# Patient Record
Sex: FEMALE | Race: WHITE | NOT HISPANIC OR LATINO | Employment: OTHER | URBAN - METROPOLITAN AREA
[De-identification: names, ages, dates, MRNs, and addresses within clinical notes are randomized per-mention and may not be internally consistent; named-entity substitution may affect disease eponyms.]

---

## 2023-02-25 PROBLEM — G62.9 NEUROPATHY: Status: ACTIVE | Noted: 2023-02-25

## 2023-02-25 PROBLEM — I99.8 LOWER LIMB ISCHEMIA: Status: ACTIVE | Noted: 2023-02-25

## 2023-02-25 PROBLEM — R00.0 SINUS TACHYCARDIA: Status: ACTIVE | Noted: 2023-02-25

## 2023-02-25 PROBLEM — F41.8 ANXIETY WITH DEPRESSION: Status: ACTIVE | Noted: 2023-02-25

## 2023-02-25 PROBLEM — I74.3: Status: ACTIVE | Noted: 2023-02-25

## 2023-02-25 PROBLEM — D52.9 ANEMIA, FOLATE DEFICIENCY: Status: ACTIVE | Noted: 2023-02-25

## 2023-02-25 PROBLEM — M19.011 PRIMARY OSTEOARTHRITIS OF BOTH SHOULDERS: Status: ACTIVE | Noted: 2023-02-25

## 2023-02-25 PROBLEM — S43.432A SUPERIOR GLENOID LABRUM LESION OF LEFT SHOULDER: Status: ACTIVE | Noted: 2023-02-25

## 2023-02-25 PROBLEM — F51.02 ADJUSTMENT INSOMNIA: Status: ACTIVE | Noted: 2023-02-25

## 2023-02-25 PROBLEM — R29.818 NEUROGENIC CLAUDICATION: Status: ACTIVE | Noted: 2023-02-25

## 2023-02-25 PROBLEM — M75.100 ROTATOR CUFF TEAR: Status: ACTIVE | Noted: 2023-02-25

## 2023-02-25 PROBLEM — Z86.718 HISTORY OF DVT (DEEP VEIN THROMBOSIS): Status: ACTIVE | Noted: 2023-02-25

## 2023-02-25 PROBLEM — N93.9 ABNORMAL UTERINE BLEEDING (AUB): Status: ACTIVE | Noted: 2023-02-25

## 2023-02-25 PROBLEM — F11.21 OPIOID DEPENDENCE IN REMISSION (MULTI): Status: ACTIVE | Noted: 2023-02-25

## 2023-02-25 PROBLEM — R73.9 HYPERGLYCEMIA: Status: ACTIVE | Noted: 2023-02-25

## 2023-02-25 PROBLEM — G89.4 CHRONIC PAIN SYNDROME: Status: ACTIVE | Noted: 2023-02-25

## 2023-02-25 PROBLEM — G45.9 TRANSIENT ISCHEMIC ATTACK: Status: ACTIVE | Noted: 2023-02-25

## 2023-02-25 PROBLEM — D64.9 ANEMIA, NORMOCYTIC NORMOCHROMIC: Status: ACTIVE | Noted: 2023-02-25

## 2023-02-25 PROBLEM — S33.5XXA LUMBAR SPRAIN: Status: ACTIVE | Noted: 2023-02-25

## 2023-02-25 PROBLEM — I10 ESSENTIAL HYPERTENSION: Status: ACTIVE | Noted: 2023-02-25

## 2023-02-25 PROBLEM — E78.2 MIXED HYPERLIPIDEMIA: Status: ACTIVE | Noted: 2023-02-25

## 2023-02-25 PROBLEM — M19.012 PRIMARY OSTEOARTHRITIS OF BOTH SHOULDERS: Status: ACTIVE | Noted: 2023-02-25

## 2023-02-25 PROBLEM — J12.81 PNEUMONIA DUE TO SARS-ASSOCIATED CORONAVIRUS: Status: ACTIVE | Noted: 2023-02-25

## 2023-02-25 PROBLEM — R53.81 DEBILITY: Status: ACTIVE | Noted: 2023-02-25

## 2023-02-25 PROBLEM — M54.16 LUMBAR RADICULAR PAIN: Status: ACTIVE | Noted: 2023-02-25

## 2023-02-25 PROBLEM — M25.512 LEFT SHOULDER PAIN: Status: ACTIVE | Noted: 2023-02-25

## 2023-02-25 PROBLEM — N39.0 UTI (URINARY TRACT INFECTION): Status: ACTIVE | Noted: 2023-02-25

## 2023-02-25 PROBLEM — M43.10 DEGENERATIVE SPONDYLOLISTHESIS: Status: ACTIVE | Noted: 2023-02-25

## 2023-02-25 PROBLEM — S39.012A LUMBAR STRAIN: Status: ACTIVE | Noted: 2023-02-25

## 2023-02-25 PROBLEM — D68.59 HYPERCOAGULABLE STATE (MULTI): Status: ACTIVE | Noted: 2023-02-25

## 2023-02-25 PROBLEM — M54.31 SCIATICA OF RIGHT SIDE: Status: ACTIVE | Noted: 2023-02-25

## 2023-02-25 PROBLEM — E55.9 VITAMIN D DEFICIENCY: Status: ACTIVE | Noted: 2023-02-25

## 2023-02-25 PROBLEM — M54.50 LOW BACK PAIN: Status: ACTIVE | Noted: 2023-02-25

## 2023-02-25 PROBLEM — U09.9 POST COVID-19 CONDITION, UNSPECIFIED: Status: ACTIVE | Noted: 2023-02-25

## 2023-02-25 PROBLEM — E78.00 PURE HYPERCHOLESTEROLEMIA WITH TARGET LOW DENSITY LIPOPROTEIN (LDL) CHOLESTEROL LESS THAN 130 MG/DL: Status: ACTIVE | Noted: 2023-02-25

## 2023-02-25 RX ORDER — ASPIRIN 325 MG
TABLET, DELAYED RELEASE (ENTERIC COATED) ORAL
COMMUNITY
Start: 2022-07-15 | End: 2023-09-06 | Stop reason: SDUPTHER

## 2023-02-25 RX ORDER — PNV NO.95/FERROUS FUM/FOLIC AC 28MG-0.8MG
TABLET ORAL
COMMUNITY
End: 2024-01-31 | Stop reason: SDUPTHER

## 2023-02-25 RX ORDER — BUPROPION HYDROCHLORIDE 300 MG/1
1 TABLET ORAL DAILY
COMMUNITY
End: 2023-08-30 | Stop reason: SDUPTHER

## 2023-02-25 RX ORDER — ACETAMINOPHEN 500 MG
TABLET ORAL
COMMUNITY

## 2023-02-25 RX ORDER — FOLIC ACID 1 MG/1
TABLET ORAL
COMMUNITY
End: 2024-01-31 | Stop reason: SDUPTHER

## 2023-02-25 RX ORDER — OXYCODONE AND ACETAMINOPHEN 10; 325 MG/1; MG/1
TABLET ORAL
COMMUNITY
End: 2023-09-06 | Stop reason: ALTCHOICE

## 2023-02-25 RX ORDER — NAPROXEN SODIUM 220 MG/1
TABLET, FILM COATED ORAL
COMMUNITY

## 2023-02-25 RX ORDER — WARFARIN 2.5 MG/1
TABLET ORAL
COMMUNITY
End: 2023-04-11

## 2023-02-25 RX ORDER — DILTIAZEM HYDROCHLORIDE 120 MG/1
CAPSULE, EXTENDED RELEASE ORAL
COMMUNITY
End: 2023-03-30 | Stop reason: SDUPTHER

## 2023-02-25 RX ORDER — PERPHENAZINE 16 MG
TABLET ORAL
COMMUNITY

## 2023-02-25 RX ORDER — ATORVASTATIN CALCIUM 80 MG/1
1 TABLET, FILM COATED ORAL NIGHTLY
COMMUNITY
End: 2023-03-30 | Stop reason: SDUPTHER

## 2023-02-25 RX ORDER — MULTIVITAMIN
1 TABLET ORAL DAILY
COMMUNITY
End: 2024-02-20

## 2023-03-09 LAB
INR IN PPP BY COAGULATION ASSAY: 2 (ref 0.9–1.1)
PROTHROMBIN TIME (PT) IN PPP BY COAGULATION ASSAY: 22.9 SEC (ref 9.8–13.4)

## 2023-03-14 ENCOUNTER — OFFICE VISIT (OUTPATIENT)
Dept: PRIMARY CARE | Facility: CLINIC | Age: 63
End: 2023-03-14
Payer: COMMERCIAL

## 2023-03-14 VITALS
SYSTOLIC BLOOD PRESSURE: 126 MMHG | DIASTOLIC BLOOD PRESSURE: 81 MMHG | WEIGHT: 191 LBS | BODY MASS INDEX: 32.61 KG/M2 | RESPIRATION RATE: 20 BRPM | HEART RATE: 80 BPM | HEIGHT: 64 IN | OXYGEN SATURATION: 99 %

## 2023-03-14 DIAGNOSIS — E78.00 PURE HYPERCHOLESTEROLEMIA WITH TARGET LOW DENSITY LIPOPROTEIN (LDL) CHOLESTEROL LESS THAN 130 MG/DL: ICD-10-CM

## 2023-03-14 DIAGNOSIS — I74.3 ARTERIAL EMBOLISM AND THROMBOSIS OF LOWER EXTREMITY (MULTI): ICD-10-CM

## 2023-03-14 DIAGNOSIS — I10 ESSENTIAL HYPERTENSION: Primary | ICD-10-CM

## 2023-03-14 DIAGNOSIS — F41.8 ANXIETY WITH DEPRESSION: ICD-10-CM

## 2023-03-14 DIAGNOSIS — E55.9 VITAMIN D DEFICIENCY: ICD-10-CM

## 2023-03-14 DIAGNOSIS — D52.9 ANEMIA DUE TO FOLIC ACID DEFICIENCY, UNSPECIFIED DEFICIENCY TYPE: ICD-10-CM

## 2023-03-14 DIAGNOSIS — R73.9 HYPERGLYCEMIA: ICD-10-CM

## 2023-03-14 PROCEDURE — 3008F BODY MASS INDEX DOCD: CPT | Performed by: INTERNAL MEDICINE

## 2023-03-14 PROCEDURE — 99213 OFFICE O/P EST LOW 20 MIN: CPT | Performed by: INTERNAL MEDICINE

## 2023-03-14 PROCEDURE — 3079F DIAST BP 80-89 MM HG: CPT | Performed by: INTERNAL MEDICINE

## 2023-03-14 PROCEDURE — 3074F SYST BP LT 130 MM HG: CPT | Performed by: INTERNAL MEDICINE

## 2023-03-14 NOTE — PROGRESS NOTES
"Subjective   Patient ID: Angle Martins is a 62 y.o. female who presents for Follow-up.    HPI   Seemingly doing much better.  Compliant with medications, tolerating regimens.  Denies dyspepsia.  No gum or nose bleeding.  No easy bruisability.  Likewise, no palpitations, dizziness, diaphoresis, or any other symptoms of hypovolemia.  No dyan substernal chest pain.  No orthopnea, no paroxysmal nocturnal dyspnea.    Likewise, seemingly feeling more \"half-full,\" and continues to want to stay healthy, independent, and productive.  Does not wish harm to self or others.  No headache, blurred vision, diplopia.  No dysphagia.  Still, she does point out that sometimes she does get a little emotional perhaps.  Again, still, does not wish harm to self or others, and continues to want to improve quality of life.    Careful about exposure.  No coughing, no sputum production.  CONSTITUTIONALLY, no fever, no chills.  No night sweats.  No lingering anorexia or nausea.  No apparent lymphadenopathy.  No apparent weight loss.        Review of Systems  Review of systems as in history of present illness, and otherwise, reviewed separately as well, and was unremarkable/negative/noncontributory.        Objective   /81 (BP Location: Right arm, Patient Position: Sitting, BP Cuff Size: Adult)   Pulse 80   Resp 20   Ht 1.626 m (5' 4\")   Wt 86.6 kg (191 lb)   SpO2 99%   BMI 32.79 kg/m²     Physical Exam  In very good spirits.  Not in distress or diaphoresis.  Alert, oriented x3.  Amiable.  Not unkempt.  Moderately built.  Receptive.  Cheerful.  Appropriate.  Eager to stay healthy and independent and productive.  Does not wish harm to self or others.    HEAD pink palpebral conjunctivae, anicteric sclerae.  NECK supple, no apparent jugular venous distention.  CARDIOVASCULAR not in distress or diaphoresis.  No bipedal edema.  Regular rate and rhythm.  No murmurs.  LUNGS not in distress or diaphoresis.  Not using accessory muscles.  " Clear to auscultation bilaterally.  ABDOMEN soft, nontender.  BACK no costovertebral angle tenderness.  EXTREMITIES no clubbing, no cyanosis.  NEURO no facial asymmetry.  No apparent cranial nerve deficits.  Ambulating with single point walking stick.  Favoring right lower extremity.  No tremors.  PSYCH receptive, appropriate, and eager to maintain and improve quality of life.      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)    Relevant Orders    Uric Acid    Referral to Hematology    Follow Up In Primary Care       Endocrine/Metabolic    Vitamin D deficiency    Relevant Orders    Vitamin D 25-Hydroxy,Total    Follow Up In Primary Care    Anemia, folate deficiency    Relevant Orders    Vitamin B12    Folate    Ferritin    Iron and TIBC    Urinalysis with Reflex Microscopic and Culture    Follow Up In Primary Care       Other    Anxiety with depression    Relevant Orders    Referral to Psychiatry    Follow Up In Primary Care    Hyperglycemia    Relevant Orders    Hemoglobin A1C    Comprehensive Metabolic Panel    Follow Up In Primary Care    Pure hypercholesterolemia with target low density lipoprotein (LDL) cholesterol less than 130 mg/dL    Relevant Orders    Lipid Panel    Comprehensive Metabolic Panel    Follow Up In Primary Care        Patient is here for routine visit.

## 2023-03-14 NOTE — PATIENT INSTRUCTIONS
Thank you very much for coming.  I am very happy to see you.  You look great!    Thank you for taking care of yourself and each other.  I am glad to hear that you are thriving.    I do understand that you are doing very well, but indeed, I do agree that you will benefit from seeing PSYCHIATRY if they are available.  Dr. Mckenna or Dr. Carrera will be very instrumental.  Until then, you are seemingly doing very well with your current regimen.  Please call me of course if anything changes.    Also, you will benefit from seeing HEMATOLOGY, but again, in the meantime, you are seemingly tolerating your WARFARIN.  Still, it would be a good idea to get a second opinion.    Please come back in 2 months, May, for repeat FASTING laboratory examination, then see me for your COMPLETE physical examination for the year.  Again, until then, please do not hesitate to call with questions or concerns.    Please continue to take care of yourself and each other, and please continue to pray for our recovery from this pandemic.  I hope you have a blessed Lent and a Happy Easter!            0  Return in 2 months.  40 minutes please.  Repeat FASTING laboratory examination, then see me for COMPLETE physical examination for the year.  Coordinate with cardiology, vascular medicine/surgery, possibly psychiatry, possibly hematology.  Reassess mood, energy, function, cardiovascular risk, preventive strategies, possibly weight management.            0

## 2023-03-20 LAB
AMPHETAMINES,URINE: <50 NG/ML
MDA,URINE: <200 NG/ML
MDEA,URINE: <200 NG/ML
MDMA,UR: <200 NG/ML
METHAMPHETAMINE QUANTITATIVE URINE: <200 NG/ML
PHENTERMINE,UR: <200 NG/ML

## 2023-03-21 LAB
6-ACETYLMORPHINE: <25 NG/ML
7-AMINOCLONAZEPAM: <25 NG/ML
ALPHA-HYDROXYALPRAZOLAM: <25 NG/ML
ALPHA-HYDROXYMIDAZOLAM: <25 NG/ML
ALPRAZOLAM: <25 NG/ML
AMPHETAMINE (PRESENCE) IN URINE BY SCREEN METHOD: ABNORMAL
BARBITURATES PRESENCE IN URINE BY SCREEN METHOD: ABNORMAL
CANNABINOIDS IN URINE BY SCREEN METHOD: ABNORMAL
CHLORDIAZEPOXIDE: <25 NG/ML
CLONAZEPAM: <25 NG/ML
COCAINE (PRESENCE) IN URINE BY SCREEN METHOD: ABNORMAL
CODEINE: <50 NG/ML
CREATINE, URINE FOR DRUG: 74.6 MG/DL
DIAZEPAM: <25 NG/ML
DRUG SCREEN COMMENT URINE: ABNORMAL
EDDP: <25 NG/ML
FENTANYL CONFIRMATION, URINE: <2.5 NG/ML
HYDROCODONE: <25 NG/ML
HYDROMORPHONE: <25 NG/ML
LORAZEPAM: <25 NG/ML
METHADONE CONFIRMATION,URINE: <25 NG/ML
MIDAZOLAM: <25 NG/ML
MORPHINE URINE: <50 NG/ML
NORDIAZEPAM: <25 NG/ML
NORFENTANYL: <2.5 NG/ML
NORHYDROCODONE: <25 NG/ML
NOROXYCODONE: >1000 NG/ML
O-DESMETHYLTRAMADOL: <50 NG/ML
OXAZEPAM: <25 NG/ML
OXYCODONE: 1730 NG/ML
OXYMORPHONE: 561 NG/ML
PHENCYCLIDINE (PRESENCE) IN URINE BY SCREEN METHOD: ABNORMAL
TEMAZEPAM: <25 NG/ML
TRAMADOL: <50 NG/ML
ZOLPIDEM METABOLITE (ZCA): <25 NG/ML
ZOLPIDEM: <25 NG/ML

## 2023-03-30 DIAGNOSIS — I10 ESSENTIAL HYPERTENSION: Primary | ICD-10-CM

## 2023-03-30 DIAGNOSIS — E78.00 PURE HYPERCHOLESTEROLEMIA WITH TARGET LOW DENSITY LIPOPROTEIN (LDL) CHOLESTEROL LESS THAN 130 MG/DL: ICD-10-CM

## 2023-03-31 RX ORDER — ATORVASTATIN CALCIUM 80 MG/1
80 TABLET, FILM COATED ORAL NIGHTLY
Qty: 90 TABLET | Refills: 1 | Status: SHIPPED | OUTPATIENT
Start: 2023-03-31 | End: 2023-06-29 | Stop reason: SDUPTHER

## 2023-03-31 RX ORDER — DILTIAZEM HYDROCHLORIDE 120 MG/1
120 CAPSULE, EXTENDED RELEASE ORAL DAILY
Qty: 90 CAPSULE | Refills: 1 | Status: SHIPPED | OUTPATIENT
Start: 2023-03-31 | End: 2023-08-30 | Stop reason: SDUPTHER

## 2023-04-03 ENCOUNTER — TELEPHONE (OUTPATIENT)
Dept: PRIMARY CARE | Facility: CLINIC | Age: 63
End: 2023-04-03
Payer: COMMERCIAL

## 2023-04-05 DIAGNOSIS — D68.59 HYPERCOAGULABLE STATE (MULTI): ICD-10-CM

## 2023-04-05 DIAGNOSIS — I74.3 ARTERIAL EMBOLISM AND THROMBOSIS OF LOWER EXTREMITY (MULTI): Primary | ICD-10-CM

## 2023-04-05 DIAGNOSIS — Z86.718 HISTORY OF DVT (DEEP VEIN THROMBOSIS): ICD-10-CM

## 2023-04-05 LAB
INR IN PPP BY COAGULATION ASSAY: 1.7 (ref 0.9–1.1)
PROTHROMBIN TIME (PT) IN PPP BY COAGULATION ASSAY: 19.3 SEC (ref 9.8–13.4)

## 2023-04-11 ENCOUNTER — ANTICOAGULATION - OTHER VISIT (DOAC) (OUTPATIENT)
Dept: PRIMARY CARE | Facility: CLINIC | Age: 63
End: 2023-04-11
Payer: COMMERCIAL

## 2023-04-11 DIAGNOSIS — Z86.718 HISTORY OF DVT (DEEP VEIN THROMBOSIS): ICD-10-CM

## 2023-04-11 DIAGNOSIS — I74.3 ARTERIAL EMBOLISM AND THROMBOSIS OF LOWER EXTREMITY (MULTI): Primary | ICD-10-CM

## 2023-04-11 RX ORDER — WARFARIN 3 MG/1
TABLET ORAL
Qty: 90 TABLET | Refills: 0 | Status: SHIPPED | OUTPATIENT
Start: 2023-04-11 | End: 2023-06-27 | Stop reason: SDUPTHER

## 2023-04-11 NOTE — PROGRESS NOTES
PT/INR 1.7, with patient called to make adjustments.    Taking 2.5 mg 5 days a week, 3.75 mg 2 other days.    Instructed patient to take 2 tablets 2.5 mg now, then proceed to take 3 mg every day until seen by WARFARIN CLINIC.    Referral placed.  Patient already in touch with the warfarin clinic in [place].    Patient urged to call with further questions or concerns.  She also has my personal number.

## 2023-04-12 DIAGNOSIS — D52.9 ANEMIA DUE TO FOLIC ACID DEFICIENCY, UNSPECIFIED DEFICIENCY TYPE: ICD-10-CM

## 2023-04-12 DIAGNOSIS — Z86.718 HISTORY OF DVT (DEEP VEIN THROMBOSIS): ICD-10-CM

## 2023-04-12 DIAGNOSIS — N93.9 ABNORMAL UTERINE BLEEDING (AUB): Primary | ICD-10-CM

## 2023-04-12 DIAGNOSIS — I74.3 ARTERIAL EMBOLISM AND THROMBOSIS OF LOWER EXTREMITY (MULTI): ICD-10-CM

## 2023-05-03 ENCOUNTER — TELEPHONE (OUTPATIENT)
Dept: PRIMARY CARE | Facility: CLINIC | Age: 63
End: 2023-05-03
Payer: COMMERCIAL

## 2023-05-03 DIAGNOSIS — M54.16 LUMBAR RADICULAR PAIN: Primary | ICD-10-CM

## 2023-05-03 DIAGNOSIS — G62.9 NEUROPATHY: ICD-10-CM

## 2023-05-03 DIAGNOSIS — R29.818 NEUROGENIC CLAUDICATION: ICD-10-CM

## 2023-05-03 DIAGNOSIS — M43.10 DEGENERATIVE SPONDYLOLISTHESIS: ICD-10-CM

## 2023-05-03 NOTE — TELEPHONE ENCOUNTER
Needs new EMG order, pt is scheduled for tomorrow. Says they would hate to have to reschedule pt.  Please place order in system.  When done we will fax.    [phone]  fx [phone]

## 2023-05-16 ENCOUNTER — APPOINTMENT (OUTPATIENT)
Dept: PRIMARY CARE | Facility: CLINIC | Age: 63
End: 2023-05-16
Payer: COMMERCIAL

## 2023-05-31 ENCOUNTER — APPOINTMENT (OUTPATIENT)
Dept: PRIMARY CARE | Facility: CLINIC | Age: 63
End: 2023-05-31
Payer: COMMERCIAL

## 2023-06-12 ENCOUNTER — OFFICE VISIT (OUTPATIENT)
Dept: PRIMARY CARE | Facility: CLINIC | Age: 63
End: 2023-06-12
Payer: COMMERCIAL

## 2023-06-12 VITALS
SYSTOLIC BLOOD PRESSURE: 120 MMHG | DIASTOLIC BLOOD PRESSURE: 85 MMHG | RESPIRATION RATE: 20 BRPM | HEART RATE: 73 BPM | BODY MASS INDEX: 33.05 KG/M2 | WEIGHT: 193.6 LBS | HEIGHT: 64 IN | OXYGEN SATURATION: 96 %

## 2023-06-12 DIAGNOSIS — I74.3 ARTERIAL EMBOLISM AND THROMBOSIS OF LOWER EXTREMITY (MULTI): ICD-10-CM

## 2023-06-12 DIAGNOSIS — E78.00 PURE HYPERCHOLESTEROLEMIA WITH TARGET LOW DENSITY LIPOPROTEIN (LDL) CHOLESTEROL LESS THAN 130 MG/DL: ICD-10-CM

## 2023-06-12 DIAGNOSIS — E55.9 VITAMIN D DEFICIENCY: ICD-10-CM

## 2023-06-12 DIAGNOSIS — I10 ESSENTIAL HYPERTENSION: Primary | ICD-10-CM

## 2023-06-12 DIAGNOSIS — N93.9 ABNORMAL UTERINE BLEEDING (AUB): ICD-10-CM

## 2023-06-12 DIAGNOSIS — E66.09 CLASS 1 OBESITY DUE TO EXCESS CALORIES WITH SERIOUS COMORBIDITY AND BODY MASS INDEX (BMI) OF 33.0 TO 33.9 IN ADULT: ICD-10-CM

## 2023-06-12 DIAGNOSIS — R73.9 HYPERGLYCEMIA: ICD-10-CM

## 2023-06-12 DIAGNOSIS — F41.8 ANXIETY WITH DEPRESSION: ICD-10-CM

## 2023-06-12 DIAGNOSIS — Z12.31 SCREENING MAMMOGRAM, ENCOUNTER FOR: ICD-10-CM

## 2023-06-12 DIAGNOSIS — Z13.820 SCREENING FOR OSTEOPOROSIS: ICD-10-CM

## 2023-06-12 DIAGNOSIS — R26.0 ATAXIC GAIT: ICD-10-CM

## 2023-06-12 DIAGNOSIS — Z11.59 ENCOUNTER FOR HEPATITIS C SCREENING TEST FOR LOW RISK PATIENT: ICD-10-CM

## 2023-06-12 DIAGNOSIS — R29.818 ROMBERG'S TEST POSITIVE: ICD-10-CM

## 2023-06-12 DIAGNOSIS — D52.9 ANEMIA DUE TO FOLIC ACID DEFICIENCY, UNSPECIFIED DEFICIENCY TYPE: ICD-10-CM

## 2023-06-12 DIAGNOSIS — M85.89 OSTEOPENIA OF MULTIPLE SITES: ICD-10-CM

## 2023-06-12 PROCEDURE — 1036F TOBACCO NON-USER: CPT | Performed by: INTERNAL MEDICINE

## 2023-06-12 PROCEDURE — 99215 OFFICE O/P EST HI 40 MIN: CPT | Performed by: INTERNAL MEDICINE

## 2023-06-12 PROCEDURE — 3079F DIAST BP 80-89 MM HG: CPT | Performed by: INTERNAL MEDICINE

## 2023-06-12 PROCEDURE — 3074F SYST BP LT 130 MM HG: CPT | Performed by: INTERNAL MEDICINE

## 2023-06-12 PROCEDURE — 3008F BODY MASS INDEX DOCD: CPT | Performed by: INTERNAL MEDICINE

## 2023-06-12 RX ORDER — GABAPENTIN 300 MG/1
CAPSULE ORAL
COMMUNITY
Start: 2022-06-14 | End: 2023-09-06 | Stop reason: ALTCHOICE

## 2023-06-12 RX ORDER — ACETAMINOPHEN 325 MG/1
TABLET ORAL EVERY 6 HOURS PRN
COMMUNITY
Start: 2022-12-22

## 2023-06-12 NOTE — PATIENT INSTRUCTIONS
Thank you very much for coming.  I am very happy to see you.    I am so happy to hear that you continue to have improvement of your overall health and quality of life.  Please continue following with your VASCULAR specialist, as well as your CARDIOLOGIST.    It is very important and very helpful for you to have FASTING laboratory examinations done soon.  They are on file for you at [facility] to retrieve, ordered in March.  Also, tell them that I have also ordered a hepatitis C screening test.  Please do not forget to drink lots of water, or black coffee, black tea, but no sugar, no cream, and no milk.    I am glad to hear that you have had some improvement as far as pain control is concerned, and that you are able to move better.  Still, I think that it is also a good idea for you to apply for DISABILITY.  I will try my best to fill out the paperwork if it comes across my desk.  You do need help and the disability services are for patients like you.    Thank you for seeing GYNECOLOGY.  You are due for your MAMMOGRAM soon.    Likewise, you are due for a BONE DENSITY test.    Again, thank you very much for coming.  I would like to see you again in 2 months.  Until then, I do understand that you already started OZEMPIC, and you have not had any trouble.  Let see what your blood examinations show.  If you run out of Ozempic before your insurance allows us to refill it, please let me know, and I can obtain some samples for you.    Please go ahead and get yourself vaccinated for SHINGLES.  SHINGRIX is recommended by your insurance, and should be paid for by your insurance.  Call your favorite pharmacy to schedule!    Please continue to take care of yourself and each other, and please continue to pray for our recovery from this pandemic.  FASTING laboratory examinations soon via [facility].  Let me call you with results and possible changes.    See you in 2 months.  Until then, belated happy Mother's Day.   Advanced happy Father's Day to your .  I hope you have a good summer.  Call please as needed.            0  Return in 2 months.  20 minutes please.  Reassess debility.  Coordinate with VASCULAR surgery, cardiology, hematology, neurology, chronic pain, physical therapy.  Consider referral to psychiatry if appropriate.  Review preventive strategies, cardiovascular risk.            0

## 2023-06-12 NOTE — PROGRESS NOTES
Subjective   Patient ID: Angle Martins is a 63 y.o. female who presents for Annual Exam.    HPI   The patient is here for her COMPLETE physical examination for the year.  She has no particular complaints.    Compliant with medications, tolerating regimens.  Working with VASCULAR surgery, CHRONIC pain, CARDIOLOGY, and in the meantime, managing with the use of a single-point walking stick, thanks to PHYSICAL THERAPY.  Remaining motivated to continue to improve quality of life.  No headache, blurred vision, diplopia.  No dysphagia.  No new focal weakness.  No recent falls.  Not worried about memory.  Not wishing harm to self or others.    Careful about exposure.  No coughing, no sputum production.  CONSTITUTIONALLY, no fever, no chills.  No night sweats.  No lingering anorexia or nausea.  No apparent lymphadenopathy.  No apparent weight loss.  Appetite preserved, with no nausea, vomiting, abdominal distress.  No diarrhea, no constipation.  No apparent blood in stool.  No apparent weight loss.  No dysuria, flank, suprapubic pain.  No discharge, no pruritus.  No rash.  No malodor.  No hesitancy, no feeling of incomplete voiding.  No skin changes, rashes, pruritus, jaundice.  No easy bruisability.  ENDOCRINE with no polyuria, polydipsia, polyphagia.  No blurred vision.  No skin, hair, nail changes.  No dramatic weight loss or weight gain.      Following closely with CHRONIC PAIN, with good response to infusion of KETAMINE.  Again, patient very happy about response, with improved exercise tolerance, improved mood, and in the meantime, no episodes of confusion or delirium.    Seen by GYNECOLOGY for abnormal uterine bleeding in September of last year, and was subjected to internal examination, and was told that everything was okay.  In the interim, tolerating medications, some bruising, but otherwise, no gum or nose bleeding.  No apparent blood in urine or stool.  Likewise, no recurrence of vaginal spotting.    The patient  "later pointed out that she did restart her OZEMPIC.  She was successful in losing about 20 pounds over the past 2 years, and was wondering if she could restart taking SEMAGLUTIDE again.  She already started with 0.25 mg once a week.  Again, no GI symptoms at this time.    Likewise, later, the patient pointed out that she does get teary-eyed and perhaps a little sentimental when she thinks about how sick she has been, but then she realizes that she could have lost her right leg, that she has been very fortunate to have such advanced care available to her, and she states that she does not stay depressive for too long.  Again, continues to want to improve quality of life, and does not wish harm to self or others.        Review of Systems  Review of systems as in history of present illness, and otherwise, reviewed separately as well, and was unremarkable/negative/noncontributory.          Objective   /85 (BP Location: Right arm, Patient Position: Sitting, BP Cuff Size: Adult)   Pulse 73   Resp 20   Ht 1.626 m (5' 4\")   Wt 87.8 kg (193 lb 9.6 oz)   SpO2 96%   BMI 33.23 kg/m²     Physical Exam  In very good spirits.  Not in distress or diaphoresis.  Alert, oriented x3.  Amiable.  Not unkempt.  Receptive.  Cheerful.  Appropriate.  Eager to continue to improve quality of life.  Does not wish harm to self or others.    HEAD pink palpebral conjunctivae, anicteric sclerae.  Mucous membranes moist.  No tonsillopharyngeal congestion, no thrush.  Tympanic membranes intact bilaterally.  NECK supple, no apparent jugular venous distention.  No apparent lymphadenopathy.  No carotid bruit.  CARDIOVASCULAR not in distress or diaphoresis.  No bipedal edema.  Regular rate and rhythm.  No heaves, thrills, or murmurs appreciated.  LUNGS not in distress or diaphoresis.  Not using accessory muscles.  Clear to auscultation bilaterally.  ABDOMEN soft, nontender.  Liver and spleen not palpable.  BACK no costovertebral angle " tenderness.  EXTREMITIES no clubbing, no cyanosis.  Currently, pulses +2 throughout.  No calf tenderness bilaterally.  Onychomycosis noted left great toenail, absent right great toenail.  SCARS noted RLE.  Intact skin.  SKIN with no breakdown, bleeding, jaundice.  NEURO no facial asymmetry.  No apparent cranial nerve deficits.  Romberg positive.  Ambulating with use of single-point walking stick.  No apparent focal weakness.  No tremors.  PSYCH receptive, appropriate, and eager to maintain and improve quality of life.          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        Relevant Orders    XR DEXA bone density    Class 1 obesity due to excess calories with serious comorbidity and body mass index (BMI) of 33.0 to 33.9 in adult        Encounter for hepatitis C screening test for low risk patient        Relevant Orders    Hepatitis C antibody    Screening mammogram, encounter for        Relevant Orders    BI mammo bilateral screening tomosynthesis    Ataxic gait        Relevant Orders    XR DEXA bone density    Osteopenia of multiple sites        Relevant Orders    XR DEXA bone density    Screening for osteoporosis        Relevant Orders    XR DEXA bone density             Thank you very much for coming.  I am very happy to see you.    I am so happy to hear that you continue to have improvement of your overall health and quality of life.  Please continue following with your VASCULAR specialist, as well as your CARDIOLOGIST.    It is very important and very helpful for  you to have FASTING laboratory examinations done soon.  They are on file for you at [facility] to retrieve, ordered in March.  Also, tell them that I have also ordered a hepatitis C screening test.  Please do not forget to drink lots of water, or black coffee, black tea, but no sugar, no cream, and no milk.    I am glad to hear that you have had some improvement as far as pain control is concerned, and that you are able to move better.  Still, I think that it is also a good idea for you to apply for DISABILITY.  I will try my best to fill out the paperwork if it comes across my desk.  You do need help and the disability services are for patients like you.    Thank you for seeing GYNECOLOGY.  You are due for your MAMMOGRAM soon.    Likewise, you are due for a BONE DENSITY test.    Again, thank you very much for coming.  I would like to see you again in 2 months.  Until then, I do understand that you already started OZEMPIC, and you have not had any trouble.  Let see what your blood examinations show.  If you run out of Ozempic before your insurance allows us to refill it, please let me know, and I can obtain some samples for you.    Please go ahead and get yourself vaccinated for SHINGLES.  SHINGRIX is recommended by your insurance, and should be paid for by your insurance.  Call your favorite pharmacy to schedule!    Please continue to take care of yourself and each other, and please continue to pray for our recovery from this pandemic.  FASTING laboratory examinations soon via [facility].  Let me call you with results and possible changes.    See you in 2 months.  Until then, belated happy Mother's Day.  Advanced happy Father's Day to your .  I hope you have a good summer.  Call please as needed.            0  Return in 2 months.  20 minutes please.  Reassess debility.  Coordinate with VASCULAR surgery, cardiology, hematology, neurology, chronic pain, physical therapy.  Consider referral to  psychiatry if appropriate.  Review preventive strategies, cardiovascular risk.            0

## 2023-06-22 ENCOUNTER — LAB (OUTPATIENT)
Dept: LAB | Facility: LAB | Age: 63
End: 2023-06-22
Payer: COMMERCIAL

## 2023-06-22 DIAGNOSIS — N93.9 ABNORMAL UTERINE BLEEDING (AUB): ICD-10-CM

## 2023-06-22 DIAGNOSIS — I74.3 ARTERIAL EMBOLISM AND THROMBOSIS OF LOWER EXTREMITY (MULTI): ICD-10-CM

## 2023-06-22 DIAGNOSIS — D52.9 ANEMIA DUE TO FOLIC ACID DEFICIENCY, UNSPECIFIED DEFICIENCY TYPE: ICD-10-CM

## 2023-06-22 DIAGNOSIS — Z86.718 HISTORY OF DVT (DEEP VEIN THROMBOSIS): ICD-10-CM

## 2023-06-22 DIAGNOSIS — R73.9 HYPERGLYCEMIA: ICD-10-CM

## 2023-06-22 DIAGNOSIS — Z11.59 ENCOUNTER FOR HEPATITIS C SCREENING TEST FOR LOW RISK PATIENT: ICD-10-CM

## 2023-06-22 DIAGNOSIS — E78.00 PURE HYPERCHOLESTEROLEMIA WITH TARGET LOW DENSITY LIPOPROTEIN (LDL) CHOLESTEROL LESS THAN 130 MG/DL: ICD-10-CM

## 2023-06-22 DIAGNOSIS — I10 ESSENTIAL HYPERTENSION: ICD-10-CM

## 2023-06-22 DIAGNOSIS — E55.9 VITAMIN D DEFICIENCY: ICD-10-CM

## 2023-06-22 LAB
ALANINE AMINOTRANSFERASE (SGPT) (U/L) IN SER/PLAS: 18 U/L (ref 7–45)
ALBUMIN (G/DL) IN SER/PLAS: 4.6 G/DL (ref 3.4–5)
ALKALINE PHOSPHATASE (U/L) IN SER/PLAS: 70 U/L (ref 33–136)
ANION GAP IN SER/PLAS: 13 MMOL/L (ref 10–20)
APPEARANCE, URINE: CLEAR
ASPARTATE AMINOTRANSFERASE (SGOT) (U/L) IN SER/PLAS: 16 U/L (ref 9–39)
BASOPHILS (10*3/UL) IN BLOOD BY AUTOMATED COUNT: 0.03 X10E9/L (ref 0–0.1)
BASOPHILS/100 LEUKOCYTES IN BLOOD BY AUTOMATED COUNT: 0.6 % (ref 0–2)
BILIRUBIN TOTAL (MG/DL) IN SER/PLAS: 0.4 MG/DL (ref 0–1.2)
BILIRUBIN, URINE: NEGATIVE
BLOOD, URINE: ABNORMAL
CALCIDIOL (25 OH VITAMIN D3) (NG/ML) IN SER/PLAS: 30 NG/ML
CALCIUM (MG/DL) IN SER/PLAS: 9.9 MG/DL (ref 8.6–10.3)
CARBON DIOXIDE, TOTAL (MMOL/L) IN SER/PLAS: 27 MMOL/L (ref 21–32)
CHLORIDE (MMOL/L) IN SER/PLAS: 105 MMOL/L (ref 98–107)
CHOLESTEROL (MG/DL) IN SER/PLAS: 167 MG/DL (ref 0–199)
CHOLESTEROL IN HDL (MG/DL) IN SER/PLAS: 59.7 MG/DL
CHOLESTEROL/HDL RATIO: 2.8
COBALAMIN (VITAMIN B12) (PG/ML) IN SER/PLAS: 305 PG/ML (ref 211–911)
COLOR, URINE: YELLOW
CREATINE KINASE (U/L) IN SER/PLAS: 48 U/L (ref 0–215)
CREATININE (MG/DL) IN SER/PLAS: 1.06 MG/DL (ref 0.5–1.05)
EOSINOPHILS (10*3/UL) IN BLOOD BY AUTOMATED COUNT: 0.13 X10E9/L (ref 0–0.7)
EOSINOPHILS/100 LEUKOCYTES IN BLOOD BY AUTOMATED COUNT: 2.4 % (ref 0–6)
ERYTHROCYTE DISTRIBUTION WIDTH (RATIO) BY AUTOMATED COUNT: 13.1 % (ref 11.5–14.5)
ERYTHROCYTE MEAN CORPUSCULAR HEMOGLOBIN CONCENTRATION (G/DL) BY AUTOMATED: 33.6 G/DL (ref 32–36)
ERYTHROCYTE MEAN CORPUSCULAR VOLUME (FL) BY AUTOMATED COUNT: 95 FL (ref 80–100)
ERYTHROCYTES (10*6/UL) IN BLOOD BY AUTOMATED COUNT: 4.45 X10E12/L (ref 4–5.2)
FERRITIN (UG/LL) IN SER/PLAS: 127 UG/L (ref 8–150)
FOLATE (NG/ML) IN SER/PLAS: >22.3 NG/ML
GFR FEMALE: 60 ML/MIN/1.73M2
GLUCOSE (MG/DL) IN SER/PLAS: 90 MG/DL (ref 74–99)
GLUCOSE, URINE: NEGATIVE MG/DL
HEMATOCRIT (%) IN BLOOD BY AUTOMATED COUNT: 42.3 % (ref 36–46)
HEMOGLOBIN (G/DL) IN BLOOD: 14.2 G/DL (ref 12–16)
HEPATITIS C VIRUS AB PRESENCE IN SERUM: NONREACTIVE
IMMATURE GRANULOCYTES/100 LEUKOCYTES IN BLOOD BY AUTOMATED COUNT: 0.2 % (ref 0–0.9)
IRON (UG/DL) IN SER/PLAS: 86 UG/DL (ref 35–150)
IRON BINDING CAPACITY (UG/DL) IN SER/PLAS: 330 UG/DL (ref 240–445)
IRON SATURATION (%) IN SER/PLAS: 26 % (ref 25–45)
KETONES, URINE: ABNORMAL MG/DL
LDL: 88 MG/DL (ref 0–99)
LEUKOCYTE ESTERASE, URINE: NEGATIVE
LEUKOCYTES (10*3/UL) IN BLOOD BY AUTOMATED COUNT: 5.4 X10E9/L (ref 4.4–11.3)
LYMPHOCYTES (10*3/UL) IN BLOOD BY AUTOMATED COUNT: 1.8 X10E9/L (ref 1.2–4.8)
LYMPHOCYTES/100 LEUKOCYTES IN BLOOD BY AUTOMATED COUNT: 33.6 % (ref 13–44)
MAGNESIUM (MG/DL) IN SER/PLAS: 1.84 MG/DL (ref 1.6–2.4)
MONOCYTES (10*3/UL) IN BLOOD BY AUTOMATED COUNT: 0.44 X10E9/L (ref 0.1–1)
MONOCYTES/100 LEUKOCYTES IN BLOOD BY AUTOMATED COUNT: 8.2 % (ref 2–10)
MUCUS, URINE: NORMAL /LPF
NEUTROPHILS (10*3/UL) IN BLOOD BY AUTOMATED COUNT: 2.94 X10E9/L (ref 1.2–7.7)
NEUTROPHILS/100 LEUKOCYTES IN BLOOD BY AUTOMATED COUNT: 55 % (ref 40–80)
NITRITE, URINE: NEGATIVE
PH, URINE: 6 (ref 5–8)
PLATELETS (10*3/UL) IN BLOOD AUTOMATED COUNT: 212 X10E9/L (ref 150–450)
POTASSIUM (MMOL/L) IN SER/PLAS: 4.2 MMOL/L (ref 3.5–5.3)
PROTEIN TOTAL: 6.9 G/DL (ref 6.4–8.2)
PROTEIN, URINE: ABNORMAL MG/DL
RBC, URINE: 2 /HPF (ref 0–5)
SODIUM (MMOL/L) IN SER/PLAS: 141 MMOL/L (ref 136–145)
SPECIFIC GRAVITY, URINE: 1.02 (ref 1–1.03)
SQUAMOUS EPITHELIAL CELLS, URINE: <1 /HPF
THYROTROPIN (MIU/L) IN SER/PLAS BY DETECTION LIMIT <= 0.05 MIU/L: 1.75 MIU/L (ref 0.44–3.98)
TRIGLYCERIDE (MG/DL) IN SER/PLAS: 97 MG/DL (ref 0–149)
URATE (MG/DL) IN SER/PLAS: 4.5 MG/DL (ref 2.3–6.7)
UREA NITROGEN (MG/DL) IN SER/PLAS: 17 MG/DL (ref 6–23)
UROBILINOGEN, URINE: <2 MG/DL (ref 0–1.9)
VLDL: 19 MG/DL (ref 0–40)
WBC, URINE: 1 /HPF (ref 0–5)

## 2023-06-22 PROCEDURE — 82550 ASSAY OF CK (CPK): CPT

## 2023-06-22 PROCEDURE — 82306 VITAMIN D 25 HYDROXY: CPT

## 2023-06-22 PROCEDURE — 84443 ASSAY THYROID STIM HORMONE: CPT

## 2023-06-22 PROCEDURE — 82728 ASSAY OF FERRITIN: CPT

## 2023-06-22 PROCEDURE — 85025 COMPLETE CBC W/AUTO DIFF WBC: CPT

## 2023-06-22 PROCEDURE — 80061 LIPID PANEL: CPT

## 2023-06-22 PROCEDURE — 36415 COLL VENOUS BLD VENIPUNCTURE: CPT

## 2023-06-22 PROCEDURE — 84550 ASSAY OF BLOOD/URIC ACID: CPT

## 2023-06-22 PROCEDURE — 82746 ASSAY OF FOLIC ACID SERUM: CPT

## 2023-06-22 PROCEDURE — 83550 IRON BINDING TEST: CPT

## 2023-06-22 PROCEDURE — 83036 HEMOGLOBIN GLYCOSYLATED A1C: CPT

## 2023-06-22 PROCEDURE — 83540 ASSAY OF IRON: CPT

## 2023-06-22 PROCEDURE — 82607 VITAMIN B-12: CPT

## 2023-06-22 PROCEDURE — 83735 ASSAY OF MAGNESIUM: CPT

## 2023-06-22 PROCEDURE — 86803 HEPATITIS C AB TEST: CPT

## 2023-06-22 PROCEDURE — 80053 COMPREHEN METABOLIC PANEL: CPT

## 2023-06-22 PROCEDURE — 81001 URINALYSIS AUTO W/SCOPE: CPT

## 2023-06-23 LAB
ESTIMATED AVERAGE GLUCOSE FOR HBA1C: 108 MG/DL
HEMOGLOBIN A1C/HEMOGLOBIN TOTAL IN BLOOD: 5.4 %

## 2023-06-27 DIAGNOSIS — R73.03 PREDIABETES: Primary | ICD-10-CM

## 2023-06-27 DIAGNOSIS — E66.09 CLASS 1 OBESITY DUE TO EXCESS CALORIES WITH SERIOUS COMORBIDITY AND BODY MASS INDEX (BMI) OF 33.0 TO 33.9 IN ADULT: ICD-10-CM

## 2023-06-27 DIAGNOSIS — Z86.718 HISTORY OF DVT (DEEP VEIN THROMBOSIS): ICD-10-CM

## 2023-06-27 DIAGNOSIS — I74.3 ARTERIAL EMBOLISM AND THROMBOSIS OF LOWER EXTREMITY (MULTI): ICD-10-CM

## 2023-06-27 RX ORDER — WARFARIN 3 MG/1
TABLET ORAL
Qty: 96 TABLET | Refills: 0 | Status: SHIPPED | OUTPATIENT
Start: 2023-06-27 | End: 2023-06-29 | Stop reason: SDUPTHER

## 2023-06-29 DIAGNOSIS — I74.3 ARTERIAL EMBOLISM AND THROMBOSIS OF LOWER EXTREMITY (MULTI): ICD-10-CM

## 2023-06-29 DIAGNOSIS — E78.00 PURE HYPERCHOLESTEROLEMIA WITH TARGET LOW DENSITY LIPOPROTEIN (LDL) CHOLESTEROL LESS THAN 130 MG/DL: ICD-10-CM

## 2023-06-29 DIAGNOSIS — Z86.718 HISTORY OF DVT (DEEP VEIN THROMBOSIS): ICD-10-CM

## 2023-06-29 RX ORDER — ATORVASTATIN CALCIUM 80 MG/1
80 TABLET, FILM COATED ORAL NIGHTLY
Qty: 90 TABLET | Refills: 1 | Status: SHIPPED | OUTPATIENT
Start: 2023-06-29 | End: 2024-02-05 | Stop reason: SDUPTHER

## 2023-06-29 RX ORDER — WARFARIN 3 MG/1
TABLET ORAL
Qty: 96 TABLET | Refills: 0 | Status: SHIPPED | OUTPATIENT
Start: 2023-06-29 | End: 2023-11-06 | Stop reason: SDUPTHER

## 2023-08-14 ENCOUNTER — APPOINTMENT (OUTPATIENT)
Dept: PRIMARY CARE | Facility: CLINIC | Age: 63
End: 2023-08-14
Payer: COMMERCIAL

## 2023-08-30 DIAGNOSIS — I10 ESSENTIAL HYPERTENSION: ICD-10-CM

## 2023-08-30 DIAGNOSIS — F41.8 ANXIETY WITH DEPRESSION: Primary | ICD-10-CM

## 2023-08-31 RX ORDER — DILTIAZEM HYDROCHLORIDE 120 MG/1
120 CAPSULE, EXTENDED RELEASE ORAL DAILY
Qty: 90 CAPSULE | Refills: 1 | Status: SHIPPED | OUTPATIENT
Start: 2023-08-31 | End: 2024-04-09 | Stop reason: SDUPTHER

## 2023-08-31 RX ORDER — BUPROPION HYDROCHLORIDE 300 MG/1
300 TABLET ORAL DAILY
Qty: 90 TABLET | Refills: 0 | Status: SHIPPED | OUTPATIENT
Start: 2023-08-31 | End: 2023-09-06 | Stop reason: ALTCHOICE

## 2023-09-06 ENCOUNTER — OFFICE VISIT (OUTPATIENT)
Dept: PRIMARY CARE | Facility: CLINIC | Age: 63
End: 2023-09-06
Payer: COMMERCIAL

## 2023-09-06 VITALS
HEIGHT: 64 IN | HEART RATE: 66 BPM | BODY MASS INDEX: 32.78 KG/M2 | SYSTOLIC BLOOD PRESSURE: 106 MMHG | DIASTOLIC BLOOD PRESSURE: 73 MMHG | WEIGHT: 192 LBS | OXYGEN SATURATION: 95 %

## 2023-09-06 DIAGNOSIS — E66.09 CLASS 1 OBESITY DUE TO EXCESS CALORIES WITH SERIOUS COMORBIDITY AND BODY MASS INDEX (BMI) OF 33.0 TO 33.9 IN ADULT: ICD-10-CM

## 2023-09-06 DIAGNOSIS — F33.1 MAJOR DEPRESSIVE DISORDER, RECURRENT EPISODE, MODERATE DEGREE (MULTI): ICD-10-CM

## 2023-09-06 DIAGNOSIS — R53.81 DEBILITY: Primary | ICD-10-CM

## 2023-09-06 DIAGNOSIS — E78.2 MIXED HYPERLIPIDEMIA: ICD-10-CM

## 2023-09-06 DIAGNOSIS — U07.1 COVID-19 VIRUS INFECTION: ICD-10-CM

## 2023-09-06 DIAGNOSIS — D52.9 ANEMIA DUE TO FOLIC ACID DEFICIENCY, UNSPECIFIED DEFICIENCY TYPE: ICD-10-CM

## 2023-09-06 DIAGNOSIS — F11.21 OPIOID DEPENDENCE IN REMISSION (MULTI): ICD-10-CM

## 2023-09-06 DIAGNOSIS — I74.3 ARTERIAL EMBOLISM AND THROMBOSIS OF LOWER EXTREMITY (MULTI): ICD-10-CM

## 2023-09-06 DIAGNOSIS — R26.0 ATAXIC GAIT: ICD-10-CM

## 2023-09-06 DIAGNOSIS — G89.4 CHRONIC PAIN SYNDROME: ICD-10-CM

## 2023-09-06 DIAGNOSIS — E55.9 VITAMIN D DEFICIENCY: ICD-10-CM

## 2023-09-06 DIAGNOSIS — I10 ESSENTIAL HYPERTENSION: ICD-10-CM

## 2023-09-06 DIAGNOSIS — R31.29 MICROSCOPIC HEMATURIA: ICD-10-CM

## 2023-09-06 DIAGNOSIS — M62.82 NON-TRAUMATIC RHABDOMYOLYSIS: ICD-10-CM

## 2023-09-06 DIAGNOSIS — R73.9 HYPERGLYCEMIA: ICD-10-CM

## 2023-09-06 DIAGNOSIS — R79.89 AZOTEMIA: ICD-10-CM

## 2023-09-06 PROBLEM — F41.8 ANXIETY WITH DEPRESSION: Status: RESOLVED | Noted: 2023-02-25 | Resolved: 2023-09-06

## 2023-09-06 PROBLEM — D64.9 ANEMIA, NORMOCYTIC NORMOCHROMIC: Status: RESOLVED | Noted: 2023-02-25 | Resolved: 2023-09-06

## 2023-09-06 PROBLEM — N39.0 UTI (URINARY TRACT INFECTION): Status: RESOLVED | Noted: 2023-02-25 | Resolved: 2023-09-06

## 2023-09-06 PROBLEM — G62.9 NEUROPATHY: Status: RESOLVED | Noted: 2023-02-25 | Resolved: 2023-09-06

## 2023-09-06 PROBLEM — E78.00 PURE HYPERCHOLESTEROLEMIA WITH TARGET LOW DENSITY LIPOPROTEIN (LDL) CHOLESTEROL LESS THAN 130 MG/DL: Status: RESOLVED | Noted: 2023-02-25 | Resolved: 2023-09-06

## 2023-09-06 PROBLEM — R00.0 SINUS TACHYCARDIA: Status: RESOLVED | Noted: 2023-02-25 | Resolved: 2023-09-06

## 2023-09-06 PROBLEM — D68.59 HYPERCOAGULABLE STATE (MULTI): Status: RESOLVED | Noted: 2023-02-25 | Resolved: 2023-09-06

## 2023-09-06 PROBLEM — E66.811 CLASS 1 OBESITY DUE TO EXCESS CALORIES WITH SERIOUS COMORBIDITY AND BODY MASS INDEX (BMI) OF 33.0 TO 33.9 IN ADULT: Status: ACTIVE | Noted: 2023-09-06

## 2023-09-06 PROBLEM — S39.012A LUMBAR STRAIN: Status: RESOLVED | Noted: 2023-02-25 | Resolved: 2023-09-06

## 2023-09-06 PROBLEM — S33.5XXA LUMBAR SPRAIN: Status: RESOLVED | Noted: 2023-02-25 | Resolved: 2023-09-06

## 2023-09-06 PROCEDURE — 3008F BODY MASS INDEX DOCD: CPT | Performed by: INTERNAL MEDICINE

## 2023-09-06 PROCEDURE — 3078F DIAST BP <80 MM HG: CPT | Performed by: INTERNAL MEDICINE

## 2023-09-06 PROCEDURE — 1036F TOBACCO NON-USER: CPT | Performed by: INTERNAL MEDICINE

## 2023-09-06 PROCEDURE — 3074F SYST BP LT 130 MM HG: CPT | Performed by: INTERNAL MEDICINE

## 2023-09-06 PROCEDURE — 99215 OFFICE O/P EST HI 40 MIN: CPT | Performed by: INTERNAL MEDICINE

## 2023-09-06 RX ORDER — IPRATROPIUM BROMIDE AND ALBUTEROL SULFATE 2.5; .5 MG/3ML; MG/3ML
3 SOLUTION RESPIRATORY (INHALATION)
COMMUNITY
Start: 2021-01-06

## 2023-09-06 RX ORDER — KETAMINE HCL IN 0.9 % NACL 1000MG/100
PLASTIC BAG, INJECTION (ML) INTRAVENOUS
COMMUNITY

## 2023-09-06 RX ORDER — ASPIRIN 325 MG
TABLET, DELAYED RELEASE (ENTERIC COATED) ORAL
Qty: 13 CAPSULE | Refills: 3 | Status: SHIPPED | OUTPATIENT
Start: 2023-09-06

## 2023-09-06 RX ORDER — BUPROPION HYDROCHLORIDE 300 MG/1
TABLET ORAL
COMMUNITY
Start: 2022-11-01 | End: 2023-12-19 | Stop reason: SDUPTHER

## 2023-09-06 RX ORDER — GABAPENTIN 600 MG/1
1 TABLET ORAL 3 TIMES DAILY
COMMUNITY
End: 2023-11-01 | Stop reason: SDUPTHER

## 2023-09-06 RX ORDER — HYDROCODONE BITARTRATE AND ACETAMINOPHEN 10; 325 MG/1; MG/1
1 TABLET ORAL EVERY 12 HOURS PRN
COMMUNITY
Start: 2023-08-14 | End: 2023-10-11 | Stop reason: SDUPTHER

## 2023-09-06 NOTE — PATIENT INSTRUCTIONS
Thank you very much for coming.  I am very happy to see you.    Thank you for seeing your specialists, especially your VASCULAR SURGEON, who has been very helpful, and continues to be willing to help you.  In fact, she is also doing the job of your HEMATOLOGIST, and that is why your hematologist said that he does not need to see you at this time.    In the meantime, I can continue to check your folic acid levels, as well as other blood markers for anemia.  I will also check your urine.  I will do all of these in about 2 months with the benefit of FASTING laboratory examinations.    Please continue following with your vascular surgeon, as well as your WARFARIN CLINIC recommendations.  We need to keep your PT/INR blood thinness THERAPEUTIC.  Thank you for taking care of yourself.  Watch out for gum or nose bleeding, or any blood in urine or stool.  Call me please if you notice anything.  Likewise, please let me know if you are suffering from new lightheadedness or palpitations or any of these symptoms that might mean that you might be having low blood count.  Watch out for acid indigestion symptoms.  Of course, if urgent, please dial 911 first.    Please continue following with your CHRONIC PAIN specialist.  I am glad that you are doing well with your current medications, especially with your history of difficulties with use of OPIATES.    I am also glad to hear that you are doing well with your PHYSICAL THERAPIST.  Please continue using your cane.    I am very happy to continue to follow you with your challenges.  I am glad that the BUPROPION regimen is working well for you!  Please let me know if you feel more rundown than usual.    I do understand that you contracted COVID in JULY.  This means that you cannot receive the COVID vaccination for 90 days, or at least until after October.  Please continue to follow what Dr. Frazier and the CDC recommended.  Please be careful and avoid crowds, even after you have already  recovered.  This does not mean that you cannot get COVID, because you could still be infected with a different/new strain.    I would recommend to wait for the latest COVID BOOSTER, and when it is out, wait at least 2 weeks and find out if other patients take it and if they do well with it.  There is no hurry, but you just have to be careful about exposure.    In the meantime, you will benefit from the latest INFLUENZA vaccination.  You only need the low-dose regimen.  I would recommend doing it September 15 or later, so that you can make sure that it lasts until mid March.  It is usually good for 6 months.    I do understand that you are no longer eligible for SEMAGLUTIDE/Ozempic/Wegovy.  Maximize diet and exercise, and we will review your options when you return in 2 months.    Please restart VITAMIN D, very important, very helpful in many ways.  I will reorder this for you.    You have had a history of muscle strain and kidney strain.  Drink lots of fluids throughout the day.  Avoid salt.    I am glad to hear that you will have your Mammogram done soon.  Please pursue this.    Please come back in 2 months.  Do some FASTING laboratory examinations via [facility], then see me soon after.  It will be time for your Medicare Wellness/welcome to Medicare visit!  Until then, please continue to take care of yourself and each other, and please continue to pray for our recovery from this pandemic.            0  Return in 2 months.  40 minutes please.  FASTING laboratory examination via [facility], then see me soon after.  Reassess hemodynamics, cardiovascular risk, coordinate with vascular surgery.  Reassess mood, energy, function, preventive strategies, follow-up with chronic pain, possibly physical therapy.  Prepare for winter.  Reassess vaccination profile, COVID status.            0

## 2023-09-12 PROBLEM — I73.9 PAD (PERIPHERAL ARTERY DISEASE) (CMS-HCC): Status: ACTIVE | Noted: 2023-09-12

## 2023-09-12 PROBLEM — H54.7 ALTERATION IN VISION: Status: ACTIVE | Noted: 2019-08-19

## 2023-09-12 PROBLEM — Z86.73 HISTORY OF TIA (TRANSIENT ISCHEMIC ATTACK): Status: ACTIVE | Noted: 2020-05-20

## 2023-09-12 PROBLEM — G43.109 COMPLICATED MIGRAINE: Status: ACTIVE | Noted: 2021-11-24

## 2023-09-12 PROBLEM — R41.3 MEMORY LOSS: Status: ACTIVE | Noted: 2022-03-07

## 2023-09-12 PROBLEM — R47.02 DYSPHASIA: Status: ACTIVE | Noted: 2021-03-15

## 2023-09-12 PROBLEM — E53.8 B12 DEFICIENCY: Status: ACTIVE | Noted: 2022-01-07

## 2023-09-12 PROBLEM — G45.3 AMAUROSIS FUGAX OF LEFT EYE: Status: ACTIVE | Noted: 2020-02-24

## 2023-09-12 PROBLEM — Z86.16 HISTORY OF COVID-19: Status: ACTIVE | Noted: 2022-01-07

## 2023-09-12 PROBLEM — G43.009 MIGRAINE WITHOUT AURA, NOT REFRACTORY: Status: ACTIVE | Noted: 2019-08-20

## 2023-09-12 PROBLEM — R20.0 FACIAL NUMBNESS: Status: ACTIVE | Noted: 2022-03-07

## 2023-09-12 PROBLEM — H54.7 VISUAL IMPAIRMENT: Status: ACTIVE | Noted: 2020-02-24

## 2023-09-12 PROBLEM — R41.89 COGNITIVE CHANGES: Status: ACTIVE | Noted: 2021-03-15

## 2023-09-12 PROBLEM — F33.41 RECURRENT MAJOR DEPRESSION IN PARTIAL REMISSION (CMS-HCC): Status: ACTIVE | Noted: 2020-02-24

## 2023-09-12 PROBLEM — F19.21 H/O: DRUG DEPENDENCY (MULTI): Status: ACTIVE | Noted: 2018-05-22

## 2023-09-12 PROBLEM — I10 HTN (HYPERTENSION): Status: ACTIVE | Noted: 2019-08-21

## 2023-09-12 PROBLEM — M19.90 ARTHRITIS: Status: ACTIVE | Noted: 2021-01-15

## 2023-09-12 PROBLEM — J45.909 REACTIVE AIRWAY DISEASE (HHS-HCC): Status: ACTIVE | Noted: 2020-03-21

## 2023-09-25 DIAGNOSIS — I74.9 ARTERIAL EMBOLISM (MULTI): Primary | ICD-10-CM

## 2023-09-25 LAB
INR IN PPP BY COAGULATION ASSAY EXTERNAL: 1.6
PROTHROMBIN TIME (PT) IN PPP BY COAGULATION ASSAY EXTERNAL: NORMAL SECONDS

## 2023-10-02 ENCOUNTER — ANTICOAGULATION - WARFARIN VISIT (OUTPATIENT)
Dept: CARDIOLOGY | Facility: CLINIC | Age: 63
End: 2023-10-02
Payer: COMMERCIAL

## 2023-10-02 DIAGNOSIS — I74.9 ARTERIAL EMBOLISM (MULTI): Primary | ICD-10-CM

## 2023-10-02 LAB
POC INR: 2.4
POC PROTHROMBIN TIME: NORMAL

## 2023-10-02 PROCEDURE — 85610 PROTHROMBIN TIME: CPT | Mod: QW

## 2023-10-02 PROCEDURE — 99211 OFF/OP EST MAY X REQ PHY/QHP: CPT | Performed by: INTERNAL MEDICINE

## 2023-10-02 NOTE — PROGRESS NOTES
Patient identification verified with 2 identifiers.    Location: [facility] - [address] [place] [id number] [phone]     Referring Physician: Dr. Stephan Martins  Enrollment/ Re-enrollment date: 2024   INR Goal: 2.0-3.0  INR monitoring is per [facility] protocol.  Anticoagulation Medication: warfarin  Indication:  Arterial Embolism    Subjective   Bleeding signs/symptoms: No    Bruising: No   Major bleeding event: No  Thrombosis signs/symptoms: No  Thromboembolic event: No  Missed doses: No  Extra doses: No  Medication changes: No  Dietary changes: No  Change in health: No  Change in activity: No  Alcohol: No  Other concerns: No    Upcoming Surgeries:  Does the Patient Have any upcoming surgeries that require interruption in anticoagulation therapy? no  Does the patient require bridging? no      Anticoagulation Summary  As of 10/2/2023      INR goal:  2.0-3.0   TTR:  --   INR used for dosin.40 (10/2/2023)   Weekly warfarin total:  24 mg               Assessment/Plan   Therapeutic     1. New dose: no change    2. Next INR: 1 week      Education provided to patient during the visit:  Patient instructed to call in interim with questions, concerns and changes.

## 2023-10-09 ENCOUNTER — ANTICOAGULATION - WARFARIN VISIT (OUTPATIENT)
Dept: CARDIOLOGY | Facility: CLINIC | Age: 63
End: 2023-10-09
Payer: COMMERCIAL

## 2023-10-09 DIAGNOSIS — I74.9 ARTERIAL EMBOLISM (MULTI): Primary | ICD-10-CM

## 2023-10-09 LAB
POC INR: 1.7 (ref 2–3)
POC PROTHROMBIN TIME: ABNORMAL

## 2023-10-09 PROCEDURE — 99211 OFF/OP EST MAY X REQ PHY/QHP: CPT | Performed by: INTERNAL MEDICINE

## 2023-10-09 PROCEDURE — 85610 PROTHROMBIN TIME: CPT

## 2023-10-09 PROCEDURE — 85610 PROTHROMBIN TIME: CPT | Mod: QW | Performed by: INTERNAL MEDICINE

## 2023-10-09 NOTE — PROGRESS NOTES
Patient identification verified with 2 identifiers.    Location: [facility] - [address] [place] [id number] [phone]     Referring Physician: Dr. Stephan Martins  Enrollment/ Re-enrollment date: 2024   INR Goal: 2.0-3.0  INR monitoring is per [facility] protocol.  Anticoagulation Medication: warfarin  Indication:  Arterial Embolism    Subjective   Bleeding signs/symptoms: No    Bruising: No   Major bleeding event: No  Thrombosis signs/symptoms: No  Thromboembolic event: No  Missed doses: No  Extra doses: No  Medication changes: No  Dietary changes: Yes.  Pt started a Protein Drink supplement from [employer] last week and will continue.  Change in health: No  Change in activity: No  Alcohol: No  Other concerns: No    Upcoming Surgeries:  Does the Patient Have any upcoming surgeries that require interruption in anticoagulation therapy? no  Does the patient require bridging? no      Anticoagulation Summary  As of 10/9/2023      INR goal:  2.0-3.0   TTR:  31.7 % (4 d)   INR used for dosin.70 (10/9/2023)   Weekly warfarin total:  25.5 mg               Assessment/Plan   Subtherapeutic     1. New dose:  Will Increase Pt's weekly dosage by 1.5mg.   Pt was 1.6 on  and was given 25.5mg for the week and INR was 2.4.  Pt denies missed doses.  2. Next INR: 1 week      Education provided to patient during the visit:  Patient instructed to call in interim with questions, concerns and changes.   Patient educated on interactions between medications and warfarin.   Patient educated on dietary consistency in vitamin k consumption.   Patient educated on affects of alcohol consumption while taking warfarin.   Patient educated on signs of bleeding/clotting.   Patient educated on compliance with dosing, follow up appointments, and prescribed plan of care.

## 2023-10-11 DIAGNOSIS — I73.9 PAD (PERIPHERAL ARTERY DISEASE) (CMS-HCC): Primary | ICD-10-CM

## 2023-10-12 RX ORDER — HYDROCODONE BITARTRATE AND ACETAMINOPHEN 10; 325 MG/1; MG/1
1 TABLET ORAL EVERY 12 HOURS PRN
Qty: 60 TABLET | Refills: 0 | Status: SHIPPED | OUTPATIENT
Start: 2023-10-13 | End: 2023-10-17

## 2023-10-16 ENCOUNTER — TELEPHONE (OUTPATIENT)
Dept: PAIN MEDICINE | Facility: CLINIC | Age: 63
End: 2023-10-16

## 2023-10-16 ENCOUNTER — ANTICOAGULATION - WARFARIN VISIT (OUTPATIENT)
Dept: CARDIOLOGY | Facility: CLINIC | Age: 63
End: 2023-10-16
Payer: COMMERCIAL

## 2023-10-16 DIAGNOSIS — M54.16 LUMBAR RADICULAR PAIN: ICD-10-CM

## 2023-10-16 DIAGNOSIS — I73.9 PAD (PERIPHERAL ARTERY DISEASE) (CMS-HCC): Primary | ICD-10-CM

## 2023-10-16 DIAGNOSIS — I74.9 ARTERIAL EMBOLISM (MULTI): Primary | ICD-10-CM

## 2023-10-16 LAB
POC INR: 1.9 (ref 2–3)
POC PROTHROMBIN TIME: ABNORMAL

## 2023-10-16 PROCEDURE — 99211 OFF/OP EST MAY X REQ PHY/QHP: CPT | Mod: 27 | Performed by: INTERNAL MEDICINE

## 2023-10-16 PROCEDURE — 85610 PROTHROMBIN TIME: CPT

## 2023-10-16 PROCEDURE — 85610 PROTHROMBIN TIME: CPT | Mod: QW | Performed by: INTERNAL MEDICINE

## 2023-10-16 NOTE — PROGRESS NOTES
Patient identification verified with 2 identifiers.    Location: [facility] - [address] [place] [id number] [phone]     Referring Physician: Dr. Stephan Martins  Enrollment/ Re-enrollment date: 2024   INR Goal: 2.0-3.0  INR monitoring is per [facility] protocol.  Anticoagulation Medication: warfarin  Indication:  Arterial Embolism    Subjective   Bleeding signs/symptoms: No    Bruising: No   Major bleeding event: No  Thrombosis signs/symptoms: No  Thromboembolic event: No  Missed doses: No  Extra doses: No  Medication changes: Yes.  Pt has not had her Hydrocodone in approx. 3-4 days which she usually takes 2 per day.  Dietary changes: No.  Pt started a Protein Drink supplement from [employer] last week and will continue.  Change in health: No  Change in activity: No  Alcohol: No  Other concerns: No    Upcoming Surgeries:  Does the Patient Have any upcoming surgeries that require interruption in anticoagulation therapy? no  Does the patient require bridging? no    An Increase was made in weekly dosage last visit.  Anticoagulation Summary  As of 10/16/2023      INR goal:  2.0-3.0   TTR:  11.5 % (1.6 wk)   INR used for dosin.90 (10/16/2023)   Weekly warfarin total:  27 mg               Assessment/Plan   Subtherapeutic     1. New dose:  Will Increase Pt's weekly dosage by 1.5mg, approx. 5%..   Pt denies missed doses.  2. Next INR: 1 week      Education provided to patient during the visit:  Patient instructed to call in interim with questions, concerns and changes.   Patient educated on interactions between medications and warfarin.   Patient educated on dietary consistency in vitamin k consumption.   Patient educated on affects of alcohol consumption while taking warfarin.   Patient educated on signs of bleeding/clotting.   Patient educated on compliance with dosing, follow up appointments, and prescribed plan of care.

## 2023-10-17 RX ORDER — NALOXONE HYDROCHLORIDE 4 MG/.1ML
4 SPRAY NASAL AS NEEDED
Qty: 2 EACH | Refills: 0 | Status: SHIPPED | OUTPATIENT
Start: 2023-10-17 | End: 2023-10-19

## 2023-10-17 RX ORDER — HYDROCODONE BITARTRATE AND ACETAMINOPHEN 5; 325 MG/1; MG/1
2 TABLET ORAL 2 TIMES DAILY PRN
Qty: 120 TABLET | Refills: 0 | Status: SHIPPED | OUTPATIENT
Start: 2023-10-17 | End: 2023-11-16 | Stop reason: SDUPTHER

## 2023-10-23 ENCOUNTER — ANTICOAGULATION - WARFARIN VISIT (OUTPATIENT)
Dept: CARDIOLOGY | Facility: CLINIC | Age: 63
End: 2023-10-23
Payer: COMMERCIAL

## 2023-10-23 DIAGNOSIS — I74.9 ARTERIAL EMBOLISM (MULTI): Primary | ICD-10-CM

## 2023-10-23 LAB
POC INR: 2.8 (ref 2–3)
POC PROTHROMBIN TIME: NORMAL

## 2023-10-23 PROCEDURE — 85610 PROTHROMBIN TIME: CPT | Mod: QW | Performed by: INTERNAL MEDICINE

## 2023-10-23 PROCEDURE — 99211 OFF/OP EST MAY X REQ PHY/QHP: CPT | Performed by: INTERNAL MEDICINE

## 2023-10-23 NOTE — PROGRESS NOTES
Patient identification verified with 2 identifiers.    Location: [facility] - [address] [place] [id number] [phone]     Referring Physician: Dr. Stephan Martins  Enrollment/ Re-enrollment date: 2024   INR Goal: 2.0-3.0  INR monitoring is per [facility] protocol.  Anticoagulation Medication: warfarin  Indication:  Arterial Embolism    Subjective   Bleeding signs/symptoms: No    Bruising: No   Major bleeding event: No  Thrombosis signs/symptoms: No  Thromboembolic event: No  Missed doses: No  Extra doses: No  Medication changes: No.    Dietary changes: No.  Pt started a Protein Drink supplement from [employer] last week and will continue.  Change in health: No  Change in activity: No  Alcohol: No  Other concerns: No    Upcoming Surgeries:  Does the Patient Have any upcoming surgeries that require interruption in anticoagulation therapy? no  Does the patient require bridging? no    An Increase was made in weekly dosage last visit.  24mg weekly to 25.5mg weekly  Anticoagulation Summary  As of 10/23/2023      INR goal:  2.0-3.0   TTR:  41.6 % (2.6 wk)   INR used for dosin.80 (10/23/2023)   Weekly warfarin total:  27 mg               Assessment/Plan   Subtherapeutic     1. New dose: no change.  25.5mg weekly.    2. Next INR: 1 week.  Can r/s in 2 weeks if therapeutic.      Education provided to patient during the visit:  Patient instructed to call in interim with questions, concerns and changes.   Patient educated on interactions between medications and warfarin.   Patient educated on dietary consistency in vitamin k consumption.   Patient educated on affects of alcohol consumption while taking warfarin.   Patient educated on signs of bleeding/clotting.   Patient educated on compliance with dosing, follow up appointments, and prescribed plan of care.

## 2023-10-25 RX ORDER — ONDANSETRON HYDROCHLORIDE 2 MG/ML
4 INJECTION, SOLUTION INTRAVENOUS ONCE
Status: CANCELLED | OUTPATIENT
Start: 2023-10-30 | End: 2023-10-30

## 2023-10-25 RX ORDER — ALBUTEROL SULFATE 0.83 MG/ML
3 SOLUTION RESPIRATORY (INHALATION) AS NEEDED
Status: CANCELLED | OUTPATIENT
Start: 2023-10-30

## 2023-10-25 RX ORDER — NITROGLYCERIN 0.4 MG/1
0.4 TABLET SUBLINGUAL ONCE
Status: CANCELLED | OUTPATIENT
Start: 2023-10-30 | End: 2023-10-30

## 2023-10-25 RX ORDER — DIPHENHYDRAMINE HYDROCHLORIDE 50 MG/ML
50 INJECTION INTRAMUSCULAR; INTRAVENOUS AS NEEDED
Status: CANCELLED | OUTPATIENT
Start: 2023-10-30

## 2023-10-25 RX ORDER — METOPROLOL TARTRATE 25 MG/1
25 TABLET, FILM COATED ORAL ONCE
Status: CANCELLED | OUTPATIENT
Start: 2023-10-30 | End: 2023-10-30

## 2023-10-25 RX ORDER — FAMOTIDINE 10 MG/ML
20 INJECTION INTRAVENOUS ONCE AS NEEDED
Status: CANCELLED | OUTPATIENT
Start: 2023-10-30

## 2023-10-25 RX ORDER — EPINEPHRINE 0.3 MG/.3ML
0.3 INJECTION SUBCUTANEOUS EVERY 5 MIN PRN
Status: CANCELLED | OUTPATIENT
Start: 2023-10-30

## 2023-10-30 ENCOUNTER — INFUSION (OUTPATIENT)
Dept: INFUSION THERAPY | Facility: CLINIC | Age: 63
End: 2023-10-30
Payer: COMMERCIAL

## 2023-10-30 ENCOUNTER — APPOINTMENT (OUTPATIENT)
Dept: CARDIOLOGY | Facility: CLINIC | Age: 63
End: 2023-10-30
Payer: COMMERCIAL

## 2023-10-30 ENCOUNTER — ANTICOAGULATION - WARFARIN VISIT (OUTPATIENT)
Dept: CARDIOLOGY | Facility: CLINIC | Age: 63
End: 2023-10-30
Payer: COMMERCIAL

## 2023-10-30 VITALS
HEART RATE: 74 BPM | RESPIRATION RATE: 15 BRPM | SYSTOLIC BLOOD PRESSURE: 159 MMHG | TEMPERATURE: 97.3 F | DIASTOLIC BLOOD PRESSURE: 67 MMHG | OXYGEN SATURATION: 97 %

## 2023-10-30 DIAGNOSIS — I74.9 ARTERIAL EMBOLISM (MULTI): Primary | ICD-10-CM

## 2023-10-30 DIAGNOSIS — R29.818 NEUROGENIC CLAUDICATION: ICD-10-CM

## 2023-10-30 DIAGNOSIS — M54.16 LUMBAR RADICULAR PAIN: ICD-10-CM

## 2023-10-30 DIAGNOSIS — U09.9 POST COVID-19 CONDITION, UNSPECIFIED: ICD-10-CM

## 2023-10-30 PROCEDURE — 96365 THER/PROPH/DIAG IV INF INIT: CPT | Mod: INF

## 2023-10-30 PROCEDURE — 96368 THER/DIAG CONCURRENT INF: CPT | Mod: INF

## 2023-10-30 PROCEDURE — 2500000005 HC RX 250 GENERAL PHARMACY W/O HCPCS: Performed by: NURSE PRACTITIONER

## 2023-10-30 PROCEDURE — 2500000004 HC RX 250 GENERAL PHARMACY W/ HCPCS (ALT 636 FOR OP/ED): Performed by: NURSE PRACTITIONER

## 2023-10-30 PROCEDURE — 96375 TX/PRO/DX INJ NEW DRUG ADDON: CPT | Mod: INF

## 2023-10-30 RX ORDER — EPINEPHRINE 0.3 MG/.3ML
0.3 INJECTION SUBCUTANEOUS EVERY 5 MIN PRN
Status: CANCELLED | OUTPATIENT
Start: 2023-11-13

## 2023-10-30 RX ORDER — FAMOTIDINE 10 MG/ML
20 INJECTION INTRAVENOUS ONCE AS NEEDED
Status: CANCELLED | OUTPATIENT
Start: 2023-11-13

## 2023-10-30 RX ORDER — DIPHENHYDRAMINE HYDROCHLORIDE 50 MG/ML
50 INJECTION INTRAMUSCULAR; INTRAVENOUS AS NEEDED
Status: CANCELLED | OUTPATIENT
Start: 2023-11-13

## 2023-10-30 RX ORDER — METOPROLOL TARTRATE 25 MG/1
25 TABLET, FILM COATED ORAL ONCE
Status: CANCELLED | OUTPATIENT
Start: 2023-11-13 | End: 2023-11-13

## 2023-10-30 RX ORDER — KETOROLAC TROMETHAMINE 30 MG/ML
INJECTION, SOLUTION INTRAMUSCULAR; INTRAVENOUS
Status: DISPENSED
Start: 2023-10-30 | End: 2023-10-31

## 2023-10-30 RX ORDER — NITROGLYCERIN 0.4 MG/1
0.4 TABLET SUBLINGUAL ONCE
Status: CANCELLED | OUTPATIENT
Start: 2023-11-13 | End: 2023-11-13

## 2023-10-30 RX ORDER — ALBUTEROL SULFATE 0.83 MG/ML
3 SOLUTION RESPIRATORY (INHALATION) AS NEEDED
Status: CANCELLED | OUTPATIENT
Start: 2023-11-13

## 2023-10-30 RX ORDER — KETOROLAC TROMETHAMINE 30 MG/ML
30 INJECTION, SOLUTION INTRAMUSCULAR; INTRAVENOUS ONCE
Status: CANCELLED | OUTPATIENT
Start: 2023-11-13 | End: 2023-11-13

## 2023-10-30 RX ORDER — ONDANSETRON HYDROCHLORIDE 2 MG/ML
4 INJECTION, SOLUTION INTRAVENOUS ONCE
Status: CANCELLED | OUTPATIENT
Start: 2023-11-13 | End: 2023-11-13

## 2023-10-30 RX ORDER — KETOROLAC TROMETHAMINE 30 MG/ML
30 INJECTION, SOLUTION INTRAMUSCULAR; INTRAVENOUS ONCE
Status: COMPLETED | OUTPATIENT
Start: 2023-10-30 | End: 2023-10-30

## 2023-10-30 RX ADMIN — PROPOFOL 100 MG: 10 INJECTION, EMULSION INTRAVENOUS at 14:51

## 2023-10-30 RX ADMIN — KETOROLAC TROMETHAMINE 30 MG: 30 INJECTION INTRAMUSCULAR; INTRAVENOUS at 14:50

## 2023-10-30 RX ADMIN — Medication: at 14:52

## 2023-10-30 SDOH — ECONOMIC STABILITY: FOOD INSECURITY: WITHIN THE PAST 12 MONTHS, THE FOOD YOU BOUGHT JUST DIDN'T LAST AND YOU DIDN'T HAVE MONEY TO GET MORE.: NEVER TRUE

## 2023-10-30 SDOH — ECONOMIC STABILITY: FOOD INSECURITY: WITHIN THE PAST 12 MONTHS, YOU WORRIED THAT YOUR FOOD WOULD RUN OUT BEFORE YOU GOT MONEY TO BUY MORE.: NEVER TRUE

## 2023-10-30 ASSESSMENT — COLUMBIA-SUICIDE SEVERITY RATING SCALE - C-SSRS
1. IN THE PAST MONTH, HAVE YOU WISHED YOU WERE DEAD OR WISHED YOU COULD GO TO SLEEP AND NOT WAKE UP?: NO
6. HAVE YOU EVER DONE ANYTHING, STARTED TO DO ANYTHING, OR PREPARED TO DO ANYTHING TO END YOUR LIFE?: NO

## 2023-10-30 ASSESSMENT — PAIN - FUNCTIONAL ASSESSMENT: PAIN_FUNCTIONAL_ASSESSMENT: 0-10

## 2023-10-30 ASSESSMENT — PAIN SCALES - GENERAL
PAINLEVEL_OUTOF10: 0 - NO PAIN

## 2023-10-30 ASSESSMENT — LIFESTYLE VARIABLES: HOW OFTEN DO YOU HAVE A DRINK CONTAINING ALCOHOL: NEVER

## 2023-10-30 ASSESSMENT — ENCOUNTER SYMPTOMS
OCCASIONAL FEELINGS OF UNSTEADINESS: 0
LOSS OF SENSATION IN FEET: 0

## 2023-10-30 ASSESSMENT — PATIENT HEALTH QUESTIONNAIRE - PHQ9
2. FEELING DOWN, DEPRESSED OR HOPELESS: NOT AT ALL
1. LITTLE INTEREST OR PLEASURE IN DOING THINGS: NOT AT ALL
SUM OF ALL RESPONSES TO PHQ9 QUESTIONS 1 AND 2: 0

## 2023-10-30 NOTE — PROGRESS NOTES
S: Patient here for 8 opioid sparing pain infusion. Patient reports 60% reduction in pain after last infusion that lasted 3 weeks.    Purpose of pain infusion meds explained along with potential side effects.  Patient verbalized understanding.    B: Pain Issues 1/10 left knee to toes    A: Patient currently has pain described on flow sheet documentation. Designated  is anmol. Patient last ate solid food 16 hours ago, and had liquid 1 hours ago.    R: Plan; Obtain IV access, do patient risk assessment, and start opioid sparing infusion as ordered. Monitoring for S/S of adverse reactions.    Post infusion teaching provided via handout and verbal instruction. Patient verbalized understanding. VSS, Patient states pain is 0. Will assist patient to waiting car via wheelchair.

## 2023-10-31 ENCOUNTER — ANTICOAGULATION - WARFARIN VISIT (OUTPATIENT)
Dept: CARDIOLOGY | Facility: CLINIC | Age: 63
End: 2023-10-31
Payer: COMMERCIAL

## 2023-10-31 DIAGNOSIS — I74.9 ARTERIAL EMBOLISM (MULTI): ICD-10-CM

## 2023-10-31 LAB
POC INR: 2.4
POC PROTHROMBIN TIME: NORMAL

## 2023-10-31 PROCEDURE — 85610 PROTHROMBIN TIME: CPT | Mod: QW

## 2023-10-31 PROCEDURE — 99211 OFF/OP EST MAY X REQ PHY/QHP: CPT | Performed by: INTERNAL MEDICINE

## 2023-10-31 NOTE — PROGRESS NOTES
Patient identification verified with 2 identifiers.    Location: [facility] - [address] [place] [id number] [phone]     Referring Physician: Dr. Stephan Martins  Enrollment/ Re-enrollment date: 2024   INR Goal: 2.0-3.0  INR monitoring is per [facility] protocol.  Anticoagulation Medication: warfarin  Indication:  Arterial Embolism    Subjective   Bleeding signs/symptoms: No    Bruising: No   Major bleeding event: No  Thrombosis signs/symptoms: No  Thromboembolic event: No  Missed doses: No  Extra doses: No  Medication changes: No  Dietary changes: No  Change in health: No  Change in activity: No  Alcohol: No  Other concerns: No    Upcoming Surgeries:  Does the Patient Have any upcoming surgeries that require interruption in anticoagulation therapy? no  Does the patient require bridging? no    An Increase was made in weekly dosage last visit.  24mg weekly to 25.5mg weekly  Anticoagulation Summary  As of 10/31/2023      INR goal:  2.0-3.0   TTR:  60.5 % (3.7 wk)   INR used for dosin.40 (10/31/2023)   Weekly warfarin total:  27 mg               Assessment/Plan   Therapeutic     1. New dose: no change. Dose maintained   2. Next INR: 2 weeks      Education provided to patient during the visit:  Patient instructed to call in interim with questions, concerns and changes.   Patient educated on interactions between medications and warfarin.   Patient educated on dietary consistency in vitamin k consumption.   Patient educated on affects of alcohol consumption while taking warfarin.   Patient educated on signs of bleeding/clotting.   Patient educated on compliance with dosing, follow up appointments, and prescribed plan of care.

## 2023-11-01 DIAGNOSIS — M54.31 SCIATICA OF RIGHT SIDE: Primary | ICD-10-CM

## 2023-11-01 DIAGNOSIS — M54.16 LUMBAR RADICULAR PAIN: ICD-10-CM

## 2023-11-01 RX ORDER — GABAPENTIN 600 MG/1
600 TABLET ORAL 3 TIMES DAILY
Qty: 90 TABLET | Refills: 3 | Status: SHIPPED | OUTPATIENT
Start: 2023-11-01 | End: 2024-03-21 | Stop reason: SDUPTHER

## 2023-11-06 DIAGNOSIS — Z86.718 HISTORY OF DVT (DEEP VEIN THROMBOSIS): ICD-10-CM

## 2023-11-06 DIAGNOSIS — I74.3 ARTERIAL EMBOLISM AND THROMBOSIS OF LOWER EXTREMITY (MULTI): ICD-10-CM

## 2023-11-08 RX ORDER — WARFARIN 3 MG/1
TABLET ORAL
Qty: 96 TABLET | Refills: 0 | Status: SHIPPED | OUTPATIENT
Start: 2023-11-08 | End: 2024-01-23 | Stop reason: SDUPTHER

## 2023-11-13 ENCOUNTER — HOSPITAL ENCOUNTER (OUTPATIENT)
Dept: VASCULAR MEDICINE | Facility: HOSPITAL | Age: 63
Discharge: HOME | End: 2023-11-13
Payer: COMMERCIAL

## 2023-11-13 ENCOUNTER — OFFICE VISIT (OUTPATIENT)
Dept: VASCULAR SURGERY | Facility: HOSPITAL | Age: 63
End: 2023-11-13
Payer: COMMERCIAL

## 2023-11-13 ENCOUNTER — APPOINTMENT (OUTPATIENT)
Dept: VASCULAR SURGERY | Facility: HOSPITAL | Age: 63
End: 2023-11-13
Payer: COMMERCIAL

## 2023-11-13 VITALS
BODY MASS INDEX: 33.8 KG/M2 | OXYGEN SATURATION: 94 % | HEIGHT: 64 IN | DIASTOLIC BLOOD PRESSURE: 82 MMHG | WEIGHT: 198 LBS | SYSTOLIC BLOOD PRESSURE: 138 MMHG | HEART RATE: 79 BPM

## 2023-11-13 DIAGNOSIS — I70.212 ATHEROSCLEROSIS OF NATIVE ARTERIES OF EXTREMITIES WITH INTERMITTENT CLAUDICATION, LEFT LEG (CMS-HCC): ICD-10-CM

## 2023-11-13 DIAGNOSIS — I73.9 PAD (PERIPHERAL ARTERY DISEASE) (CMS-HCC): ICD-10-CM

## 2023-11-13 DIAGNOSIS — I73.9 PAD (PERIPHERAL ARTERY DISEASE) (CMS-HCC): Primary | ICD-10-CM

## 2023-11-13 PROCEDURE — 93922 UPR/L XTREMITY ART 2 LEVELS: CPT

## 2023-11-13 PROCEDURE — 93922 UPR/L XTREMITY ART 2 LEVELS: CPT | Performed by: STUDENT IN AN ORGANIZED HEALTH CARE EDUCATION/TRAINING PROGRAM

## 2023-11-13 PROCEDURE — 93926 LOWER EXTREMITY STUDY: CPT

## 2023-11-13 PROCEDURE — 3079F DIAST BP 80-89 MM HG: CPT | Performed by: NURSE PRACTITIONER

## 2023-11-13 PROCEDURE — 1036F TOBACCO NON-USER: CPT | Performed by: NURSE PRACTITIONER

## 2023-11-13 PROCEDURE — 93926 LOWER EXTREMITY STUDY: CPT | Performed by: STUDENT IN AN ORGANIZED HEALTH CARE EDUCATION/TRAINING PROGRAM

## 2023-11-13 PROCEDURE — 3075F SYST BP GE 130 - 139MM HG: CPT | Performed by: NURSE PRACTITIONER

## 2023-11-13 PROCEDURE — 99214 OFFICE O/P EST MOD 30 MIN: CPT | Performed by: NURSE PRACTITIONER

## 2023-11-13 PROCEDURE — 3008F BODY MASS INDEX DOCD: CPT | Performed by: NURSE PRACTITIONER

## 2023-11-13 PROCEDURE — 99214 OFFICE O/P EST MOD 30 MIN: CPT | Mod: 25 | Performed by: NURSE PRACTITIONER

## 2023-11-13 ASSESSMENT — ENCOUNTER SYMPTOMS
APPETITE CHANGE: 0
DIZZINESS: 0
FEVER: 0
MYALGIAS: 0
VOMITING: 0
CONFUSION: 0
DIARRHEA: 0
NAUSEA: 0
FATIGUE: 0
ARTHRALGIAS: 0
WOUND: 0
HEADACHES: 0
ABDOMINAL PAIN: 0
DECREASED CONCENTRATION: 0
COLOR CHANGE: 0
WEAKNESS: 0
CHILLS: 0
PALPITATIONS: 0
COUGH: 0
CONSTIPATION: 0
SHORTNESS OF BREATH: 0

## 2023-11-13 ASSESSMENT — PAIN SCALES - GENERAL: PAINLEVEL: 0-NO PAIN

## 2023-11-13 NOTE — PROGRESS NOTES
Subjective   Angle Martins is a 63 y.o. female who presents for follow up appointment.   HPI  Angle Martins is a 63 year old female who has a history of PAD with ALI, and is s/p RLE thrombectomy, angioplasty and stenting of the right SFA, AK-pop and TP trunk angioplasty with 4 compartment fasciotomy with Dr. Cash on 12/2022.    At today's visit, the patient states that she is overall doing well. For her severe ischemic neuropathic pain, the patient continues to undergo Ketamine infusions and take Gabapentin, with moderate improvement of symptoms. The patient also states that she has been increasing her physical activity, including walking and swimming several times a week. She does reports some RLE heaviness and swelling after standing for long periods of time and after exercise. Elevation and mild compression helps improve symptoms. The patient denies any claudication or rest pain. She is not experiencing any current chest pain, shortness of breath, fever, chills, malaise, nausea, or vomiting. She continues to take her ASA and Coumadin daily as directed.    At today's visit, R MITZY 1.02 and Left MITZY 1.31. Right arterial duplex demonstrates elevated velocities at the popliteal level (384 cm/s).    Review of Systems   Constitutional:  Negative for appetite change, chills, fatigue and fever.   Eyes:  Negative for visual disturbance.   Respiratory:  Negative for cough and shortness of breath.    Cardiovascular:  Positive for leg swelling. Negative for chest pain and palpitations.        Patient reports some RLE swelling   Gastrointestinal:  Negative for abdominal pain, constipation, diarrhea, nausea and vomiting.   Musculoskeletal:  Negative for arthralgias and myalgias.   Skin:  Negative for color change, rash and wound.   Neurological:  Negative for dizziness, weakness and headaches.   Psychiatric/Behavioral:  Negative for confusion and decreased concentration.        Objective   Vascular Physical Exam  General:  Well developed and well nourished; not in acute distress  Eyes: Sclera non- icteric  Neck: No masses or scars  Cardiac: Regular rate and rhythm   Respiratory: non-labored breathing   GI: Abdomen soft, non-tender, non-distended  Vascular: Warm and well perfused; mild RLE edema present; multiphasic DP/PT signals RLE  Neurological: A&Ox3; no focal deficits  Skin: Warm and dry; No open ulcers or wounds  Psych: Appropriate mood and affect    Assessment/Plan   Diagnoses and all orders for this visit:  PAD (peripheral artery disease) (CMS/[facility])  -     Follow Up In Vascular Surgery; Future  -     Vascular US lower extremity arterial duplex right; Future  63 year old female s/p RLE thrombectomy, angioplasty and stenting of the right SFA and AK pop and TP trunk angioplasty with 4 compartment fasciotomy  No claudication or rest pain symptoms  Ischemic neuropathic pain being treated with ketamine infusions and gabapentin  R MITZY 1.02 (TBI 0.9)  Arterial duplex shows increased velocities at popliteal artery (384 cm/s)    Plan  -Continue ASA and Coumadin  -RTC in 3 months with repeat RLE duplex to evaluate velocities  -Continue elevation and light compression for RLE edema  -Follow up sooner with vascular surgery as needed

## 2023-11-14 ENCOUNTER — APPOINTMENT (OUTPATIENT)
Dept: CARDIOLOGY | Facility: CLINIC | Age: 63
End: 2023-11-14
Payer: COMMERCIAL

## 2023-11-14 ENCOUNTER — ANTICOAGULATION - WARFARIN VISIT (OUTPATIENT)
Dept: CARDIOLOGY | Facility: CLINIC | Age: 63
End: 2023-11-14
Payer: COMMERCIAL

## 2023-11-14 DIAGNOSIS — I74.9 ARTERIAL EMBOLISM (MULTI): Primary | ICD-10-CM

## 2023-11-15 ENCOUNTER — APPOINTMENT (OUTPATIENT)
Dept: PRIMARY CARE | Facility: CLINIC | Age: 63
End: 2023-11-15
Payer: COMMERCIAL

## 2023-11-16 DIAGNOSIS — M54.16 LUMBAR RADICULAR PAIN: ICD-10-CM

## 2023-11-16 DIAGNOSIS — I73.9 PAD (PERIPHERAL ARTERY DISEASE) (CMS-HCC): ICD-10-CM

## 2023-11-16 RX ORDER — HYDROCODONE BITARTRATE AND ACETAMINOPHEN 5; 325 MG/1; MG/1
2 TABLET ORAL 2 TIMES DAILY PRN
Qty: 120 TABLET | Refills: 0 | Status: SHIPPED | OUTPATIENT
Start: 2023-11-16 | End: 2023-12-14 | Stop reason: SDUPTHER

## 2023-11-20 ENCOUNTER — ANTICOAGULATION - WARFARIN VISIT (OUTPATIENT)
Dept: CARDIOLOGY | Facility: CLINIC | Age: 63
End: 2023-11-20
Payer: COMMERCIAL

## 2023-11-20 DIAGNOSIS — I74.9 ARTERIAL EMBOLISM (MULTI): Primary | ICD-10-CM

## 2023-11-20 LAB
POC INR: 2.3 (ref 2–3)
POC PROTHROMBIN TIME: NORMAL

## 2023-11-20 PROCEDURE — 99211 OFF/OP EST MAY X REQ PHY/QHP: CPT | Mod: 27 | Performed by: INTERNAL MEDICINE

## 2023-11-20 PROCEDURE — 85610 PROTHROMBIN TIME: CPT | Mod: QW | Performed by: INTERNAL MEDICINE

## 2023-11-20 NOTE — PROGRESS NOTES
Patient identification verified with 2 identifiers.    Location: [facility] - [address] [place] [id number] [phone]     Referring Physician: Dr. Stephan Martins  Enrollment/ Re-enrollment date: 2024   INR Goal: 2.0-3.0  INR monitoring is per [facility] protocol.  Anticoagulation Medication: warfarin  Indication:  Arterial Embolism    Subjective   Bleeding signs/symptoms: No    Bruising: No   Major bleeding event: No  Thrombosis signs/symptoms: No  Thromboembolic event: No  Missed doses: No  Extra doses: No  Medication changes: No  Dietary changes: No  Change in health: No  Change in activity: No  Alcohol: No  Other concerns: No    Upcoming Surgeries:  Does the Patient Have any upcoming surgeries that require interruption in anticoagulation therapy? no  Does the patient require bridging? no    Dose maintained after last visit.  Pt is taking 27mg of warfarin weekly.  Anticoagulation Summary  As of 2023      INR goal:  2.0-3.0   TTR:  77.2 % (1.5 mo)   INR used for dosin.30 (2023)   Weekly warfarin total:  27 mg               Assessment/Plan   Therapeutic     1. New dose: no change. 27mg weekly.  2. Next INR: 1 month.  Can r/s in 4 weeks if therapeutic.      Education provided to patient during the visit:  Patient instructed to call in interim with questions, concerns and changes.   Patient educated on interactions between medications and warfarin.   Patient educated on dietary consistency in vitamin k consumption.   Patient educated on affects of alcohol consumption while taking warfarin.   Patient educated on signs of bleeding/clotting.   Patient educated on compliance with dosing, follow up appointments, and prescribed plan of care.

## 2023-12-05 DIAGNOSIS — U09.9 POST COVID-19 CONDITION, UNSPECIFIED: ICD-10-CM

## 2023-12-05 DIAGNOSIS — R29.818 NEUROGENIC CLAUDICATION: ICD-10-CM

## 2023-12-05 DIAGNOSIS — M54.16 LUMBAR RADICULAR PAIN: Primary | ICD-10-CM

## 2023-12-05 RX ORDER — ALBUTEROL SULFATE 0.83 MG/ML
3 SOLUTION RESPIRATORY (INHALATION) AS NEEDED
Status: CANCELLED | OUTPATIENT
Start: 2023-12-05

## 2023-12-05 RX ORDER — NITROGLYCERIN 0.4 MG/1
0.4 TABLET SUBLINGUAL ONCE
Status: CANCELLED | OUTPATIENT
Start: 2023-12-05 | End: 2023-12-05

## 2023-12-05 RX ORDER — FAMOTIDINE 10 MG/ML
20 INJECTION INTRAVENOUS ONCE AS NEEDED
Status: CANCELLED | OUTPATIENT
Start: 2023-12-05

## 2023-12-05 RX ORDER — DIPHENHYDRAMINE HYDROCHLORIDE 50 MG/ML
25 INJECTION INTRAMUSCULAR; INTRAVENOUS ONCE
Status: CANCELLED | OUTPATIENT
Start: 2023-12-05 | End: 2023-12-05

## 2023-12-05 RX ORDER — ONDANSETRON HYDROCHLORIDE 2 MG/ML
4 INJECTION, SOLUTION INTRAVENOUS ONCE
Status: CANCELLED | OUTPATIENT
Start: 2023-12-05 | End: 2023-12-05

## 2023-12-05 RX ORDER — METOPROLOL TARTRATE 25 MG/1
25 TABLET, FILM COATED ORAL ONCE
Status: CANCELLED | OUTPATIENT
Start: 2023-12-05 | End: 2023-12-05

## 2023-12-05 RX ORDER — KETOROLAC TROMETHAMINE 30 MG/ML
30 INJECTION, SOLUTION INTRAMUSCULAR; INTRAVENOUS ONCE
Status: CANCELLED | OUTPATIENT
Start: 2023-12-05 | End: 2023-12-05

## 2023-12-05 RX ORDER — DIPHENHYDRAMINE HYDROCHLORIDE 50 MG/ML
50 INJECTION INTRAMUSCULAR; INTRAVENOUS AS NEEDED
Status: CANCELLED | OUTPATIENT
Start: 2023-12-05

## 2023-12-05 RX ORDER — METOCLOPRAMIDE HYDROCHLORIDE 5 MG/ML
10 INJECTION INTRAMUSCULAR; INTRAVENOUS ONCE
Status: CANCELLED | OUTPATIENT
Start: 2023-12-05 | End: 2023-12-05

## 2023-12-05 RX ORDER — EPINEPHRINE 0.3 MG/.3ML
0.3 INJECTION SUBCUTANEOUS EVERY 5 MIN PRN
Status: CANCELLED | OUTPATIENT
Start: 2023-12-05

## 2023-12-07 RX ORDER — ONDANSETRON HYDROCHLORIDE 2 MG/ML
4 INJECTION, SOLUTION INTRAVENOUS ONCE
Status: CANCELLED | OUTPATIENT
Start: 2023-12-08 | End: 2023-12-08

## 2023-12-07 RX ORDER — EPINEPHRINE 0.3 MG/.3ML
0.3 INJECTION SUBCUTANEOUS EVERY 5 MIN PRN
Status: CANCELLED | OUTPATIENT
Start: 2023-12-08

## 2023-12-07 RX ORDER — ALBUTEROL SULFATE 0.83 MG/ML
3 SOLUTION RESPIRATORY (INHALATION) AS NEEDED
Status: CANCELLED | OUTPATIENT
Start: 2023-12-08

## 2023-12-07 RX ORDER — DIPHENHYDRAMINE HYDROCHLORIDE 50 MG/ML
50 INJECTION INTRAMUSCULAR; INTRAVENOUS AS NEEDED
Status: CANCELLED | OUTPATIENT
Start: 2023-12-08

## 2023-12-07 RX ORDER — DIPHENHYDRAMINE HYDROCHLORIDE 50 MG/ML
25 INJECTION INTRAMUSCULAR; INTRAVENOUS ONCE
Status: CANCELLED | OUTPATIENT
Start: 2023-12-08 | End: 2023-12-08

## 2023-12-07 RX ORDER — FAMOTIDINE 10 MG/ML
20 INJECTION INTRAVENOUS ONCE AS NEEDED
Status: CANCELLED | OUTPATIENT
Start: 2023-12-08

## 2023-12-07 RX ORDER — METOCLOPRAMIDE HYDROCHLORIDE 5 MG/ML
10 INJECTION INTRAMUSCULAR; INTRAVENOUS ONCE
Status: CANCELLED | OUTPATIENT
Start: 2023-12-08 | End: 2023-12-08

## 2023-12-07 RX ORDER — NITROGLYCERIN 0.4 MG/1
0.4 TABLET SUBLINGUAL ONCE
Status: CANCELLED | OUTPATIENT
Start: 2023-12-08 | End: 2023-12-08

## 2023-12-07 RX ORDER — METOPROLOL TARTRATE 25 MG/1
25 TABLET, FILM COATED ORAL ONCE
Status: CANCELLED | OUTPATIENT
Start: 2023-12-08 | End: 2023-12-08

## 2023-12-08 ENCOUNTER — INFUSION (OUTPATIENT)
Dept: INFUSION THERAPY | Facility: CLINIC | Age: 63
End: 2023-12-08
Payer: COMMERCIAL

## 2023-12-08 VITALS
OXYGEN SATURATION: 96 % | TEMPERATURE: 97.2 F | SYSTOLIC BLOOD PRESSURE: 132 MMHG | HEART RATE: 75 BPM | DIASTOLIC BLOOD PRESSURE: 65 MMHG | RESPIRATION RATE: 13 BRPM

## 2023-12-08 DIAGNOSIS — R29.818 NEUROGENIC CLAUDICATION: ICD-10-CM

## 2023-12-08 DIAGNOSIS — M54.16 LUMBAR RADICULAR PAIN: Primary | ICD-10-CM

## 2023-12-08 DIAGNOSIS — U09.9 POST COVID-19 CONDITION, UNSPECIFIED: ICD-10-CM

## 2023-12-08 PROCEDURE — 96365 THER/PROPH/DIAG IV INF INIT: CPT | Mod: INF

## 2023-12-08 PROCEDURE — 2500000005 HC RX 250 GENERAL PHARMACY W/O HCPCS: Performed by: NURSE PRACTITIONER

## 2023-12-08 PROCEDURE — 96368 THER/DIAG CONCURRENT INF: CPT | Mod: INF

## 2023-12-08 PROCEDURE — 96375 TX/PRO/DX INJ NEW DRUG ADDON: CPT | Mod: INF

## 2023-12-08 PROCEDURE — 2500000004 HC RX 250 GENERAL PHARMACY W/ HCPCS (ALT 636 FOR OP/ED): Performed by: NURSE PRACTITIONER

## 2023-12-08 PROCEDURE — 2500000004 HC RX 250 GENERAL PHARMACY W/ HCPCS (ALT 636 FOR OP/ED)

## 2023-12-08 RX ORDER — DIPHENHYDRAMINE HYDROCHLORIDE 50 MG/ML
25 INJECTION INTRAMUSCULAR; INTRAVENOUS ONCE
Status: CANCELLED | OUTPATIENT
Start: 2023-12-22 | End: 2023-12-22

## 2023-12-08 RX ORDER — DIPHENHYDRAMINE HYDROCHLORIDE 50 MG/ML
50 INJECTION INTRAMUSCULAR; INTRAVENOUS AS NEEDED
Status: CANCELLED | OUTPATIENT
Start: 2023-12-22

## 2023-12-08 RX ORDER — ALBUTEROL SULFATE 0.83 MG/ML
3 SOLUTION RESPIRATORY (INHALATION) AS NEEDED
Status: CANCELLED | OUTPATIENT
Start: 2023-12-22

## 2023-12-08 RX ORDER — METOPROLOL TARTRATE 25 MG/1
25 TABLET, FILM COATED ORAL ONCE
Status: CANCELLED | OUTPATIENT
Start: 2023-12-22 | End: 2023-12-22

## 2023-12-08 RX ORDER — KETOROLAC TROMETHAMINE 30 MG/ML
30 INJECTION, SOLUTION INTRAMUSCULAR; INTRAVENOUS ONCE
Status: CANCELLED | OUTPATIENT
Start: 2023-12-22 | End: 2023-12-22

## 2023-12-08 RX ORDER — KETOROLAC TROMETHAMINE 30 MG/ML
30 INJECTION, SOLUTION INTRAMUSCULAR; INTRAVENOUS ONCE
Status: COMPLETED | OUTPATIENT
Start: 2023-12-08 | End: 2023-12-08

## 2023-12-08 RX ORDER — KETOROLAC TROMETHAMINE 30 MG/ML
INJECTION, SOLUTION INTRAMUSCULAR; INTRAVENOUS
Status: COMPLETED
Start: 2023-12-08 | End: 2023-12-08

## 2023-12-08 RX ORDER — EPINEPHRINE 0.3 MG/.3ML
0.3 INJECTION SUBCUTANEOUS EVERY 5 MIN PRN
Status: CANCELLED | OUTPATIENT
Start: 2023-12-22

## 2023-12-08 RX ORDER — NITROGLYCERIN 0.4 MG/1
0.4 TABLET SUBLINGUAL ONCE
Status: CANCELLED | OUTPATIENT
Start: 2023-12-22 | End: 2023-12-22

## 2023-12-08 RX ORDER — METOCLOPRAMIDE HYDROCHLORIDE 5 MG/ML
10 INJECTION INTRAMUSCULAR; INTRAVENOUS ONCE
Status: CANCELLED | OUTPATIENT
Start: 2023-12-22 | End: 2023-12-22

## 2023-12-08 RX ORDER — ONDANSETRON HYDROCHLORIDE 2 MG/ML
4 INJECTION, SOLUTION INTRAVENOUS ONCE
Status: CANCELLED | OUTPATIENT
Start: 2023-12-22 | End: 2023-12-22

## 2023-12-08 RX ORDER — FAMOTIDINE 10 MG/ML
20 INJECTION INTRAVENOUS ONCE AS NEEDED
Status: CANCELLED | OUTPATIENT
Start: 2023-12-22

## 2023-12-08 RX ADMIN — Medication: at 13:14

## 2023-12-08 RX ADMIN — KETOROLAC TROMETHAMINE 30 MG: 30 INJECTION, SOLUTION INTRAMUSCULAR at 13:14

## 2023-12-08 RX ADMIN — PROPOFOL 100 MG: 10 INJECTION, EMULSION INTRAVENOUS at 13:14

## 2023-12-08 RX ADMIN — KETOROLAC TROMETHAMINE 30 MG: 30 INJECTION, SOLUTION INTRAMUSCULAR; INTRAVENOUS at 13:14

## 2023-12-08 ASSESSMENT — PAIN SCALES - GENERAL
PAINLEVEL_OUTOF10: 2
PAINLEVEL: 2
PAINLEVEL_OUTOF10: 0 - NO PAIN
PAINLEVEL_OUTOF10: 2
PAINLEVEL_OUTOF10: 0 - NO PAIN
PAINLEVEL_OUTOF10: 0 - NO PAIN

## 2023-12-08 ASSESSMENT — ENCOUNTER SYMPTOMS
OCCASIONAL FEELINGS OF UNSTEADINESS: 0
DEPRESSION: 0
LOSS OF SENSATION IN FEET: 0

## 2023-12-08 ASSESSMENT — PAIN - FUNCTIONAL ASSESSMENT: PAIN_FUNCTIONAL_ASSESSMENT: 0-10

## 2023-12-08 NOTE — PROGRESS NOTES
Pt is here for her 9th pain infusion.  Pt currently has 2/10 pain in her R  knee radiating down to her toes.  Pt reports 60% pain relief after previous infusion that lasted 3 weeks. Pt's  is her .  Pt had fluids throughout the am and a light breakfast.

## 2023-12-14 DIAGNOSIS — I73.9 PAD (PERIPHERAL ARTERY DISEASE) (CMS-HCC): ICD-10-CM

## 2023-12-14 DIAGNOSIS — M54.16 LUMBAR RADICULAR PAIN: ICD-10-CM

## 2023-12-14 RX ORDER — HYDROCODONE BITARTRATE AND ACETAMINOPHEN 5; 325 MG/1; MG/1
2 TABLET ORAL 2 TIMES DAILY PRN
Qty: 28 TABLET | Refills: 0 | Status: SHIPPED | OUTPATIENT
Start: 2023-12-16 | End: 2023-12-18 | Stop reason: SDUPTHER

## 2023-12-18 ENCOUNTER — OFFICE VISIT (OUTPATIENT)
Dept: PAIN MEDICINE | Facility: CLINIC | Age: 63
End: 2023-12-18
Payer: COMMERCIAL

## 2023-12-18 ENCOUNTER — ANTICOAGULATION - WARFARIN VISIT (OUTPATIENT)
Dept: CARDIOLOGY | Facility: CLINIC | Age: 63
End: 2023-12-18
Payer: COMMERCIAL

## 2023-12-18 VITALS
DIASTOLIC BLOOD PRESSURE: 76 MMHG | TEMPERATURE: 98.2 F | HEIGHT: 64 IN | SYSTOLIC BLOOD PRESSURE: 122 MMHG | WEIGHT: 198 LBS | BODY MASS INDEX: 33.8 KG/M2 | HEART RATE: 80 BPM | RESPIRATION RATE: 18 BRPM

## 2023-12-18 DIAGNOSIS — M54.16 LUMBAR RADICULAR PAIN: ICD-10-CM

## 2023-12-18 DIAGNOSIS — M25.512 CHRONIC LEFT SHOULDER PAIN: Primary | ICD-10-CM

## 2023-12-18 DIAGNOSIS — M79.2 NEUROPATHIC PAIN: ICD-10-CM

## 2023-12-18 DIAGNOSIS — G89.29 CHRONIC LEFT SHOULDER PAIN: Primary | ICD-10-CM

## 2023-12-18 DIAGNOSIS — I74.9 ARTERIAL EMBOLISM (MULTI): Primary | ICD-10-CM

## 2023-12-18 DIAGNOSIS — I73.9 PAD (PERIPHERAL ARTERY DISEASE) (CMS-HCC): ICD-10-CM

## 2023-12-18 LAB
POC INR: 2.8
POC PROTHROMBIN TIME: NORMAL

## 2023-12-18 PROCEDURE — 99211 OFF/OP EST MAY X REQ PHY/QHP: CPT | Performed by: INTERNAL MEDICINE

## 2023-12-18 PROCEDURE — 85610 PROTHROMBIN TIME: CPT | Mod: QW

## 2023-12-18 PROCEDURE — 3078F DIAST BP <80 MM HG: CPT | Performed by: ANESTHESIOLOGY

## 2023-12-18 PROCEDURE — 99213 OFFICE O/P EST LOW 20 MIN: CPT | Performed by: ANESTHESIOLOGY

## 2023-12-18 PROCEDURE — 3074F SYST BP LT 130 MM HG: CPT | Performed by: ANESTHESIOLOGY

## 2023-12-18 PROCEDURE — 1036F TOBACCO NON-USER: CPT | Performed by: ANESTHESIOLOGY

## 2023-12-18 PROCEDURE — 3008F BODY MASS INDEX DOCD: CPT | Performed by: ANESTHESIOLOGY

## 2023-12-18 PROCEDURE — 99213 OFFICE O/P EST LOW 20 MIN: CPT | Mod: 27 | Performed by: ANESTHESIOLOGY

## 2023-12-18 RX ORDER — HYDROCODONE BITARTRATE AND ACETAMINOPHEN 5; 325 MG/1; MG/1
2 TABLET ORAL 2 TIMES DAILY PRN
Qty: 120 TABLET | Refills: 0 | Status: SHIPPED | OUTPATIENT
Start: 2023-12-24 | End: 2024-01-16 | Stop reason: SDUPTHER

## 2023-12-18 RX ORDER — NALOXONE HYDROCHLORIDE 4 MG/.1ML
4 SPRAY NASAL AS NEEDED
Qty: 2 EACH | Refills: 0 | Status: SHIPPED | OUTPATIENT
Start: 2023-12-18 | End: 2023-12-19

## 2023-12-18 ASSESSMENT — COLUMBIA-SUICIDE SEVERITY RATING SCALE - C-SSRS
1. IN THE PAST MONTH, HAVE YOU WISHED YOU WERE DEAD OR WISHED YOU COULD GO TO SLEEP AND NOT WAKE UP?: NO
2. HAVE YOU ACTUALLY HAD ANY THOUGHTS OF KILLING YOURSELF?: NO
6. HAVE YOU EVER DONE ANYTHING, STARTED TO DO ANYTHING, OR PREPARED TO DO ANYTHING TO END YOUR LIFE?: NO

## 2023-12-18 ASSESSMENT — PAIN SCALES - GENERAL
PAINLEVEL_OUTOF10: 2
PAINLEVEL: 2

## 2023-12-18 ASSESSMENT — PAIN DESCRIPTION - DESCRIPTORS: DESCRIPTORS: DISCOMFORT;NAGGING

## 2023-12-18 ASSESSMENT — ENCOUNTER SYMPTOMS
LOSS OF SENSATION IN FEET: 0
DEPRESSION: 0
SHORTNESS OF BREATH: 0
CONSTIPATION: 1
OCCASIONAL FEELINGS OF UNSTEADINESS: 0

## 2023-12-18 ASSESSMENT — PAIN - FUNCTIONAL ASSESSMENT: PAIN_FUNCTIONAL_ASSESSMENT: 0-10

## 2023-12-18 NOTE — PROGRESS NOTES
Chief Complain  Follow-up (For pain in right knee down. Currently taking gabapentin, and hydrocodone, and getting monthly infusions and getting a great deal of relief.)       History Of Present Illness  Angle Martins is a 63 y.o. female here for  right lower extremity pain and left shoulder pain . The patient rates the pain at 2  on a scale from 0-10.  The patient describes pain as discomfort and nagging.  The pain is worsened by standing and walking and is alleviated by medications opioid analgesics.  Since the last visit the pain has stayed the same.  The patient denies any fever, chills, weight loss, bladder/bowel incontinence.       Past Medical History  She has a past medical history of COVID-19 and Personal history of other venous thrombosis and embolism.    Surgical History  She has a past surgical history that includes Other surgical history (07/08/2022); Other surgical history (07/08/2022); Other surgical history (07/08/2022); CT angio aorta and bilateral iliofemoral runoff w and or wo IV contrast (12/8/2022); and CT angio aorta and bilateral iliofemoral runoff w and or wo IV contrast (12/13/2022).     Social History  She reports that she has never smoked. She has never used smokeless tobacco. No history on file for alcohol use and drug use.    Family History  No family history on file.     Allergies  Promethazine and Phenothiazines    Review of Systems  Review of Systems   Respiratory:  Negative for shortness of breath.    Cardiovascular:  Negative for chest pain.   Gastrointestinal:  Positive for constipation.   Psychiatric/Behavioral:  Negative for suicidal ideas.         Physical Exam  Physical Exam  HENT:      Head: Normocephalic.   Eyes:      Extraocular Movements: Extraocular movements intact.   Pulmonary:      Effort: Pulmonary effort is normal.   Neurological:      Mental Status: She is alert and oriented to person, place, and time.   Psychiatric:         Mood and Affect: Mood normal.          "  Last Recorded Vitals  Blood pressure 122/76, pulse 80, temperature 36.8 °C (98.2 °F), resp. rate 18, height 1.626 m (5' 4\"), weight 89.8 kg (198 lb).       Assessment/Plan     Angle Martins is a 63 y.o. female here for follow-up of right-sided lower extremity pain.  She has been experiencing the symptoms since last year after a thrombosis of femoral/popliteal arteries, she is status post thrombectomy.  The surgery was complicated by compartment syndrome.  She continues to have neuropathic pain, currently managing her pain with combination of hydrocodone, ketamine infusions and gabapentin which does provide her with 60 to 70% pain relief.  She has also been experiencing more pain on the left shoulder if she has been diagnosed with osteoarthritis and labral tear, she is not considered a candidate surgical due to her anticoagulation with Coumadin.  Would recommend trial of intra-articular corticosteroid injection.  Refill for hydrocodone was provided.  I have personally reviewed the OARRS report.  I have considered the risks of abuse, dependence, addiction and diversion.    Jose Armando Velazquez MD  "

## 2023-12-18 NOTE — PROGRESS NOTES
Patient identification verified with 2 identifiers.    Location: [facility] - [address] [place] [id number] [phone]     Referring Physician: Dr. Stephan Martins  Enrollment/ Re-enrollment date: 2024   INR Goal: 2.0-3.0  INR monitoring is per [facility] protocol.  Anticoagulation Medication: warfarin  Indication: Arterial Embolism    Subjective   Bleeding signs/symptoms: No  Bruising: No   Major bleeding event: No  Thrombosis signs/symptoms: No  Thromboembolic event: No  Missed doses: No  Extra doses: No  Medication changes: No  Dietary changes: No  Change in health: No  Change in activity: No  Alcohol: No  Other concerns: No    Upcoming Surgeries:  Does the Patient Have any upcoming surgeries that require interruption in anticoagulation therapy? no  Does the patient require bridging? no      Anticoagulation Summary  As of 2023      INR goal:  2.0-3.0   TTR:  85.8 % (2.5 mo)   INR used for dosin.80 (2023)   Weekly warfarin total:  27 mg               Assessment/Plan   Therapeutic     1. New dose: no change    2. Next INR: 1 month      Education provided to patient during the visit:  Patient instructed to call in interim with questions, concerns and changes.   Patient educated on interactions between medications and warfarin.   Patient educated on dietary consistency in vitamin k consumption.   Patient educated on affects of alcohol consumption while taking warfarin.   Patient educated on signs of bleeding/clotting.   Patient educated on compliance with dosing, follow up appointments, and prescribed plan of care.

## 2023-12-19 DIAGNOSIS — F33.41 RECURRENT MAJOR DEPRESSION IN PARTIAL REMISSION (CMS-HCC): Primary | ICD-10-CM

## 2023-12-19 RX ORDER — BUPROPION HYDROCHLORIDE 300 MG/1
TABLET ORAL
Qty: 90 TABLET | Refills: 0 | Status: SHIPPED | OUTPATIENT
Start: 2023-12-19 | End: 2024-01-31 | Stop reason: SDUPTHER

## 2023-12-29 ENCOUNTER — APPOINTMENT (OUTPATIENT)
Dept: PRIMARY CARE | Facility: CLINIC | Age: 63
End: 2023-12-29
Payer: COMMERCIAL

## 2024-01-02 PROBLEM — I82.411 ACUTE EMBOLISM AND THROMBOSIS OF RIGHT FEMORAL VEIN (MULTI): Status: ACTIVE | Noted: 2022-12-08

## 2024-01-02 PROBLEM — Z86.718 HISTORY OF DEEP VEIN THROMBOSIS: Status: ACTIVE | Noted: 2020-02-13

## 2024-01-02 PROBLEM — I97.638: Status: ACTIVE | Noted: 2022-12-08

## 2024-01-02 PROBLEM — C44.92 SCC (SQUAMOUS CELL CARCINOMA): Status: ACTIVE | Noted: 2024-01-02

## 2024-01-02 PROBLEM — G89.29 OTHER CHRONIC PAIN: Status: ACTIVE | Noted: 2022-01-07

## 2024-01-02 PROBLEM — F41.8 DEPRESSION WITH ANXIETY: Status: ACTIVE | Noted: 2021-07-14

## 2024-01-02 PROBLEM — M54.30 SCIATICA: Status: ACTIVE | Noted: 2022-12-08

## 2024-01-02 PROBLEM — Z87.891 PERSONAL HISTORY OF NICOTINE DEPENDENCE: Status: ACTIVE | Noted: 2018-05-22

## 2024-01-02 PROBLEM — D52.9 FOLATE-DEFICIENCY ANEMIA: Status: ACTIVE | Noted: 2023-02-25

## 2024-01-02 PROBLEM — D68.59 OTHER PRIMARY THROMBOPHILIA (MULTI): Status: ACTIVE | Noted: 2022-12-08

## 2024-01-02 PROBLEM — E66.01 MORBID OBESITY (MULTI): Status: ACTIVE | Noted: 2018-10-24

## 2024-01-02 PROBLEM — K21.9 GASTRO-ESOPHAGEAL REFLUX DISEASE WITHOUT ESOPHAGITIS: Status: ACTIVE | Noted: 2022-12-08

## 2024-01-02 PROBLEM — R05.3 POST-COVID CHRONIC COUGH: Status: ACTIVE | Noted: 2021-10-05

## 2024-01-02 PROBLEM — E78.5 HYPERLIPIDEMIA, UNSPECIFIED HYPERLIPIDEMIA TYPE: Status: ACTIVE | Noted: 2018-05-22

## 2024-01-02 PROBLEM — I99.8 OTHER DISORDER OF CIRCULATORY SYSTEM: Status: ACTIVE | Noted: 2022-12-08

## 2024-01-02 PROBLEM — U09.9 POST-COVID CHRONIC COUGH: Status: ACTIVE | Noted: 2021-10-05

## 2024-01-02 PROBLEM — Z79.82 LONG TERM (CURRENT) USE OF ASPIRIN: Status: ACTIVE | Noted: 2022-12-08

## 2024-01-02 PROBLEM — U07.1 COVID-19 VIRUS INFECTION: Status: ACTIVE | Noted: 2020-12-26

## 2024-01-02 PROBLEM — F41.9 ANXIETY DISORDER, UNSPECIFIED: Status: ACTIVE | Noted: 2022-01-07

## 2024-01-02 PROBLEM — I67.82 CEREBRAL ISCHEMIA: Status: ACTIVE | Noted: 2019-08-20

## 2024-01-02 PROBLEM — M25.512 PAIN OF LEFT SHOULDER REGION: Status: ACTIVE | Noted: 2021-11-24

## 2024-01-03 ENCOUNTER — ANCILLARY PROCEDURE (OUTPATIENT)
Dept: RADIOLOGY | Facility: CLINIC | Age: 64
End: 2024-01-03
Payer: COMMERCIAL

## 2024-01-03 ENCOUNTER — HOSPITAL ENCOUNTER (OUTPATIENT)
Dept: PAIN MEDICINE | Facility: CLINIC | Age: 64
Discharge: HOME | End: 2024-01-03
Payer: COMMERCIAL

## 2024-01-03 VITALS
WEIGHT: 198 LBS | OXYGEN SATURATION: 99 % | DIASTOLIC BLOOD PRESSURE: 63 MMHG | TEMPERATURE: 97.5 F | RESPIRATION RATE: 18 BRPM | SYSTOLIC BLOOD PRESSURE: 122 MMHG | HEIGHT: 64 IN | HEART RATE: 61 BPM | BODY MASS INDEX: 33.8 KG/M2

## 2024-01-03 DIAGNOSIS — G89.29 CHRONIC LEFT SHOULDER PAIN: ICD-10-CM

## 2024-01-03 DIAGNOSIS — M25.512 CHRONIC LEFT SHOULDER PAIN: ICD-10-CM

## 2024-01-03 PROCEDURE — 2500000005 HC RX 250 GENERAL PHARMACY W/O HCPCS

## 2024-01-03 PROCEDURE — 77002 NEEDLE LOCALIZATION BY XRAY: CPT

## 2024-01-03 PROCEDURE — 2500000004 HC RX 250 GENERAL PHARMACY W/ HCPCS (ALT 636 FOR OP/ED)

## 2024-01-03 PROCEDURE — 20611 DRAIN/INJ JOINT/BURSA W/US: CPT | Performed by: ANESTHESIOLOGY

## 2024-01-03 PROCEDURE — 20610 DRAIN/INJ JOINT/BURSA W/O US: CPT

## 2024-01-03 PROCEDURE — 77003 FLUOROGUIDE FOR SPINE INJECT: CPT

## 2024-01-03 RX ORDER — LIDOCAINE HYDROCHLORIDE 10 MG/ML
INJECTION, SOLUTION EPIDURAL; INFILTRATION; INTRACAUDAL; PERINEURAL
Status: COMPLETED
Start: 2024-01-03 | End: 2024-01-03

## 2024-01-03 RX ORDER — ROPIVACAINE HYDROCHLORIDE 5 MG/ML
INJECTION, SOLUTION EPIDURAL; INFILTRATION; PERINEURAL
Status: COMPLETED
Start: 2024-01-03 | End: 2024-01-03

## 2024-01-03 RX ORDER — TRIAMCINOLONE ACETONIDE 40 MG/ML
INJECTION, SUSPENSION INTRA-ARTICULAR; INTRAMUSCULAR
Status: COMPLETED
Start: 2024-01-03 | End: 2024-01-03

## 2024-01-03 RX ADMIN — TRIAMCINOLONE ACETONIDE 40 MG: 40 INJECTION, SUSPENSION INTRA-ARTICULAR; INTRAMUSCULAR at 13:38

## 2024-01-03 RX ADMIN — ROPIVACAINE HYDROCHLORIDE 100 MG: 5 INJECTION, SOLUTION EPIDURAL; INFILTRATION; PERINEURAL at 13:38

## 2024-01-03 RX ADMIN — LIDOCAINE HYDROCHLORIDE 50 MG: 10 INJECTION, SOLUTION EPIDURAL; INFILTRATION; INTRACAUDAL; PERINEURAL at 13:39

## 2024-01-03 ASSESSMENT — COLUMBIA-SUICIDE SEVERITY RATING SCALE - C-SSRS
2. HAVE YOU ACTUALLY HAD ANY THOUGHTS OF KILLING YOURSELF?: NO
6. HAVE YOU EVER DONE ANYTHING, STARTED TO DO ANYTHING, OR PREPARED TO DO ANYTHING TO END YOUR LIFE?: NO
1. IN THE PAST MONTH, HAVE YOU WISHED YOU WERE DEAD OR WISHED YOU COULD GO TO SLEEP AND NOT WAKE UP?: NO

## 2024-01-03 ASSESSMENT — PAIN - FUNCTIONAL ASSESSMENT: PAIN_FUNCTIONAL_ASSESSMENT: 0-10

## 2024-01-03 ASSESSMENT — PAIN SCALES - GENERAL: PAINLEVEL_OUTOF10: 0 - NO PAIN

## 2024-01-03 NOTE — OP NOTE
Procedure Note     Date: 1/3/2024  OR Location: [facility] NON-OR PROCEDURES    Name: Angle Martins, : 1960, Age: 63 y.o., MRN: [id number], Sex: female    Diagnosis  Preprocedure diagnosis: Left shoulder pain  Postprocedure diagnosis: Same    Procedures  Left shoulder intra-articular injection    The patient was seen in the preoperative area. The risks, benefits, complications, treatment options, non-operative alternatives, expected recovery and outcomes were discussed with the patient. The patient concurred with the proposed plan, giving informed consent.        Procedure: Patient was positioned in supine position.  The area overlying the left shoulder was cleaned with ChloraPrep and draped using standard sterile precautions.  Under fluoroscopic guidance head of left humerus and glenohumeral joint were identified.  Area overlying upper medial quadrant was anesthetized with 1% lidocaine.  Then 1-1/2 inch 25-gauge needle was advanced under direct fluoroscopic guidance directed towards upper medial quadrant lateral to the humeral labrum, once the contact with os was made.  1 mL of Omnipaque was injected demonstrating the spread of the contrast in the joint space.  Then after negative aspiration a solution containing 40 mg of Kenalog and 3 mL of 0.5% ropivacaine was injected.  Patient tolerated the procedure without any obvious complications.        Complications:  None; patient tolerated the procedure well.    Disposition: Home  Condition: stable         Additional Details: NA    Attending Attestation: I performed the procedure.    Jose Armando Velazquez MD     n/a

## 2024-01-03 NOTE — Clinical Note
Prepped with ChloraPrep, a minimum of 3 minute dry time, longer if needed, no pooling noted, patient draped in sterile fashion. Left shoulder injection

## 2024-01-15 ENCOUNTER — ANTICOAGULATION - WARFARIN VISIT (OUTPATIENT)
Dept: CARDIOLOGY | Facility: CLINIC | Age: 64
End: 2024-01-15
Payer: COMMERCIAL

## 2024-01-15 DIAGNOSIS — I74.9 ARTERIAL EMBOLISM (MULTI): Primary | ICD-10-CM

## 2024-01-15 LAB
POC INR: 4.5 (ref 2–3)
POC PROTHROMBIN TIME: ABNORMAL

## 2024-01-15 PROCEDURE — 99211 OFF/OP EST MAY X REQ PHY/QHP: CPT | Performed by: INTERNAL MEDICINE

## 2024-01-15 PROCEDURE — 85610 PROTHROMBIN TIME: CPT | Mod: QW | Performed by: INTERNAL MEDICINE

## 2024-01-15 NOTE — PROGRESS NOTES
Patient identification verified with 2 identifiers.    Location: [facility] - [address] [place] [id number] [phone]     Referring Physician: Dr. Stephan Martins  Enrollment/ Re-enrollment date: 2024   INR Goal: 2.0-3.0  INR monitoring is per [facility] protocol.  Anticoagulation Medication: warfarin  Indication: Arterial Embolism    Subjective   Bleeding signs/symptoms: No  Bruising: No   Major bleeding event: No  Thrombosis signs/symptoms: No  Thromboembolic event: No  Missed doses: No  Extra doses: Yes.  Pt states that she took an extra 3mg this past week on accident.  She could not remember if she took that days dose or not so she took an additional 3mg.  Medication changes: No  Dietary changes: No  Change in health: No  Change in activity: No  Alcohol: No  Other concerns: No    Upcoming Surgeries:  Does the Patient Have any upcoming surgeries that require interruption in anticoagulation therapy? no  Does the patient require bridging? no    Dose maintained after last visit.  Pt is taking 27mg of warfarin weekly.  Anticoagulation Summary  As of 1/15/2024      INR goal:  2.0-3.0   TTR:  65.5 % (3.4 mo)   INR used for dosin.50 (1/15/2024)   Weekly warfarin total:  27 mg               Assessment/Plan   Supratherapeutic     1. New dose: no change.  Maintain current dosage but will have Pt hold Two doses of warfarin on 1/15 & 24.  27mg weekly.  2. Next INR: 1 week.  Pt r/s to RTC in Two weeks instead of 1 week recommended return date due to Pt availability of transportation.  Can r/s in 4 weeks if therapeutic.      Education provided to patient during the visit:  Patient instructed to call in interim with questions, concerns and changes.   Patient educated on interactions between medications and warfarin.   Patient educated on dietary consistency in vitamin k consumption.   Patient educated on affects of alcohol consumption while taking warfarin.   Patient educated on  signs of bleeding/clotting.   Patient educated on compliance with dosing, follow up appointments, and prescribed plan of care.

## 2024-01-16 ENCOUNTER — INFUSION (OUTPATIENT)
Dept: INFUSION THERAPY | Facility: CLINIC | Age: 64
End: 2024-01-16
Payer: COMMERCIAL

## 2024-01-16 VITALS
OXYGEN SATURATION: 96 % | DIASTOLIC BLOOD PRESSURE: 74 MMHG | SYSTOLIC BLOOD PRESSURE: 145 MMHG | RESPIRATION RATE: 10 BRPM | TEMPERATURE: 97.5 F | HEART RATE: 69 BPM

## 2024-01-16 DIAGNOSIS — R05.3 POST-COVID CHRONIC COUGH: ICD-10-CM

## 2024-01-16 DIAGNOSIS — M54.16 LUMBAR RADICULAR PAIN: ICD-10-CM

## 2024-01-16 DIAGNOSIS — R29.818 NEUROGENIC CLAUDICATION: ICD-10-CM

## 2024-01-16 DIAGNOSIS — I73.9 PAD (PERIPHERAL ARTERY DISEASE) (CMS-HCC): ICD-10-CM

## 2024-01-16 DIAGNOSIS — U09.9 POST-COVID CHRONIC COUGH: ICD-10-CM

## 2024-01-16 DIAGNOSIS — M54.16 LUMBAR RADICULAR PAIN: Primary | ICD-10-CM

## 2024-01-16 PROCEDURE — 96368 THER/DIAG CONCURRENT INF: CPT | Mod: INF

## 2024-01-16 PROCEDURE — 96375 TX/PRO/DX INJ NEW DRUG ADDON: CPT | Mod: INF

## 2024-01-16 PROCEDURE — 96365 THER/PROPH/DIAG IV INF INIT: CPT | Mod: INF

## 2024-01-16 PROCEDURE — 2500000004 HC RX 250 GENERAL PHARMACY W/ HCPCS (ALT 636 FOR OP/ED): Performed by: NURSE PRACTITIONER

## 2024-01-16 PROCEDURE — 2500000004 HC RX 250 GENERAL PHARMACY W/ HCPCS (ALT 636 FOR OP/ED)

## 2024-01-16 PROCEDURE — 2500000005 HC RX 250 GENERAL PHARMACY W/O HCPCS: Performed by: NURSE PRACTITIONER

## 2024-01-16 RX ORDER — DIPHENHYDRAMINE HYDROCHLORIDE 50 MG/ML
25 INJECTION INTRAMUSCULAR; INTRAVENOUS ONCE
Status: CANCELLED | OUTPATIENT
Start: 2024-01-19 | End: 2024-01-19

## 2024-01-16 RX ORDER — KETOROLAC TROMETHAMINE 30 MG/ML
30 INJECTION, SOLUTION INTRAMUSCULAR; INTRAVENOUS ONCE
Status: COMPLETED | OUTPATIENT
Start: 2024-01-16 | End: 2024-01-16

## 2024-01-16 RX ORDER — METOPROLOL TARTRATE 25 MG/1
25 TABLET, FILM COATED ORAL ONCE
Status: CANCELLED | OUTPATIENT
Start: 2024-01-19 | End: 2024-01-19

## 2024-01-16 RX ORDER — FAMOTIDINE 10 MG/ML
20 INJECTION INTRAVENOUS ONCE AS NEEDED
Status: CANCELLED | OUTPATIENT
Start: 2024-01-19

## 2024-01-16 RX ORDER — KETOROLAC TROMETHAMINE 30 MG/ML
INJECTION, SOLUTION INTRAMUSCULAR; INTRAVENOUS
Status: COMPLETED
Start: 2024-01-16 | End: 2024-01-16

## 2024-01-16 RX ORDER — KETOROLAC TROMETHAMINE 30 MG/ML
30 INJECTION, SOLUTION INTRAMUSCULAR; INTRAVENOUS ONCE
Status: CANCELLED | OUTPATIENT
Start: 2024-01-19 | End: 2024-01-19

## 2024-01-16 RX ORDER — EPINEPHRINE 0.3 MG/.3ML
0.3 INJECTION SUBCUTANEOUS EVERY 5 MIN PRN
Status: CANCELLED | OUTPATIENT
Start: 2024-01-19

## 2024-01-16 RX ORDER — HYDROCODONE BITARTRATE AND ACETAMINOPHEN 5; 325 MG/1; MG/1
2 TABLET ORAL 2 TIMES DAILY PRN
Qty: 120 TABLET | Refills: 0 | Status: SHIPPED | OUTPATIENT
Start: 2024-01-23 | End: 2024-02-21 | Stop reason: SDUPTHER

## 2024-01-16 RX ORDER — NITROGLYCERIN 0.4 MG/1
0.4 TABLET SUBLINGUAL ONCE
Status: CANCELLED | OUTPATIENT
Start: 2024-01-19 | End: 2024-01-19

## 2024-01-16 RX ORDER — DIPHENHYDRAMINE HYDROCHLORIDE 50 MG/ML
50 INJECTION INTRAMUSCULAR; INTRAVENOUS AS NEEDED
Status: CANCELLED | OUTPATIENT
Start: 2024-01-19

## 2024-01-16 RX ORDER — ONDANSETRON HYDROCHLORIDE 2 MG/ML
4 INJECTION, SOLUTION INTRAVENOUS ONCE
Status: CANCELLED | OUTPATIENT
Start: 2024-01-19 | End: 2024-01-19

## 2024-01-16 RX ORDER — ALBUTEROL SULFATE 0.83 MG/ML
3 SOLUTION RESPIRATORY (INHALATION) AS NEEDED
Status: CANCELLED | OUTPATIENT
Start: 2024-01-19

## 2024-01-16 RX ORDER — METOCLOPRAMIDE HYDROCHLORIDE 5 MG/ML
10 INJECTION INTRAMUSCULAR; INTRAVENOUS ONCE
Status: CANCELLED | OUTPATIENT
Start: 2024-01-19 | End: 2024-01-19

## 2024-01-16 RX ADMIN — Medication: at 13:58

## 2024-01-16 RX ADMIN — KETOROLAC TROMETHAMINE 30 MG: 30 INJECTION, SOLUTION INTRAMUSCULAR; INTRAVENOUS at 13:57

## 2024-01-16 RX ADMIN — PROPOFOL 100 MG: 10 INJECTION, EMULSION INTRAVENOUS at 13:58

## 2024-01-16 ASSESSMENT — COLUMBIA-SUICIDE SEVERITY RATING SCALE - C-SSRS
6. HAVE YOU EVER DONE ANYTHING, STARTED TO DO ANYTHING, OR PREPARED TO DO ANYTHING TO END YOUR LIFE?: NO
2. HAVE YOU ACTUALLY HAD ANY THOUGHTS OF KILLING YOURSELF?: NO
1. IN THE PAST MONTH, HAVE YOU WISHED YOU WERE DEAD OR WISHED YOU COULD GO TO SLEEP AND NOT WAKE UP?: NO

## 2024-01-16 ASSESSMENT — PAIN - FUNCTIONAL ASSESSMENT
PAIN_FUNCTIONAL_ASSESSMENT: 0-10

## 2024-01-16 ASSESSMENT — PATIENT HEALTH QUESTIONNAIRE - PHQ9
SUM OF ALL RESPONSES TO PHQ9 QUESTIONS 1 AND 2: 0
1. LITTLE INTEREST OR PLEASURE IN DOING THINGS: NOT AT ALL
2. FEELING DOWN, DEPRESSED OR HOPELESS: NOT AT ALL

## 2024-01-16 ASSESSMENT — PAIN SCALES - GENERAL
PAINLEVEL_OUTOF10: 0 - NO PAIN
PAINLEVEL_OUTOF10: 3
PAINLEVEL: 3

## 2024-01-16 ASSESSMENT — ENCOUNTER SYMPTOMS
OCCASIONAL FEELINGS OF UNSTEADINESS: 0
LOSS OF SENSATION IN FEET: 0
DEPRESSION: 0

## 2024-01-16 NOTE — PROGRESS NOTES
S: Patient here for 10 opioid sparing pain infusion. Patient reports 65% reduction in pain after last infusion that lasted 3 weeks.    Purpose of pain infusion meds explained along with potential side effects.  Patient verbalized understanding.    B: Pain Issues 3/10 left lower leg    A: Patient currently has pain described on flow sheet documentation. Designated  is . Patient last ate solid food 4 hours ago, and had liquid 2 hours ago.    R: Plan; Obtain IV access, do patient risk assessment, and start opioid sparing infusion as ordered. Monitoring for S/S of adverse reactions.

## 2024-01-23 DIAGNOSIS — I74.3 ARTERIAL EMBOLISM AND THROMBOSIS OF LOWER EXTREMITY (MULTI): ICD-10-CM

## 2024-01-23 DIAGNOSIS — Z86.718 HISTORY OF DVT (DEEP VEIN THROMBOSIS): ICD-10-CM

## 2024-01-24 RX ORDER — WARFARIN 3 MG/1
TABLET ORAL
Qty: 108 TABLET | Refills: 1 | Status: SHIPPED | OUTPATIENT
Start: 2024-01-24 | End: 2024-01-31 | Stop reason: SDUPTHER

## 2024-01-29 ENCOUNTER — ANTICOAGULATION - WARFARIN VISIT (OUTPATIENT)
Dept: CARDIOLOGY | Facility: CLINIC | Age: 64
End: 2024-01-29
Payer: COMMERCIAL

## 2024-01-29 DIAGNOSIS — I74.9 ARTERIAL EMBOLISM (MULTI): Primary | ICD-10-CM

## 2024-01-29 LAB
POC INR: 3
POC PROTHROMBIN TIME: NORMAL

## 2024-01-29 PROCEDURE — 99211 OFF/OP EST MAY X REQ PHY/QHP: CPT | Performed by: INTERNAL MEDICINE

## 2024-01-29 NOTE — PROGRESS NOTES
Patient identification verified with 2 identifiers.    Location: [facility] - [address] [place] [id number] [phone]     Referring Physician: Dr. Stephan Martins  Enrollment/ Re-enrollment date: 4/21/2024   INR Goal: 2.0-3.0  INR monitoring is per [facility] protocol.  Anticoagulation Medication: warfarin  Indication: Arterial Embolism    Subjective   Bleeding signs/symptoms: No    Bruising: No   Major bleeding event: No  Thrombosis signs/symptoms: No  Thromboembolic event: No  Missed doses: No  Extra doses: No  Medication changes: No  Dietary changes: No  Change in health: No  Change in activity: No  Alcohol: No  Other concerns: No    Upcoming Surgeries:  Does the Patient Have any upcoming surgeries that require interruption in anticoagulation therapy? no  Does the patient require bridging? no      Anticoagulation Summary  As of 1/29/2024      INR goal:  2.0-3.0   TTR:  57.6 % (3.9 mo)   INR used for dosing:  3.00 (1/29/2024)   Weekly warfarin total:  27 mg               Assessment/Plan   Therapeutic     1. New dose: no change    2. Next INR: 2 weeks      Education provided to patient during the visit:  Patient instructed to call in interim with questions, concerns and changes.   Patient educated on compliance with dosing, follow up appointments, and prescribed plan of care.

## 2024-01-31 ENCOUNTER — OFFICE VISIT (OUTPATIENT)
Dept: PRIMARY CARE | Facility: CLINIC | Age: 64
End: 2024-01-31
Payer: COMMERCIAL

## 2024-01-31 VITALS
OXYGEN SATURATION: 94 % | HEART RATE: 81 BPM | WEIGHT: 188 LBS | BODY MASS INDEX: 32.1 KG/M2 | SYSTOLIC BLOOD PRESSURE: 121 MMHG | DIASTOLIC BLOOD PRESSURE: 78 MMHG | HEIGHT: 64 IN

## 2024-01-31 DIAGNOSIS — F33.1 MAJOR DEPRESSIVE DISORDER, RECURRENT EPISODE, MODERATE DEGREE (MULTI): ICD-10-CM

## 2024-01-31 DIAGNOSIS — R31.29 MICROSCOPIC HEMATURIA: ICD-10-CM

## 2024-01-31 DIAGNOSIS — E66.09 CLASS 1 OBESITY DUE TO EXCESS CALORIES WITH SERIOUS COMORBIDITY AND BODY MASS INDEX (BMI) OF 32.0 TO 32.9 IN ADULT: ICD-10-CM

## 2024-01-31 DIAGNOSIS — D52.9 ANEMIA DUE TO FOLIC ACID DEFICIENCY, UNSPECIFIED DEFICIENCY TYPE: ICD-10-CM

## 2024-01-31 DIAGNOSIS — R53.81 DEBILITY: ICD-10-CM

## 2024-01-31 DIAGNOSIS — I74.3 ARTERIAL EMBOLISM AND THROMBOSIS OF LOWER EXTREMITY (MULTI): Primary | ICD-10-CM

## 2024-01-31 DIAGNOSIS — R79.89 AZOTEMIA: ICD-10-CM

## 2024-01-31 DIAGNOSIS — G89.4 CHRONIC PAIN SYNDROME: ICD-10-CM

## 2024-01-31 DIAGNOSIS — U07.1 COVID-19 VIRUS INFECTION: ICD-10-CM

## 2024-01-31 DIAGNOSIS — Z86.718 HISTORY OF DVT (DEEP VEIN THROMBOSIS): ICD-10-CM

## 2024-01-31 DIAGNOSIS — F33.41 RECURRENT MAJOR DEPRESSION IN PARTIAL REMISSION (CMS-HCC): ICD-10-CM

## 2024-01-31 DIAGNOSIS — E55.9 VITAMIN D DEFICIENCY: ICD-10-CM

## 2024-01-31 DIAGNOSIS — E78.2 MIXED HYPERLIPIDEMIA: ICD-10-CM

## 2024-01-31 DIAGNOSIS — F11.21 OPIOID DEPENDENCE IN REMISSION (MULTI): ICD-10-CM

## 2024-01-31 DIAGNOSIS — Z91.89 FRAMINGHAM CARDIAC RISK <10% IN NEXT 10 YEARS: ICD-10-CM

## 2024-01-31 DIAGNOSIS — M62.82 NON-TRAUMATIC RHABDOMYOLYSIS: ICD-10-CM

## 2024-01-31 DIAGNOSIS — I10 ESSENTIAL HYPERTENSION: ICD-10-CM

## 2024-01-31 DIAGNOSIS — E53.8 VITAMIN B12 DEFICIENCY: ICD-10-CM

## 2024-01-31 PROBLEM — D68.59 OTHER PRIMARY THROMBOPHILIA (MULTI): Status: RESOLVED | Noted: 2022-12-08 | Resolved: 2024-01-31

## 2024-01-31 PROBLEM — J45.909 REACTIVE AIRWAY DISEASE (HHS-HCC): Status: RESOLVED | Noted: 2020-03-21 | Resolved: 2024-01-31

## 2024-01-31 PROBLEM — E66.811 CLASS 1 OBESITY DUE TO EXCESS CALORIES WITH SERIOUS COMORBIDITY AND BODY MASS INDEX (BMI) OF 33.0 TO 33.9 IN ADULT: Status: ACTIVE | Noted: 2024-01-31

## 2024-01-31 LAB
APPEARANCE UR: CLEAR
BACTERIA #/AREA URNS AUTO: ABNORMAL /HPF
BILIRUB UR STRIP.AUTO-MCNC: NEGATIVE MG/DL
COLOR UR: YELLOW
GLUCOSE UR STRIP.AUTO-MCNC: NEGATIVE MG/DL
HYALINE CASTS #/AREA URNS AUTO: ABNORMAL /LPF
KETONES UR STRIP.AUTO-MCNC: ABNORMAL MG/DL
LEUKOCYTE ESTERASE UR QL STRIP.AUTO: ABNORMAL
MUCOUS THREADS #/AREA URNS AUTO: ABNORMAL /LPF
NITRITE UR QL STRIP.AUTO: NEGATIVE
PH UR STRIP.AUTO: 5 [PH]
PROT UR STRIP.AUTO-MCNC: NEGATIVE MG/DL
RBC # UR STRIP.AUTO: ABNORMAL /UL
RBC #/AREA URNS AUTO: ABNORMAL /HPF
SP GR UR STRIP.AUTO: 1.02
UROBILINOGEN UR STRIP.AUTO-MCNC: <2 MG/DL
WBC #/AREA URNS AUTO: ABNORMAL /HPF

## 2024-01-31 PROCEDURE — 99214 OFFICE O/P EST MOD 30 MIN: CPT | Performed by: INTERNAL MEDICINE

## 2024-01-31 PROCEDURE — 81001 URINALYSIS AUTO W/SCOPE: CPT

## 2024-01-31 PROCEDURE — 1036F TOBACCO NON-USER: CPT | Performed by: INTERNAL MEDICINE

## 2024-01-31 PROCEDURE — 3078F DIAST BP <80 MM HG: CPT | Performed by: INTERNAL MEDICINE

## 2024-01-31 PROCEDURE — 3008F BODY MASS INDEX DOCD: CPT | Performed by: INTERNAL MEDICINE

## 2024-01-31 PROCEDURE — 87086 URINE CULTURE/COLONY COUNT: CPT

## 2024-01-31 PROCEDURE — 3074F SYST BP LT 130 MM HG: CPT | Performed by: INTERNAL MEDICINE

## 2024-01-31 RX ORDER — FOLIC ACID 1 MG/1
TABLET ORAL
Qty: 90 TABLET | Refills: 3 | Status: SHIPPED | OUTPATIENT
Start: 2024-01-31

## 2024-01-31 RX ORDER — WARFARIN 3 MG/1
TABLET ORAL
Qty: 108 TABLET | Refills: 1 | Status: SHIPPED | OUTPATIENT
Start: 2024-01-31 | End: 2025-01-27

## 2024-01-31 RX ORDER — PNV NO.95/FERROUS FUM/FOLIC AC 28MG-0.8MG
TABLET ORAL
Qty: 90 TABLET | Refills: 3 | Status: SHIPPED | OUTPATIENT
Start: 2024-01-31

## 2024-01-31 RX ORDER — BUPROPION HYDROCHLORIDE 300 MG/1
TABLET ORAL
Qty: 90 TABLET | Refills: 0 | Status: SHIPPED | OUTPATIENT
Start: 2024-01-31

## 2024-01-31 NOTE — PROGRESS NOTES
"Subjective   Patient ID: Angle Martins is a 63 y.o. female who presents for Routine FU (Pt in today for routine FU).    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  Per Patient 1.23.2024  -     Follow Up In Primary Care - Established; Future  Essential hypertension  -     Urinalysis with Reflex Culture and Microscopic  -     Follow Up In Primary Care - Established; Future  Mixed hyperlipidemia  -     Follow Up In Primary Care - Established; Future  [place] cardiac risk <10% in next 10 years  Comments:  3.7% 01/24  Orders:  -     Follow Up In Primary Care - Established; Future  Debility  -     Urinalysis with Reflex Culture and Microscopic  -     Follow Up In Primary Care - Established; Future  Non-traumatic rhabdomyolysis  -     Urinalysis with Reflex Culture and Microscopic  -     Follow Up In Primary Care - Established; Future  COVID-19 virus infection  -     Follow Up In Primary Care - Established; Future  Anemia due to folic acid deficiency, unspecified deficiency type  -     Urinalysis with Reflex Culture and Microscopic  -     folic acid (Folvite) 1 mg tablet; Please take 1 tablet by mouth with lunch every day.  Thank you.  -     Follow Up In Primary Care - Established; Future  Microscopic hematuria  -     Follow Up In Primary Care - Established; Future  Major depressive disorder, recurrent episode, moderate degree (CMS/HCC)  -     buPROPion XL (Wellbutrin XL) 300 mg 24 hr tablet; Please take 1 tablet by mouth with BREAKFAST every morning.  Do not crush, chew, or " split.  -     Referral to Psychiatry; Future  -     Follow Up In Primary Care - [facility]; Future  Class 1 obesity due to excess calories with serious comorbidity and body mass index (BMI) of 32.0 to 32.9 in adult  -     Follow Up In Primary Care [facility]; Future  Opioid dependence in remission (CMS/HCC)  -     Follow Up In Primary Care - [facility]; Future  Chronic pain syndrome  -     Follow Up In Primary Care [facility]; Future  Azotemia  -     Urinalysis with Reflex Culture and Microscopic  -     Follow Up In Primary Care - [facility]; Future  Vitamin D deficiency  -     Follow Up In Primary Care - [facility]; Future  History of DVT (deep vein thrombosis)  -     warfarin (Coumadin) 3 mg tablet; Patient is taking 1 1/2 tablets 4 days a week 1 tablets 3 days a week.  Per Patient 1.23.2024  -     Follow Up In Primary Care - [facility]; Future  Recurrent major depression in partial remission (CMS/HCC)  Vitamin B12 deficiency  -     cyanocobalamin (Vitamin B-12) 100 mcg tablet; Please take 1 tablet by mouth with LUNCH every day.  Thank you.  -     Follow Up In Primary Care - [facility]; Future       Thank you very much for coming.  I am very happy to see you back on your feet!    I believe that you will benefit from seeing PSYCHIATRY anytime soon.  I do understand that it was hard to get into the practice of Dr. Carrera, but I believe that he is the one that can most help you, especially since he also knows your .  If not, there is also Dr. Marley and his team.  They are mainly at [facility] or [place].  Let us try Dr. Carrera and his group first.    URINE studies today.  I will send word regarding results and possible changes.  Please drink lots of fluids throughout the day.  Please urinate often while awake.    Please continue to follow with the COU[facility]ADIN CLINIC.  I am glad that they are able to tailor your medications safely.  Watch out for excessive gum or nose bleeding, or any  acid indigestion symptoms, or any blood in your urine or stool.  Watch out for lightheadedness upon sudden standing.  Please call me with any questions or concerns.  I have reordered your WARFARIN for 6 more months.  Keeping it in a limited amount reminds me to also check markers like blood count and such.    You are also overdue for your FASTING laboratory examinations!  Please have these done any time soon at any  facility.  I will send word regarding results and possible changes.  I will also refill your ATORVASTATIN once I verify that it is safe to do so.    You are safe to do the INFLUENZA vaccination anytime soon.  You can get that at any pharmacy that you favor the most!  They will be very happy to give you the vaccination.    After 2 weeks, consider getting vaccinated with the COVID booster.  After another 2 weeks, get yourself scheduled for the shingles vaccination, SHINGRIX, which is a series of 2 injections at least 1 month apart.    Please keep your vaccinations at least 2 weeks apart, so that you can get the most out of each vaccination.    Again, thank you very much for coming.  I am very happy to see you.  I am glad to see that you are back on your feet!  I am happy that you found a regimen that is doing you good.  Please continue using the pool close to your house.  Please let me know if they need an order for physical therapy or anything similar.    Please come back in 3 months.  I will send word sooner with results and possible changes.  Please call sooner with any questions or concerns.  Please continue to take care of yourself and each other, and please continue to pray for our recovery from this pandemic.  I hope you had a good Jennifer, and I do wish you a happy new year!            0  Return in 3 months.  40 minutes please.  Consider repeat laboratory examinations if needed.  Yearly COMPLETE physical examination.  Review preventive strategies, cardiovascular risk.  Coordinate with vascular  surgery, chronic pain, physical therapy, other specialties as patient is seen.  Hopefully, psychiatry.  Reassess mood, energy, function.  Patient already successful, lost significant weight.            0

## 2024-01-31 NOTE — PATIENT INSTRUCTIONS
Thank you very much for coming.  I am very happy to see you back on your feet!    I believe that you will benefit from seeing PSYCHIATRY anytime soon.  I do understand that it was hard to get into the practice of Dr. Carrera, but I believe that he is the one that can most help you, especially since he also knows your .  If not, there is also Dr. Marley and his team.  They are mainly at [facility] or further [place].  Let us try Dr. Carrera and his group first.    URINE studies today.  I will send word regarding results and possible changes.  Please drink lots of fluids throughout the day.  Please urinate often while awake.    Please continue to follow with the COUMADIN CLINIC.  I am glad that they are able to tailor your medications safely.  Watch out for excessive gum or nose bleeding, or any acid indigestion symptoms, or any blood in your urine or stool.  Watch out for lightheadedness upon sudden standing.  Please call me with any questions or concerns.  I have reordered your WARFARIN for 6 more months.  Keeping it in a limited amount reminds me to also check markers like blood count and such.    You are also overdue for your FASTING laboratory examinations!  Please have these done any time soon at any  facility.  I will send word regarding results and possible changes.  I will also refill your ATORVASTATIN once I verify that it is safe to do so.    You are safe to do the INFLUENZA vaccination anytime soon.  You can get that at any pharmacy that you favor the most!  They will be very happy to give you the vaccination.    After 2 weeks, consider getting vaccinated with the COVID booster.  After another 2 weeks, get yourself scheduled for the shingles vaccination, SHINGRIX, which is a series of 2 injections at least 1 month apart.    Please keep your vaccinations at least 2 weeks apart, so that you can get the most out of each vaccination.    Again, thank you very much for coming.  I am very happy to  see you.  I am glad to see that you are back on your feet!  I am happy that you found a regimen that is doing you good.  Please continue using the pool close to your house.  Please let me know if they need an order for physical therapy or anything similar.    Please come back in 3 months.  I will send word sooner with results and possible changes.  Please call sooner with any questions or concerns.  Please continue to take care of yourself and each other, and please continue to pray for our recovery from this pandemic.  I hope you had a good [place], and I do wish you a happy new year!            0  Return in 3 months.  40 minutes please.  Consider repeat laboratory examinations if needed.  Yearly COMPLETE physical examination.  Review preventive strategies, cardiovascular risk.  Coordinate with vascular surgery, chronic pain, physical therapy, other specialties as patient is seen.  Hopefully, psychiatry.  Reassess mood, energy, function.  Patient already successful, lost significant weight.            0

## 2024-02-01 ENCOUNTER — LAB (OUTPATIENT)
Dept: LAB | Facility: LAB | Age: 64
End: 2024-02-01
Payer: COMMERCIAL

## 2024-02-01 DIAGNOSIS — R26.0 ATAXIC GAIT: ICD-10-CM

## 2024-02-01 DIAGNOSIS — F33.1 MAJOR DEPRESSIVE DISORDER, RECURRENT EPISODE, MODERATE DEGREE (MULTI): ICD-10-CM

## 2024-02-01 DIAGNOSIS — G89.4 CHRONIC PAIN SYNDROME: ICD-10-CM

## 2024-02-01 DIAGNOSIS — M62.82 NON-TRAUMATIC RHABDOMYOLYSIS: ICD-10-CM

## 2024-02-01 DIAGNOSIS — F11.21 OPIOID DEPENDENCE IN REMISSION (MULTI): ICD-10-CM

## 2024-02-01 DIAGNOSIS — I10 ESSENTIAL HYPERTENSION: ICD-10-CM

## 2024-02-01 DIAGNOSIS — R53.81 DEBILITY: ICD-10-CM

## 2024-02-01 DIAGNOSIS — E78.2 MIXED HYPERLIPIDEMIA: ICD-10-CM

## 2024-02-01 DIAGNOSIS — D52.9 ANEMIA DUE TO FOLIC ACID DEFICIENCY, UNSPECIFIED DEFICIENCY TYPE: ICD-10-CM

## 2024-02-01 DIAGNOSIS — E66.09 CLASS 1 OBESITY DUE TO EXCESS CALORIES WITH SERIOUS COMORBIDITY AND BODY MASS INDEX (BMI) OF 33.0 TO 33.9 IN ADULT: ICD-10-CM

## 2024-02-01 DIAGNOSIS — I74.3 ARTERIAL EMBOLISM AND THROMBOSIS OF LOWER EXTREMITY (MULTI): ICD-10-CM

## 2024-02-01 DIAGNOSIS — R73.9 HYPERGLYCEMIA: ICD-10-CM

## 2024-02-01 DIAGNOSIS — R79.89 AZOTEMIA: ICD-10-CM

## 2024-02-01 DIAGNOSIS — U07.1 COVID-19 VIRUS INFECTION: ICD-10-CM

## 2024-02-01 LAB
ALBUMIN SERPL BCP-MCNC: 4.3 G/DL (ref 3.4–5)
ALP SERPL-CCNC: 68 U/L (ref 33–136)
ALT SERPL W P-5'-P-CCNC: 30 U/L (ref 7–45)
ANION GAP SERPL CALC-SCNC: 13 MMOL/L (ref 10–20)
AST SERPL W P-5'-P-CCNC: 20 U/L (ref 9–39)
BACTERIA UR CULT: NORMAL
BASOPHILS # BLD AUTO: 0.04 X10*3/UL (ref 0–0.1)
BASOPHILS NFR BLD AUTO: 0.8 %
BILIRUB SERPL-MCNC: 0.6 MG/DL (ref 0–1.2)
BUN SERPL-MCNC: 17 MG/DL (ref 6–23)
CALCIUM SERPL-MCNC: 9.9 MG/DL (ref 8.6–10.3)
CHLORIDE SERPL-SCNC: 104 MMOL/L (ref 98–107)
CHOLEST SERPL-MCNC: 185 MG/DL (ref 0–199)
CHOLESTEROL/HDL RATIO: 2.6
CK SERPL-CCNC: 48 U/L (ref 0–215)
CO2 SERPL-SCNC: 27 MMOL/L (ref 21–32)
CREAT SERPL-MCNC: 0.93 MG/DL (ref 0.5–1.05)
EGFRCR SERPLBLD CKD-EPI 2021: 70 ML/MIN/1.73M*2
EOSINOPHIL # BLD AUTO: 0.1 X10*3/UL (ref 0–0.7)
EOSINOPHIL NFR BLD AUTO: 1.9 %
ERYTHROCYTE [DISTWIDTH] IN BLOOD BY AUTOMATED COUNT: 13.3 % (ref 11.5–14.5)
EST. AVERAGE GLUCOSE BLD GHB EST-MCNC: 111 MG/DL
FOLATE SERPL-MCNC: 14.6 NG/ML
GLUCOSE SERPL-MCNC: 77 MG/DL (ref 74–99)
HBA1C MFR BLD: 5.5 %
HCT VFR BLD AUTO: 41.1 % (ref 36–46)
HDLC SERPL-MCNC: 70 MG/DL
HGB BLD-MCNC: 14 G/DL (ref 12–16)
HOLD SPECIMEN: NORMAL
IMM GRANULOCYTES # BLD AUTO: 0.01 X10*3/UL (ref 0–0.7)
IMM GRANULOCYTES NFR BLD AUTO: 0.2 % (ref 0–0.9)
LDLC SERPL CALC-MCNC: 100 MG/DL
LYMPHOCYTES # BLD AUTO: 2.35 X10*3/UL (ref 1.2–4.8)
LYMPHOCYTES NFR BLD AUTO: 44.6 %
MCH RBC QN AUTO: 32.7 PG (ref 26–34)
MCHC RBC AUTO-ENTMCNC: 34.1 G/DL (ref 32–36)
MCV RBC AUTO: 96 FL (ref 80–100)
MONOCYTES # BLD AUTO: 0.48 X10*3/UL (ref 0.1–1)
MONOCYTES NFR BLD AUTO: 9.1 %
NEUTROPHILS # BLD AUTO: 2.29 X10*3/UL (ref 1.2–7.7)
NEUTROPHILS NFR BLD AUTO: 43.4 %
NON HDL CHOLESTEROL: 115 MG/DL (ref 0–149)
NRBC BLD-RTO: 0 /100 WBCS (ref 0–0)
PLATELET # BLD AUTO: 263 X10*3/UL (ref 150–450)
POTASSIUM SERPL-SCNC: 4.4 MMOL/L (ref 3.5–5.3)
PROT SERPL-MCNC: 6.4 G/DL (ref 6.4–8.2)
RBC # BLD AUTO: 4.28 X10*6/UL (ref 4–5.2)
SODIUM SERPL-SCNC: 140 MMOL/L (ref 136–145)
TRIGL SERPL-MCNC: 74 MG/DL (ref 0–149)
TSH SERPL-ACNC: 2.24 MIU/L (ref 0.44–3.98)
VIT B12 SERPL-MCNC: 233 PG/ML (ref 211–911)
VLDL: 15 MG/DL (ref 0–40)
WBC # BLD AUTO: 5.3 X10*3/UL (ref 4.4–11.3)

## 2024-02-01 PROCEDURE — 80053 COMPREHEN METABOLIC PANEL: CPT

## 2024-02-01 PROCEDURE — 82550 ASSAY OF CK (CPK): CPT

## 2024-02-01 PROCEDURE — 84443 ASSAY THYROID STIM HORMONE: CPT

## 2024-02-01 PROCEDURE — 83036 HEMOGLOBIN GLYCOSYLATED A1C: CPT

## 2024-02-01 PROCEDURE — 82607 VITAMIN B-12: CPT

## 2024-02-01 PROCEDURE — 82746 ASSAY OF FOLIC ACID SERUM: CPT

## 2024-02-01 PROCEDURE — 80061 LIPID PANEL: CPT

## 2024-02-01 PROCEDURE — 36415 COLL VENOUS BLD VENIPUNCTURE: CPT

## 2024-02-01 PROCEDURE — 85025 COMPLETE CBC W/AUTO DIFF WBC: CPT

## 2024-02-05 DIAGNOSIS — E78.00 PURE HYPERCHOLESTEROLEMIA WITH TARGET LOW DENSITY LIPOPROTEIN (LDL) CHOLESTEROL LESS THAN 130 MG/DL: ICD-10-CM

## 2024-02-07 RX ORDER — ATORVASTATIN CALCIUM 80 MG/1
80 TABLET, FILM COATED ORAL NIGHTLY
Qty: 90 TABLET | Refills: 1 | Status: SHIPPED | OUTPATIENT
Start: 2024-02-07 | End: 2024-08-05

## 2024-02-14 ENCOUNTER — ANTICOAGULATION - WARFARIN VISIT (OUTPATIENT)
Dept: CARDIOLOGY | Facility: CLINIC | Age: 64
End: 2024-02-14
Payer: COMMERCIAL

## 2024-02-14 ENCOUNTER — OFFICE VISIT (OUTPATIENT)
Dept: CARDIOLOGY | Facility: CLINIC | Age: 64
End: 2024-02-14
Payer: COMMERCIAL

## 2024-02-14 VITALS
BODY MASS INDEX: 32.27 KG/M2 | SYSTOLIC BLOOD PRESSURE: 118 MMHG | HEIGHT: 64 IN | HEART RATE: 87 BPM | WEIGHT: 189 LBS | DIASTOLIC BLOOD PRESSURE: 73 MMHG | RESPIRATION RATE: 16 BRPM

## 2024-02-14 DIAGNOSIS — I74.9 ARTERIAL EMBOLISM (MULTI): Primary | ICD-10-CM

## 2024-02-14 DIAGNOSIS — I74.9 ARTERIAL EMBOLISM (MULTI): ICD-10-CM

## 2024-02-14 LAB
POC INR: 2.7
POC PROTHROMBIN TIME: NORMAL

## 2024-02-14 PROCEDURE — 1036F TOBACCO NON-USER: CPT | Performed by: INTERNAL MEDICINE

## 2024-02-14 PROCEDURE — 99211 OFF/OP EST MAY X REQ PHY/QHP: CPT | Performed by: INTERNAL MEDICINE

## 2024-02-14 PROCEDURE — 99214 OFFICE O/P EST MOD 30 MIN: CPT | Mod: 25 | Performed by: INTERNAL MEDICINE

## 2024-02-14 PROCEDURE — 3074F SYST BP LT 130 MM HG: CPT | Performed by: INTERNAL MEDICINE

## 2024-02-14 PROCEDURE — 85610 PROTHROMBIN TIME: CPT | Mod: QW

## 2024-02-14 PROCEDURE — 3078F DIAST BP <80 MM HG: CPT | Performed by: INTERNAL MEDICINE

## 2024-02-14 PROCEDURE — 3008F BODY MASS INDEX DOCD: CPT | Performed by: INTERNAL MEDICINE

## 2024-02-14 PROCEDURE — 99214 OFFICE O/P EST MOD 30 MIN: CPT | Performed by: INTERNAL MEDICINE

## 2024-02-14 ASSESSMENT — PAIN SCALES - GENERAL: PAINLEVEL: 0-NO PAIN

## 2024-02-14 ASSESSMENT — PATIENT HEALTH QUESTIONNAIRE - PHQ9
SUM OF ALL RESPONSES TO PHQ9 QUESTIONS 1 AND 2: 0
2. FEELING DOWN, DEPRESSED OR HOPELESS: NOT AT ALL
1. LITTLE INTEREST OR PLEASURE IN DOING THINGS: NOT AT ALL

## 2024-02-14 NOTE — PROGRESS NOTES
Patient identification verified with 2 identifiers.    Location: [facility] - [address] [place] [id number] [phone]     Referring Physician: Dr. Stephan Martins  Enrollment/ Re-enrollment date: 4/21/2024   INR Goal: 2.0-3.0  INR monitoring is per [facility] protocol.  Anticoagulation Medication: warfarin  Indication: Arterial Embolism    Subjective   Bleeding signs/symptoms:      Bruising:     Major bleeding event:    Thrombosis signs/symptoms:    Thromboembolic event:    Missed doses:    Extra doses:    Medication changes:    Dietary changes:    Change in health:    Change in activity:    Alcohol:    Other concerns:      Upcoming Procedures:  Does the Patient Have any upcoming procedures that require interruption in anticoagulation therapy? no  Does the patient require bridging? no      Anticoagulation Summary  As of 2/14/2024      INR goal:  2.0-3.0   TTR:  57.6 % (3.9 mo)   INR used for dosing:                 Assessment/Plan   Therapeutic     1. New dose: no change    2. Next INR: 1 month      Education provided to patient during the visit:  Patient instructed to call in interim with questions, concerns and changes.   Patient educated on interactions between medications and warfarin.   Patient educated on dietary consistency in vitamin k consumption.   Patient educated on affects of alcohol consumption while taking warfarin.

## 2024-02-14 NOTE — PATIENT INSTRUCTIONS
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 Doing well on the warfarin. Discussed continuing this now. No plans to trial DOAC - she is comfortable with the warfarin. Continue all other meds. Follow up 6 months.

## 2024-02-14 NOTE — PROGRESS NOTES
OUTPATIENT FOLLOW-UP -  VASCULAR MEDICINE    DOS:  2/14/24  Last seen:    8/23/23    REQUESTING PHYSICIAN:  Dr. 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 INR is followed by the PCP. Not bleeding on the warfarin. Seeing Dr. Cash next week. 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 Still doing ketamine infusions once a month - thinks this helps the nerve injury. States the pain is still always present but manageable and no longer consuming.    Currently doing water walking. Will have some cramping of the calf and foot right - this starts at about an hour. Thinks she can walk about 1/4 mile without stopping on land. Feels this is better than before. Having some nocturnal cramping but this is on days with more activity.      From prev notes:  From prev notes:  hospital admission with ALI. Admitted to [facility] 12/8-12/23/2022 with acute on chronic limb ischemia. Underwent thrombectomy via thigh and leg incisions with angioplasty and stenting of the right SFA and AK pop and TP trunk angioplasty. Also had 4 compartment fasciotomy at that time. From eval - reports h/o HCY elevation, +elevated lipoprotein (A) x 1 (other check was normal); APLA - normal; VERA negative. ECHO - no PFO - fairly normal.     By her report the RLE foot changes with paraesthesia, numbness, mottling and IC dated to 5/2022. Was being evaluated for LCS as an etiology until duplex revealed arterial occlusion and was admitted for eval and procedures as noted.   Has a h/o RUE thromboembolism approx 15 years ago [2004] - s/p embolectomy in [place] ([place]). [phone] was also evaluated for ?TIA vs complex migraine - this was facial droop and some arm drift. +COVID x2 in 2020 - also with c/f facial droop recurring in 2021.  "Had loop recorder in the past - denies knowledge of arrhythmia. Reports prev work up for thromboembolic disorders in [place] but records not available.       - no pregnancy loss. One pregnancy with pre-eclampsia and IUGR. Denies a personal h/o VTE.      REVIEW OF SYSTEMS:     weight is stable  No sores, ulcers, rashes, skin lesions  No BRBPR, melena, hematuria  No bleeding  No edema, no calf pain    PHYSICAL EXAMINATION:   /73 (BP Location: Right arm, Patient Position: Sitting)   Pulse 87   Resp 16   Ht 1.626 m (5' 4\")   Wt 85.7 kg (189 lb)   BMI 32.44 kg/m²     Gen: Appears well, NAD  Chest: CTA  CVS: regular without murmur or gallop  Ext: no edema, nontender  Skin: good condition without wounds or lesions  Mood and affect appropriate    ADDITIONAL DATA:    no compression worn  right calf:  42.0cm  left calf:  42.5cm     ASSESSMENT/PLAN:    here for follow up 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 Doing well on the warfarin. Discussed continuing this now. No plans to trial DOAC - she is comfortable with the warfarin. Continue all other meds. Follow up 6 months.   "

## 2024-02-19 ENCOUNTER — APPOINTMENT (OUTPATIENT)
Dept: VASCULAR SURGERY | Facility: HOSPITAL | Age: 64
End: 2024-02-19
Payer: COMMERCIAL

## 2024-02-19 ENCOUNTER — APPOINTMENT (OUTPATIENT)
Dept: VASCULAR MEDICINE | Facility: HOSPITAL | Age: 64
End: 2024-02-19
Payer: COMMERCIAL

## 2024-02-19 ENCOUNTER — TELEPHONE (OUTPATIENT)
Dept: VASCULAR SURGERY | Facility: HOSPITAL | Age: 64
End: 2024-02-19
Payer: COMMERCIAL

## 2024-02-19 DIAGNOSIS — I73.9 PAD (PERIPHERAL ARTERY DISEASE) (CMS-HCC): Primary | ICD-10-CM

## 2024-02-20 ENCOUNTER — ANCILLARY PROCEDURE (OUTPATIENT)
Dept: VASCULAR MEDICINE | Facility: CLINIC | Age: 64
End: 2024-02-20
Payer: COMMERCIAL

## 2024-02-20 ENCOUNTER — OFFICE VISIT (OUTPATIENT)
Dept: VASCULAR SURGERY | Facility: CLINIC | Age: 64
End: 2024-02-20
Payer: COMMERCIAL

## 2024-02-20 VITALS — HEART RATE: 87 BPM | DIASTOLIC BLOOD PRESSURE: 69 MMHG | SYSTOLIC BLOOD PRESSURE: 123 MMHG

## 2024-02-20 DIAGNOSIS — I73.9 PAD (PERIPHERAL ARTERY DISEASE) (CMS-HCC): ICD-10-CM

## 2024-02-20 DIAGNOSIS — I73.9 PAD (PERIPHERAL ARTERY DISEASE) (CMS-HCC): Primary | ICD-10-CM

## 2024-02-20 PROCEDURE — 93926 LOWER EXTREMITY STUDY: CPT | Performed by: SURGERY

## 2024-02-20 PROCEDURE — 3008F BODY MASS INDEX DOCD: CPT | Performed by: SURGERY

## 2024-02-20 PROCEDURE — 93922 UPR/L XTREMITY ART 2 LEVELS: CPT | Performed by: SURGERY

## 2024-02-20 PROCEDURE — 93926 LOWER EXTREMITY STUDY: CPT

## 2024-02-20 PROCEDURE — 93922 UPR/L XTREMITY ART 2 LEVELS: CPT

## 2024-02-20 PROCEDURE — 3074F SYST BP LT 130 MM HG: CPT | Performed by: SURGERY

## 2024-02-20 PROCEDURE — 3078F DIAST BP <80 MM HG: CPT | Performed by: SURGERY

## 2024-02-20 PROCEDURE — 1036F TOBACCO NON-USER: CPT | Performed by: SURGERY

## 2024-02-20 PROCEDURE — 99213 OFFICE O/P EST LOW 20 MIN: CPT | Performed by: SURGERY

## 2024-02-20 PROCEDURE — 99213 OFFICE O/P EST LOW 20 MIN: CPT | Mod: 25 | Performed by: SURGERY

## 2024-02-20 RX ORDER — GABAPENTIN 600 MG/1
600 TABLET ORAL 3 TIMES DAILY
COMMUNITY

## 2024-02-20 ASSESSMENT — PAIN SCALES - GENERAL: PAINLEVEL: 2

## 2024-02-20 NOTE — PROGRESS NOTES
Patient returns for follow-up after her thromboembolism event of the right leg.  This was a likely in situ thrombosis due to vasospasm and her underlying hypercoagulability.  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  She still has decreased sensation and electrical sensations but these are far more tolerable than they have been in the past.  She is exercising aggressively at her local gym including water walking.  She develops cramps or leg and feet but they are tolerable.  They do go away with rest.  Her ankle-brachial indices are nearly normal.  The right is 0.90 and the left is 1.11.  The toe-brachial index is 0.60 on the right and 1.08 on the left.  She is tolerating her Coumadin.  She is on Cardizem.  Her duplex shows multiphasic flows in all her tibial vessels.    I feel very comfortable following up in a year.  She will get an interval examination with Dr. Quiroga in 6 months.  To continue with her anticoagulation and vasodilator.        Total time with patient was 30 minutes including chart review and testing review

## 2024-02-21 DIAGNOSIS — I73.9 PAD (PERIPHERAL ARTERY DISEASE) (CMS-HCC): ICD-10-CM

## 2024-02-21 DIAGNOSIS — M54.16 LUMBAR RADICULAR PAIN: ICD-10-CM

## 2024-02-22 RX ORDER — HYDROCODONE BITARTRATE AND ACETAMINOPHEN 5; 325 MG/1; MG/1
2 TABLET ORAL 2 TIMES DAILY PRN
Qty: 120 TABLET | Refills: 0 | Status: SHIPPED | OUTPATIENT
Start: 2024-02-22 | End: 2024-03-21 | Stop reason: SDUPTHER

## 2024-02-27 ENCOUNTER — INFUSION (OUTPATIENT)
Dept: INFUSION THERAPY | Facility: CLINIC | Age: 64
End: 2024-02-27
Payer: COMMERCIAL

## 2024-02-27 VITALS
OXYGEN SATURATION: 99 % | RESPIRATION RATE: 12 BRPM | SYSTOLIC BLOOD PRESSURE: 153 MMHG | DIASTOLIC BLOOD PRESSURE: 79 MMHG | HEART RATE: 84 BPM | TEMPERATURE: 97.2 F

## 2024-02-27 DIAGNOSIS — U09.9 POST-COVID CHRONIC COUGH: ICD-10-CM

## 2024-02-27 DIAGNOSIS — M54.16 LUMBAR RADICULAR PAIN: Primary | ICD-10-CM

## 2024-02-27 DIAGNOSIS — R29.818 NEUROGENIC CLAUDICATION: ICD-10-CM

## 2024-02-27 DIAGNOSIS — R05.3 POST-COVID CHRONIC COUGH: ICD-10-CM

## 2024-02-27 PROCEDURE — 2500000004 HC RX 250 GENERAL PHARMACY W/ HCPCS (ALT 636 FOR OP/ED): Performed by: NURSE PRACTITIONER

## 2024-02-27 PROCEDURE — 96368 THER/DIAG CONCURRENT INF: CPT | Mod: INF

## 2024-02-27 PROCEDURE — 96365 THER/PROPH/DIAG IV INF INIT: CPT | Mod: INF

## 2024-02-27 PROCEDURE — 96375 TX/PRO/DX INJ NEW DRUG ADDON: CPT | Mod: INF

## 2024-02-27 PROCEDURE — 2500000004 HC RX 250 GENERAL PHARMACY W/ HCPCS (ALT 636 FOR OP/ED)

## 2024-02-27 PROCEDURE — 2500000005 HC RX 250 GENERAL PHARMACY W/O HCPCS: Performed by: NURSE PRACTITIONER

## 2024-02-27 RX ORDER — FAMOTIDINE 10 MG/ML
20 INJECTION INTRAVENOUS ONCE AS NEEDED
Status: CANCELLED | OUTPATIENT
Start: 2024-03-01

## 2024-02-27 RX ORDER — DIPHENHYDRAMINE HYDROCHLORIDE 50 MG/ML
25 INJECTION INTRAMUSCULAR; INTRAVENOUS ONCE
Status: CANCELLED | OUTPATIENT
Start: 2024-03-01 | End: 2024-03-01

## 2024-02-27 RX ORDER — METOCLOPRAMIDE HYDROCHLORIDE 5 MG/ML
10 INJECTION INTRAMUSCULAR; INTRAVENOUS ONCE
Status: CANCELLED | OUTPATIENT
Start: 2024-03-01 | End: 2024-03-01

## 2024-02-27 RX ORDER — EPINEPHRINE 0.3 MG/.3ML
0.3 INJECTION SUBCUTANEOUS EVERY 5 MIN PRN
Status: CANCELLED | OUTPATIENT
Start: 2024-03-01

## 2024-02-27 RX ORDER — METOPROLOL TARTRATE 25 MG/1
25 TABLET, FILM COATED ORAL ONCE
Status: CANCELLED | OUTPATIENT
Start: 2024-03-01 | End: 2024-03-01

## 2024-02-27 RX ORDER — ALBUTEROL SULFATE 0.83 MG/ML
3 SOLUTION RESPIRATORY (INHALATION) AS NEEDED
Status: CANCELLED | OUTPATIENT
Start: 2024-03-01

## 2024-02-27 RX ORDER — KETOROLAC TROMETHAMINE 30 MG/ML
30 INJECTION, SOLUTION INTRAMUSCULAR; INTRAVENOUS ONCE
Status: CANCELLED | OUTPATIENT
Start: 2024-03-01 | End: 2024-03-01

## 2024-02-27 RX ORDER — KETOROLAC TROMETHAMINE 30 MG/ML
30 INJECTION, SOLUTION INTRAMUSCULAR; INTRAVENOUS ONCE
Status: COMPLETED | OUTPATIENT
Start: 2024-02-27 | End: 2024-02-27

## 2024-02-27 RX ORDER — KETOROLAC TROMETHAMINE 30 MG/ML
INJECTION, SOLUTION INTRAMUSCULAR; INTRAVENOUS
Status: COMPLETED
Start: 2024-02-27 | End: 2024-02-27

## 2024-02-27 RX ORDER — ONDANSETRON HYDROCHLORIDE 2 MG/ML
4 INJECTION, SOLUTION INTRAVENOUS ONCE
Status: CANCELLED | OUTPATIENT
Start: 2024-03-01 | End: 2024-03-01

## 2024-02-27 RX ORDER — DIPHENHYDRAMINE HYDROCHLORIDE 50 MG/ML
50 INJECTION INTRAMUSCULAR; INTRAVENOUS AS NEEDED
Status: CANCELLED | OUTPATIENT
Start: 2024-03-01

## 2024-02-27 RX ORDER — NITROGLYCERIN 0.4 MG/1
0.4 TABLET SUBLINGUAL ONCE
Status: CANCELLED | OUTPATIENT
Start: 2024-03-01 | End: 2024-03-01

## 2024-02-27 RX ADMIN — KETOROLAC TROMETHAMINE 30 MG: 30 INJECTION, SOLUTION INTRAMUSCULAR; INTRAVENOUS at 13:50

## 2024-02-27 RX ADMIN — Medication: at 13:50

## 2024-02-27 RX ADMIN — PROPOFOL 100 MG: 10 INJECTION, EMULSION INTRAVENOUS at 13:50

## 2024-02-27 ASSESSMENT — ENCOUNTER SYMPTOMS
OCCASIONAL FEELINGS OF UNSTEADINESS: 0
DEPRESSION: 0
LOSS OF SENSATION IN FEET: 0

## 2024-02-27 ASSESSMENT — PAIN - FUNCTIONAL ASSESSMENT
PAIN_FUNCTIONAL_ASSESSMENT: 0-10
PAIN_FUNCTIONAL_ASSESSMENT: 0-10

## 2024-02-27 ASSESSMENT — PAIN DESCRIPTION - DESCRIPTORS: DESCRIPTORS: BURNING

## 2024-02-27 ASSESSMENT — COLUMBIA-SUICIDE SEVERITY RATING SCALE - C-SSRS
6. HAVE YOU EVER DONE ANYTHING, STARTED TO DO ANYTHING, OR PREPARED TO DO ANYTHING TO END YOUR LIFE?: NO
1. IN THE PAST MONTH, HAVE YOU WISHED YOU WERE DEAD OR WISHED YOU COULD GO TO SLEEP AND NOT WAKE UP?: NO
2. HAVE YOU ACTUALLY HAD ANY THOUGHTS OF KILLING YOURSELF?: NO

## 2024-02-27 ASSESSMENT — PAIN SCALES - GENERAL
PAINLEVEL: 2
PAINLEVEL_OUTOF10: 0 - NO PAIN
PAINLEVEL_OUTOF10: 3

## 2024-02-27 NOTE — PROGRESS NOTES
S: Patient here for 11 opioid sparing pain infusion. Patient reports 60% reduction in pain after last infusion that lasted 3 weeks.    Purpose of pain infusion meds explained along with potential side effects.  Patient verbalized understanding.    B: Pain Issues 3/10 lower legs    A: Patient currently has pain described on flow sheet documentation. Designated  is . Patient last ate solid food 4 hours ago, and had liquid 2 hours ago.    R: Plan; Obtain IV access, do patient risk assessment, and start opioid sparing infusion as ordered. Monitoring for S/S of adverse reactions.

## 2024-02-27 NOTE — PATIENT INSTRUCTIONS
Today :We administered (ketamine 30 mg, lidocaine (Xylocaine) 20 mg/mL (2 %) 300 mg in sodium chloride 0.9% 500 mL IV), propofol (Diprivan) 100 mg in dextrose 5 % in water (D5W) 25 mL, ketorolac, and ketorolac.     For:   1. Lumbar radicular pain    2. Neurogenic claudication    3. Post-COVID chronic cough         Your next appointment is due in:  4 weeks  [facility]      Pain Infusion Aftercare Instructions      1. It is normal to feel sedated, tired and low in energy after a pain infusion. DO NOT DRIVE, OPERATE ANY MACHINERY, OR MAKE ANY IMPORTANT DECISIONS FOR AT LEAST 24 HOURS AFTER THE INFUSION.     2. Call the pain center at [phone] with any problems, questions, or concerns.     3. Eat light after the infusion. If you feel queasy or sick to your stomach, laying down with your eyes closed may help. When you resume eating start with something mild like clear liquids, yogurt, applesauce, crackers, etc… Gradually advance to a regular diet.     4. Do not leave your house alone the evening of your pain infusion.     5. No alcohol or sedative medications, such as sleeping pills, for 24 hours after your pain infusion.     6. Resume all other prescribed medications unless directed otherwise by you physician.     7. If you have any medical emergencies, call 911 or go directly to the closest emergency room.        Please read the  Medication Guide that was given to you and reviewed during todays visit.     (Tell all doctors including dentists that you are taking this medication)     Go to the emergency room or call 911 if:  -You have signs of allergic reaction:   -Rash, hives, itching.   -Swollen, blistered, peeling skin.   -Swelling of face, lips, mouth, tongue or throat.   -Tightness of chest, trouble breathing, swallowing or talking     Call your doctor:  - If IV / injection site gets red, warm, swollen, itchy or leaks fluid or pus.     (Leave dressing on your IV site for at least 2  hours and keep area clean and dry  - If you get sick or have symptoms of infection or are not feeling well for any reason.    (Wash your hands often, stay away from people who are sick)  - If you have side effects from your medication that do not go away or are bothersome.     (Refer to the teaching your nurse gave you for side effects to call your doctor about)    - Common side effects may include:  stuffy nose, headache, feeling tired, muscle aches, upset stomach  - Before receiving any vaccines     - Call the [facility] at   If:  - You get sick, are on antibiotics, have had a recent vaccine, have surgery or dental work and your doctor wants your visit rescheduled.  - You need to cancel and reschedule your visit for any reason. Call at least 2 days before your visit if you need to cancel.   - Your insurance changes before your next visit.    (We will need to get approval from your new insurance. This can take up to two weeks.)     The [facility] is opened Monday thru Friday. We are closed on weekends and holidays.   Voice mail will take your call if the center is closed. If you leave a message please allow 24 hours for a call back during weekdays. If you leave a message on a weekend/holiday, we will call you back the next business day.

## 2024-03-04 ENCOUNTER — DOCUMENTATION (OUTPATIENT)
Dept: VASCULAR SURGERY | Facility: HOSPITAL | Age: 64
End: 2024-03-04

## 2024-03-04 ENCOUNTER — OFFICE VISIT (OUTPATIENT)
Dept: PAIN MEDICINE | Facility: CLINIC | Age: 64
End: 2024-03-04
Payer: COMMERCIAL

## 2024-03-04 VITALS
BODY MASS INDEX: 32.27 KG/M2 | SYSTOLIC BLOOD PRESSURE: 121 MMHG | RESPIRATION RATE: 18 BRPM | HEIGHT: 64 IN | HEART RATE: 89 BPM | WEIGHT: 189 LBS | DIASTOLIC BLOOD PRESSURE: 88 MMHG | TEMPERATURE: 97.3 F

## 2024-03-04 DIAGNOSIS — R05.3 POST-COVID CHRONIC COUGH: ICD-10-CM

## 2024-03-04 DIAGNOSIS — R29.818 NEUROGENIC CLAUDICATION: ICD-10-CM

## 2024-03-04 DIAGNOSIS — Z79.899 ENCOUNTER FOR LONG-TERM CURRENT USE OF MEDICATION: Primary | ICD-10-CM

## 2024-03-04 DIAGNOSIS — M54.16 LUMBAR RADICULAR PAIN: ICD-10-CM

## 2024-03-04 DIAGNOSIS — U09.9 POST-COVID CHRONIC COUGH: ICD-10-CM

## 2024-03-04 LAB
AMPHETAMINES UR QL SCN: ABNORMAL
BARBITURATES UR QL SCN: ABNORMAL
BZE UR QL SCN: ABNORMAL
CANNABINOIDS UR QL SCN: ABNORMAL
CREAT UR-MCNC: 222 MG/DL (ref 20–320)
PCP UR QL SCN: ABNORMAL

## 2024-03-04 PROCEDURE — 3008F BODY MASS INDEX DOCD: CPT | Performed by: ANESTHESIOLOGY

## 2024-03-04 PROCEDURE — 3074F SYST BP LT 130 MM HG: CPT | Performed by: ANESTHESIOLOGY

## 2024-03-04 PROCEDURE — 3079F DIAST BP 80-89 MM HG: CPT | Performed by: ANESTHESIOLOGY

## 2024-03-04 PROCEDURE — 99213 OFFICE O/P EST LOW 20 MIN: CPT | Performed by: ANESTHESIOLOGY

## 2024-03-04 PROCEDURE — 80307 DRUG TEST PRSMV CHEM ANLYZR: CPT | Mod: PARLAB | Performed by: ANESTHESIOLOGY

## 2024-03-04 PROCEDURE — 80346 BENZODIAZEPINES1-12: CPT | Mod: PARLAB | Performed by: ANESTHESIOLOGY

## 2024-03-04 PROCEDURE — 1036F TOBACCO NON-USER: CPT | Performed by: ANESTHESIOLOGY

## 2024-03-04 PROCEDURE — 80324 DRUG SCREEN AMPHETAMINES 1/2: CPT | Mod: CCI | Performed by: ANESTHESIOLOGY

## 2024-03-04 RX ORDER — KETOROLAC TROMETHAMINE 30 MG/ML
30 INJECTION, SOLUTION INTRAMUSCULAR; INTRAVENOUS ONCE
Status: CANCELLED | OUTPATIENT
Start: 2024-03-27 | End: 2024-03-27

## 2024-03-04 RX ORDER — ALBUTEROL SULFATE 0.83 MG/ML
3 SOLUTION RESPIRATORY (INHALATION) AS NEEDED
Status: CANCELLED | OUTPATIENT
Start: 2024-03-27

## 2024-03-04 RX ORDER — DIPHENHYDRAMINE HYDROCHLORIDE 50 MG/ML
50 INJECTION INTRAMUSCULAR; INTRAVENOUS AS NEEDED
Status: CANCELLED | OUTPATIENT
Start: 2024-03-27

## 2024-03-04 RX ORDER — EPINEPHRINE 0.3 MG/.3ML
0.3 INJECTION SUBCUTANEOUS EVERY 5 MIN PRN
Status: CANCELLED | OUTPATIENT
Start: 2024-03-27

## 2024-03-04 RX ORDER — NITROGLYCERIN 0.4 MG/1
0.4 TABLET SUBLINGUAL ONCE
Status: CANCELLED | OUTPATIENT
Start: 2024-03-27 | End: 2024-03-27

## 2024-03-04 RX ORDER — FAMOTIDINE 10 MG/ML
20 INJECTION INTRAVENOUS ONCE AS NEEDED
Status: CANCELLED | OUTPATIENT
Start: 2024-03-27

## 2024-03-04 RX ORDER — ONDANSETRON HYDROCHLORIDE 2 MG/ML
4 INJECTION, SOLUTION INTRAVENOUS ONCE
Status: CANCELLED | OUTPATIENT
Start: 2024-03-27 | End: 2024-03-27

## 2024-03-04 SDOH — ECONOMIC STABILITY: FOOD INSECURITY: WITHIN THE PAST 12 MONTHS, THE FOOD YOU BOUGHT JUST DIDN'T LAST AND YOU DIDN'T HAVE MONEY TO GET MORE.: NEVER TRUE

## 2024-03-04 SDOH — ECONOMIC STABILITY: FOOD INSECURITY: WITHIN THE PAST 12 MONTHS, YOU WORRIED THAT YOUR FOOD WOULD RUN OUT BEFORE YOU GOT MONEY TO BUY MORE.: NEVER TRUE

## 2024-03-04 ASSESSMENT — ENCOUNTER SYMPTOMS
SHORTNESS OF BREATH: 0
OCCASIONAL FEELINGS OF UNSTEADINESS: 0
LOSS OF SENSATION IN FEET: 0
DEPRESSION: 0
EYE REDNESS: 0

## 2024-03-04 ASSESSMENT — PAIN SCALES - GENERAL
PAINLEVEL_OUTOF10: 2
PAINLEVEL: 2

## 2024-03-04 ASSESSMENT — LIFESTYLE VARIABLES
HOW OFTEN DO YOU HAVE SIX OR MORE DRINKS ON ONE OCCASION: NEVER
AUDIT-C TOTAL SCORE: 0
SKIP TO QUESTIONS 9-10: 1
HOW MANY STANDARD DRINKS CONTAINING ALCOHOL DO YOU HAVE ON A TYPICAL DAY: PATIENT DOES NOT DRINK
HOW OFTEN DO YOU HAVE A DRINK CONTAINING ALCOHOL: NEVER

## 2024-03-04 ASSESSMENT — PAIN - FUNCTIONAL ASSESSMENT: PAIN_FUNCTIONAL_ASSESSMENT: 0-10

## 2024-03-04 ASSESSMENT — PAIN DESCRIPTION - DESCRIPTORS: DESCRIPTORS: ACHING;BURNING

## 2024-03-04 NOTE — PROGRESS NOTES
Chief Complain  Follow-up (From  right bursa injection from 1/3/24 with 100% that is still lasting./Having right leg pain. /Get infusion every 4 weeks  kayla 65% relief that last 3 weeks)       History Of Present Illness  Angle Martins is a 63 y.o. female here for right lower extremity. The patient rates the pain at 2  on a scale from 0-10.  The patient describes pain as aching, burning.  The pain is worsened by walking and is alleviated by medications prescribed pain medications.  Since the last visit the pain has improved.  The patient denies any fever, chills, weight loss, bladder/bowel incontinence.         Past Medical History  She has a past medical history of COVID-19 and Personal history of other venous thrombosis and embolism.    Surgical History  She has a past surgical history that includes Other surgical history (07/08/2022); Other surgical history (07/08/2022); Other surgical history (07/08/2022); CT angio aorta and bilateral iliofemoral runoff w and or wo IV contrast (12/8/2022); and CT angio aorta and bilateral iliofemoral runoff w and or wo IV contrast (12/13/2022).     Social History  She reports that she quit smoking about 36 years ago. Her smoking use included cigarettes. She has never used smokeless tobacco. She reports current alcohol use. She reports that she does not use drugs.    Family History  No family history on file.     Allergies  Promethazine and Phenothiazines    Review of Systems  Review of Systems   HENT:  Negative for ear pain.    Eyes:  Negative for redness.   Respiratory:  Negative for shortness of breath.    Cardiovascular:  Negative for chest pain.   Psychiatric/Behavioral:  Negative for suicidal ideas.         Physical Exam  Physical Exam  HENT:      Head: Normocephalic.   Eyes:      Extraocular Movements: Extraocular movements intact.   Pulmonary:      Effort: Pulmonary effort is normal.   Neurological:      Mental Status: She is alert and oriented to person, place, and  "time.   Psychiatric:         Mood and Affect: Mood normal.           Last Recorded Vitals  Blood pressure 121/88, pulse 89, temperature 36.3 °C (97.3 °F), resp. rate 18, height 1.626 m (5' 4\"), weight 85.7 kg (189 lb).       Assessment/Plan     Angle Martins is a 63 y.o. female here for follow-up of lower extremity pain.  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  She continues to have neuropathy pain.  Currently getting treated with ketamine infusions and hydrocodone 10 mg twice daily.  She reports significant improvement in her symptoms with the current regimen.  She is very happy with the current regimen.  Last visit she did had intra-articular corticosteroid injection which has provided her with complete relief of her shoulder pain.  Given the excellent relief she has been having from the current regimen I would recommend continuation.  An updated opioid agreement was signed.  UDS was also collected.  I have personally reviewed the OARRS report.  I have considered the risks of abuse, dependence, addiction and diversion.           Jose Armando Velazquez MD  "

## 2024-03-08 LAB
AMPHET UR-MCNC: <50 NG/ML
MDA UR-MCNC: <200 NG/ML
MDEA UR-MCNC: <200 NG/ML
MDMA UR-MCNC: <200 NG/ML
METHAMPHET UR-MCNC: <200 NG/ML
PHENTERMINE UR CFM-MCNC: <200 NG/ML

## 2024-03-09 LAB
1OH-MIDAZOLAM UR CFM-MCNC: <25 NG/ML
6MAM UR CFM-MCNC: <25 NG/ML
7AMINOCLONAZEPAM UR CFM-MCNC: <25 NG/ML
A-OH ALPRAZ UR CFM-MCNC: <25 NG/ML
ALPRAZ UR CFM-MCNC: <25 NG/ML
CHLORDIAZEP UR CFM-MCNC: <25 NG/ML
CLONAZEPAM UR CFM-MCNC: <25 NG/ML
CODEINE UR CFM-MCNC: <50 NG/ML
DIAZEPAM UR CFM-MCNC: <25 NG/ML
EDDP UR CFM-MCNC: <25 NG/ML
FENTANYL UR CFM-MCNC: <2.5 NG/ML
HYDROCODONE CTO UR CFM-MCNC: >2500 NG/ML
HYDROMORPHONE UR CFM-MCNC: 364 NG/ML
LORAZEPAM UR CFM-MCNC: <25 NG/ML
METHADONE UR CFM-MCNC: <25 NG/ML
MIDAZOLAM UR CFM-MCNC: <25 NG/ML
MORPHINE UR CFM-MCNC: <50 NG/ML
NORDIAZEPAM UR CFM-MCNC: <25 NG/ML
NORFENTANYL UR CFM-MCNC: <2.5 NG/ML
NORHYDROCODONE UR CFM-MCNC: >1000 NG/ML
NOROXYCODONE UR CFM-MCNC: <25 NG/ML
NORTRAMADOL UR-MCNC: <50 NG/ML
OXAZEPAM UR CFM-MCNC: <25 NG/ML
OXYCODONE UR CFM-MCNC: <25 NG/ML
OXYMORPHONE UR CFM-MCNC: <25 NG/ML
TEMAZEPAM UR CFM-MCNC: <25 NG/ML
TRAMADOL UR CFM-MCNC: <50 NG/ML
ZOLPIDEM UR CFM-MCNC: <25 NG/ML
ZOLPIDEM UR-MCNC: <25 NG/ML

## 2024-03-13 ENCOUNTER — APPOINTMENT (OUTPATIENT)
Dept: CARDIOLOGY | Facility: CLINIC | Age: 64
End: 2024-03-13
Payer: COMMERCIAL

## 2024-03-13 ENCOUNTER — ANTICOAGULATION - WARFARIN VISIT (OUTPATIENT)
Dept: CARDIOLOGY | Facility: CLINIC | Age: 64
End: 2024-03-13
Payer: COMMERCIAL

## 2024-03-13 DIAGNOSIS — I74.9 ARTERIAL EMBOLISM (MULTI): Primary | ICD-10-CM

## 2024-03-13 LAB
POC INR: 5
POC PROTHROMBIN TIME: NORMAL

## 2024-03-13 PROCEDURE — 85610 PROTHROMBIN TIME: CPT | Mod: QW

## 2024-03-13 PROCEDURE — 99211 OFF/OP EST MAY X REQ PHY/QHP: CPT

## 2024-03-13 NOTE — PROGRESS NOTES
Patient identification verified with 2 identifiers.    Location: [facility] - [address] [place] [address] [phone]     Referring Physician: Dr. Stephan Martins  Enrollment/ Re-enrollment date: 2024   INR Goal: 2.0-3.0  INR monitoring is per [facility] protocol.  Anticoagulation Medication: warfarin  Indication: Arterial Embolism    Subjective   Bleeding signs/symptoms: No    Bruising: Yes  Pt has had some easy bruising   Major bleeding event: No  Thrombosis signs/symptoms: No  Thromboembolic event: No  Missed doses: No  Extra doses: No  Medication changes: Yes  Pt has started Cymbalta and is tapering down on Wellbutrin  Dietary changes: Yes  Pt reports a suppressed appetite since starting Cymbalta.  Change in health: No  Change in activity: No  Alcohol: No  Other concerns: No    Upcoming Procedures:  Does the Patient Have any upcoming procedures that require interruption in anticoagulation therapy? no  Does the patient require bridging? no    Dose maintained at last visit.  27mg warfarin weekly.  Sent secure chat to Dr. Martins regarding INR of 5.0.  He agrees with plan to hold warfarin and recheck INR in 2 days. Pt was advised to go to the ER with any s/s of bleeding.  She will hold 2 doses of warfarin and return for an INR in 2 days 3/15/24.  Anticoagulation Summary  As of 3/13/2024      INR goal:  2.0-3.0   TTR:  53.9 % (5.3 mo)   INR used for dosin.00 (3/13/2024)   Weekly warfarin total:  24 mg               Assessment/Plan   Supratherapeutic.    1. New dose: Will hold 2 doses of coumadin and recheck INR in 2 days.  Will plan for 11% dose reduction for now as well.  Hoping that pt's appetite improves as she gets used to taking Cymbalta.  2. Next INR: 2 days      Education provided to patient during the visit:  Patient instructed to call in interim with questions, concerns and changes.   Patient educated on interactions between medications and warfarin.   Patient  educated on dietary consistency in vitamin k consumption.   Patient educated on affects of alcohol consumption while taking warfarin.

## 2024-03-15 ENCOUNTER — ANTICOAGULATION - WARFARIN VISIT (OUTPATIENT)
Dept: CARDIOLOGY | Facility: CLINIC | Age: 64
End: 2024-03-15
Payer: COMMERCIAL

## 2024-03-15 DIAGNOSIS — I74.9 ARTERIAL EMBOLISM (MULTI): Primary | ICD-10-CM

## 2024-03-15 LAB
POC INR: 3.8
POC PROTHROMBIN TIME: NORMAL

## 2024-03-15 PROCEDURE — 99211 OFF/OP EST MAY X REQ PHY/QHP: CPT

## 2024-03-15 PROCEDURE — 85610 PROTHROMBIN TIME: CPT | Mod: QW

## 2024-03-15 NOTE — PROGRESS NOTES
Patient identification verified with 2 identifiers.    Location: [facility] - [address] [place] [id number] [phone]      Referring Physician: Dr. Stephan Martins  Enrollment/ Re-enrollment date: 4/21/2024   INR Goal: 2.0-3.0  INR monitoring is per [facility] protocol.  Anticoagulation Medication: warfarin  Indication: Arterial Embolism       Subjective   Bleeding signs/symptoms: No    Bruising: No   Major bleeding event: No  Thrombosis signs/symptoms: No  Thromboembolic event: No  Missed doses: No  Extra doses: No  Medication changes: No  Dietary changes: No  Change in health: No  Change in activity: No  Alcohol: No  Other concerns: No    Upcoming Procedures:  Does the Patient Have any upcoming procedures that require interruption in anticoagulation therapy? no  Does the patient require bridging? no      Anticoagulation Summary  As of 3/15/2024      INR goal:  2.0-3.0   TTR:  53.4 % (5.4 mo)   INR used for dosing:  3.80 (3/15/2024)   Weekly warfarin total:  24 mg               Assessment/Plan   Supratherapeutic     1. New dose:  One dose held 3/15. Pt will resume 3/16 at previously ordered reduced dosage.      2. Next INR: 1 week      Education provided to patient during the visit:  Patient instructed to call in interim with questions, concerns and changes.   Patient educated on interactions between medications and warfarin.   Patient educated on dietary consistency in vitamin k consumption.   Patient educated on affects of alcohol consumption while taking warfarin.   Patient educated on signs of bleeding/clotting.   Patient educated on compliance with dosing, follow up appointments, and prescribed plan of care.

## 2024-03-21 DIAGNOSIS — M54.16 LUMBAR RADICULAR PAIN: ICD-10-CM

## 2024-03-21 DIAGNOSIS — M54.31 SCIATICA OF RIGHT SIDE: ICD-10-CM

## 2024-03-21 DIAGNOSIS — I73.9 PAD (PERIPHERAL ARTERY DISEASE) (CMS-HCC): ICD-10-CM

## 2024-03-21 RX ORDER — GABAPENTIN 600 MG/1
600 TABLET ORAL 3 TIMES DAILY
Qty: 90 TABLET | Refills: 3 | Status: SHIPPED | OUTPATIENT
Start: 2024-03-21 | End: 2024-07-19

## 2024-03-21 RX ORDER — HYDROCODONE BITARTRATE AND ACETAMINOPHEN 5; 325 MG/1; MG/1
2 TABLET ORAL 2 TIMES DAILY PRN
Qty: 120 TABLET | Refills: 0 | Status: SHIPPED | OUTPATIENT
Start: 2024-03-23 | End: 2024-04-19 | Stop reason: SDUPTHER

## 2024-03-22 ENCOUNTER — ANTICOAGULATION - WARFARIN VISIT (OUTPATIENT)
Dept: CARDIOLOGY | Facility: CLINIC | Age: 64
End: 2024-03-22
Payer: COMMERCIAL

## 2024-03-22 DIAGNOSIS — I74.9 ARTERIAL EMBOLISM (MULTI): Primary | ICD-10-CM

## 2024-03-22 LAB
POC INR: 1.8
POC PROTHROMBIN TIME: NORMAL

## 2024-03-22 PROCEDURE — 85610 PROTHROMBIN TIME: CPT | Mod: QW

## 2024-03-22 PROCEDURE — 99211 OFF/OP EST MAY X REQ PHY/QHP: CPT

## 2024-03-22 NOTE — PROGRESS NOTES
Patient identification verified with 2 identifiers.    Location: [facility] - [address] [place] [id number] [phone]      Referring Physician: Dr. Stephan Martins  Enrollment/ Re-enrollment date: 4/21/2024   INR Goal: 2.0-3.0  INR monitoring is per [facility] protocol.  Anticoagulation Medication: warfarin  Indication: Arterial Embolism       Subjective   Bleeding signs/symptoms: No    Bruising: No   Major bleeding event: No  Thrombosis signs/symptoms: No  Thromboembolic event: No  Missed doses: No  Extra doses: No  Medication changes: No  Dietary changes: No Patient states appetite is gradually improving.   Change in health: No  Change in activity: No  Alcohol: No  Other concerns: No    Upcoming Procedures:  Does the Patient Have any upcoming procedures that require interruption in anticoagulation therapy? no  Does the patient require bridging? yes      Anticoagulation Summary  As of 3/22/2024      INR goal:  2.0-3.0   TTR:  53.4 % (5.4 mo)   INR used for dosing:                 Assessment/Plan   Subtherapeutic     1. New dose:  Dose increased slightly given supratherapeutic INRs last week.      2. Next INR: 1 week      Education provided to patient during the visit:  Patient instructed to call in interim with questions, concerns and changes.   Patient educated on interactions between medications and warfarin.   Patient educated on dietary consistency in vitamin k consumption.   Patient educated on affects of alcohol consumption while taking warfarin.   Patient educated on signs of bleeding/clotting.   Patient educated on compliance with dosing, follow up appointments, and prescribed plan of care.

## 2024-03-25 DIAGNOSIS — R29.818 NEUROGENIC CLAUDICATION: ICD-10-CM

## 2024-03-25 DIAGNOSIS — R05.3 POST-COVID CHRONIC COUGH: ICD-10-CM

## 2024-03-25 DIAGNOSIS — U09.9 POST-COVID CHRONIC COUGH: ICD-10-CM

## 2024-03-25 DIAGNOSIS — M54.16 LUMBAR RADICULAR PAIN: Primary | ICD-10-CM

## 2024-03-27 ENCOUNTER — INFUSION (OUTPATIENT)
Dept: INFUSION THERAPY | Facility: CLINIC | Age: 64
End: 2024-03-27
Payer: COMMERCIAL

## 2024-03-27 VITALS
DIASTOLIC BLOOD PRESSURE: 75 MMHG | TEMPERATURE: 97.2 F | RESPIRATION RATE: 24 BRPM | HEART RATE: 72 BPM | OXYGEN SATURATION: 97 % | SYSTOLIC BLOOD PRESSURE: 136 MMHG

## 2024-03-27 DIAGNOSIS — R29.818 NEUROGENIC CLAUDICATION: ICD-10-CM

## 2024-03-27 DIAGNOSIS — M54.16 LUMBAR RADICULAR PAIN: ICD-10-CM

## 2024-03-27 DIAGNOSIS — U09.9 POST-COVID CHRONIC COUGH: ICD-10-CM

## 2024-03-27 DIAGNOSIS — R05.3 POST-COVID CHRONIC COUGH: ICD-10-CM

## 2024-03-27 PROCEDURE — 96365 THER/PROPH/DIAG IV INF INIT: CPT | Mod: INF

## 2024-03-27 PROCEDURE — 2500000005 HC RX 250 GENERAL PHARMACY W/O HCPCS: Performed by: NURSE PRACTITIONER

## 2024-03-27 PROCEDURE — 96368 THER/DIAG CONCURRENT INF: CPT | Mod: INF

## 2024-03-27 PROCEDURE — 2500000004 HC RX 250 GENERAL PHARMACY W/ HCPCS (ALT 636 FOR OP/ED)

## 2024-03-27 PROCEDURE — 96375 TX/PRO/DX INJ NEW DRUG ADDON: CPT | Mod: INF

## 2024-03-27 PROCEDURE — 2500000004 HC RX 250 GENERAL PHARMACY W/ HCPCS (ALT 636 FOR OP/ED): Performed by: NURSE PRACTITIONER

## 2024-03-27 RX ORDER — KETOROLAC TROMETHAMINE 30 MG/ML
INJECTION, SOLUTION INTRAMUSCULAR; INTRAVENOUS
Status: COMPLETED
Start: 2024-03-27 | End: 2024-03-27

## 2024-03-27 RX ORDER — ONDANSETRON HYDROCHLORIDE 2 MG/ML
4 INJECTION, SOLUTION INTRAVENOUS ONCE
Status: CANCELLED | OUTPATIENT
Start: 2024-04-26 | End: 2024-04-26

## 2024-03-27 RX ORDER — EPINEPHRINE 0.3 MG/.3ML
0.3 INJECTION SUBCUTANEOUS EVERY 5 MIN PRN
Status: CANCELLED | OUTPATIENT
Start: 2024-04-26

## 2024-03-27 RX ORDER — ALBUTEROL SULFATE 0.83 MG/ML
3 SOLUTION RESPIRATORY (INHALATION) AS NEEDED
Status: CANCELLED | OUTPATIENT
Start: 2024-04-26

## 2024-03-27 RX ORDER — DIPHENHYDRAMINE HYDROCHLORIDE 50 MG/ML
50 INJECTION INTRAMUSCULAR; INTRAVENOUS AS NEEDED
Status: CANCELLED | OUTPATIENT
Start: 2024-04-26

## 2024-03-27 RX ORDER — NITROGLYCERIN 0.4 MG/1
0.4 TABLET SUBLINGUAL ONCE
Status: CANCELLED | OUTPATIENT
Start: 2024-04-26 | End: 2024-04-26

## 2024-03-27 RX ORDER — KETOROLAC TROMETHAMINE 30 MG/ML
30 INJECTION, SOLUTION INTRAMUSCULAR; INTRAVENOUS ONCE
Status: COMPLETED | OUTPATIENT
Start: 2024-03-27 | End: 2024-03-27

## 2024-03-27 RX ORDER — FAMOTIDINE 10 MG/ML
20 INJECTION INTRAVENOUS ONCE AS NEEDED
Status: CANCELLED | OUTPATIENT
Start: 2024-04-26

## 2024-03-27 RX ORDER — KETOROLAC TROMETHAMINE 30 MG/ML
30 INJECTION, SOLUTION INTRAMUSCULAR; INTRAVENOUS ONCE
Status: CANCELLED | OUTPATIENT
Start: 2024-04-26 | End: 2024-04-26

## 2024-03-27 RX ADMIN — Medication: at 12:56

## 2024-03-27 RX ADMIN — KETOROLAC TROMETHAMINE 30 MG: 30 INJECTION, SOLUTION INTRAMUSCULAR at 12:54

## 2024-03-27 RX ADMIN — KETOROLAC TROMETHAMINE 30 MG: 30 INJECTION, SOLUTION INTRAMUSCULAR; INTRAVENOUS at 12:54

## 2024-03-27 RX ADMIN — PROPOFOL 100 MG: 10 INJECTION, EMULSION INTRAVENOUS at 12:56

## 2024-03-27 SDOH — ECONOMIC STABILITY: INCOME INSECURITY: IN THE LAST 12 MONTHS, WAS THERE A TIME WHEN YOU WERE NOT ABLE TO PAY THE MORTGAGE OR RENT ON TIME?: NO

## 2024-03-27 SDOH — ECONOMIC STABILITY: HOUSING INSECURITY
IN THE LAST 12 MONTHS, WAS THERE A TIME WHEN YOU DID NOT HAVE A STEADY PLACE TO SLEEP OR SLEPT IN A SHELTER (INCLUDING NOW)?: NO

## 2024-03-27 ASSESSMENT — SOCIAL DETERMINANTS OF HEALTH (SDOH): IN THE PAST 12 MONTHS, HAS THE ELECTRIC, GAS, OIL, OR WATER COMPANY THREATENED TO SHUT OFF SERVICE IN YOUR HOME?: NO

## 2024-03-27 ASSESSMENT — PAIN SCALES - GENERAL
PAINLEVEL_OUTOF10: 6
PAINLEVEL_OUTOF10: 1

## 2024-03-27 ASSESSMENT — ENCOUNTER SYMPTOMS
DEPRESSION: 0
LOSS OF SENSATION IN FEET: 0
OCCASIONAL FEELINGS OF UNSTEADINESS: 0

## 2024-03-27 ASSESSMENT — PAIN - FUNCTIONAL ASSESSMENT
PAIN_FUNCTIONAL_ASSESSMENT: 0-10
PAIN_FUNCTIONAL_ASSESSMENT: 0-10

## 2024-03-27 ASSESSMENT — PAIN DESCRIPTION - DESCRIPTORS: DESCRIPTORS: ACHING;BURNING;SORE

## 2024-03-27 NOTE — PATIENT INSTRUCTIONS
[facility] OUTPATIENT CENTER      Pain Infusion Aftercare Instructions      1. It is normal to feel sedated, tired and low in energy after a pain infusion. DO NOT DRIVE, OPERATE ANY MACHINERY, OR MAKE ANY IMPORTANT DECISIONS FOR AT LEAST 24 HOURS AFTER THE INFUSION.     2. Call the pain center at [phone] with any problems, questions, or concerns.     3. Eat light after the infusion. If you feel queasy or sick to your stomach, laying down with your eyes closed may help. When you resume eating start with something mild like clear liquids, yogurt, applesauce, crackers, etc… Gradually advance to a regular diet.     4. Do not leave your house alone the evening of your pain infusion.     5. No alcohol or sedative medications, such as sleeping pills, for 24 hours after your pain infusion.     6. Resume all other prescribed medications unless directed otherwise by you physician.     7. If you have any medical emergencies, call 911 or go directly to the closest emergency room.

## 2024-03-27 NOTE — PROGRESS NOTES
S: Patient here for 13th opioid sparing pain infusion. Patient reports 65% reduction in pain after last infusion that lasted 3.5 weeks.    Purpose of pain infusion meds explained along with potential side effects.  Patient verbalized understanding.    B: Pain Issues    A: Patient currently has pain described on flow sheet documentation. Designated  is Niece. Patient last ate solid food >8 hours ago, and had liquid >4 hours ago.    R: Plan; Obtain IV access, do patient risk assessment, and start opioid sparing infusion as ordered. Monitoring for S/S of adverse reactions.    1316: Patient is awake and talkative, tolerating pain infusion well. Continuing to monitor.    1334:Post infusion teaching provided. Patient verbalized understanding. VSS, Patient states pain is less, rated 1/10, same area. Will assist patient to waiting car via wheelchair.

## 2024-03-29 ENCOUNTER — APPOINTMENT (OUTPATIENT)
Dept: CARDIOLOGY | Facility: CLINIC | Age: 64
End: 2024-03-29
Payer: COMMERCIAL

## 2024-03-29 ENCOUNTER — ANTICOAGULATION - WARFARIN VISIT (OUTPATIENT)
Dept: CARDIOLOGY | Facility: CLINIC | Age: 64
End: 2024-03-29
Payer: COMMERCIAL

## 2024-03-29 DIAGNOSIS — I74.9 ARTERIAL EMBOLISM (MULTI): Primary | ICD-10-CM

## 2024-04-02 ENCOUNTER — ANTICOAGULATION - WARFARIN VISIT (OUTPATIENT)
Dept: CARDIOLOGY | Facility: CLINIC | Age: 64
End: 2024-04-02
Payer: COMMERCIAL

## 2024-04-02 DIAGNOSIS — I74.9 ARTERIAL EMBOLISM (MULTI): Primary | ICD-10-CM

## 2024-04-02 LAB
POC INR: 2.7 (ref 2–3)
POC PROTHROMBIN TIME: NORMAL

## 2024-04-02 PROCEDURE — 99211 OFF/OP EST MAY X REQ PHY/QHP: CPT

## 2024-04-02 PROCEDURE — 85610 PROTHROMBIN TIME: CPT | Mod: QW | Performed by: INTERNAL MEDICINE

## 2024-04-02 NOTE — PROGRESS NOTES
Patient identification verified with 2 identifiers.    Location: [facility] - [address] [place] [id number] [phone]      Referring Physician: Dr. Stephan Martins  Enrollment/ Re-enrollment date: 2024   INR Goal: 2.0-3.0  INR monitoring is per [facility] protocol.  Anticoagulation Medication: warfarin  Indication: Arterial Embolism       Subjective   Bleeding signs/symptoms: No    Bruising: No   Major bleeding event: No  Thrombosis signs/symptoms: No  Thromboembolic event: No  Missed doses: No.  Pt denies.  Extra doses: No  Medication changes: No.  Pt denies.  Dietary changes: No.  Pt denies.  Change in health: No.  Pt denies.  Change in activity: No  Alcohol: No  Other concerns: No    Upcoming Procedures:  Does the Patient Have any upcoming procedures that require interruption in anticoagulation therapy? no  Does the patient require bridging? no    Dose was Increased after last visit by approx. 5%.  24mg of warfarin weekly Increased to 25.5mg weekly.  Anticoagulation Summary  As of 2024      INR goal:  2.0-3.0   TTR:  54.7 % (6 mo)   INR used for dosin.70 (2024)   Weekly warfarin total:  25.5 mg               Assessment/Plan     Therapeutic     1. New dose: no change.  Maintain dose.  25.5mg weekly.    2. Next INR: 2 weeks.  Can r/s in 4 weeks if INR is therapeutic next visit.      Education provided to patient during the visit:  Patient instructed to call in interim with questions, concerns and changes.   Patient educated on interactions between medications and warfarin.   Patient educated on dietary consistency in vitamin k consumption.   Patient educated on affects of alcohol consumption while taking warfarin.   Patient educated on signs of bleeding/clotting.   Patient educated on compliance with dosing, follow up appointments, and prescribed plan of care.

## 2024-04-09 DIAGNOSIS — I10 ESSENTIAL HYPERTENSION: ICD-10-CM

## 2024-04-09 RX ORDER — DILTIAZEM HYDROCHLORIDE 120 MG/1
120 CAPSULE, EXTENDED RELEASE ORAL DAILY
Qty: 90 CAPSULE | Refills: 0 | Status: SHIPPED | OUTPATIENT
Start: 2024-04-09 | End: 2024-10-06

## 2024-04-16 ENCOUNTER — ANTICOAGULATION - WARFARIN VISIT (OUTPATIENT)
Dept: CARDIOLOGY | Facility: CLINIC | Age: 64
End: 2024-04-16
Payer: COMMERCIAL

## 2024-04-16 DIAGNOSIS — I74.9 ARTERIAL EMBOLISM (MULTI): Primary | ICD-10-CM

## 2024-04-16 LAB
POC INR: 5.6 (ref 2–3)
POC INR: 5.6 (ref 2–3)
POC PROTHROMBIN TIME: ABNORMAL
POC PROTHROMBIN TIME: ABNORMAL

## 2024-04-16 PROCEDURE — 99211 OFF/OP EST MAY X REQ PHY/QHP: CPT

## 2024-04-16 PROCEDURE — 85610 PROTHROMBIN TIME: CPT | Mod: QW | Performed by: INTERNAL MEDICINE

## 2024-04-16 PROCEDURE — 85610 PROTHROMBIN TIME: CPT

## 2024-04-16 NOTE — PROGRESS NOTES
Patient identification verified with 2 identifiers.    Location: [facility] - [address] [place] [id number] [phone]      Referring Physician: Dr. Stephan Martins  Enrollment/ Re-enrollment date: 2024   INR Goal: 2.0-3.0  INR monitoring is per [facility] protocol.  Anticoagulation Medication: warfarin  Indication: Arterial Embolism       Subjective   Bleeding signs/symptoms: No    Bruising: No   Major bleeding event: No  Thrombosis signs/symptoms: No  Thromboembolic event: No  Missed doses: No.    Extra doses: No  Medication changes: Yes.  Pt is now on Cymbalta 60mg Daily, which should be her max dose and Wellbutrin 150mg daily from 300mg daily.  Dietary changes: No.  Pt denies.  Change in health: No.  Pt denies.  Change in activity: No  Alcohol: No  Other concerns: No    Upcoming Procedures:  Does the Patient Have any upcoming procedures that require interruption in anticoagulation therapy? no  Does the patient require bridging? no    Dose maintained after last visit.  Pt is taking 25.5mg of warfarin weekly.  Anticoagulation Summary  As of 2024      INR goal:  2.0-3.0   TTR:  51.5% (6.5 mo)   INR used for dosin.60 (2024)   Weekly warfarin total:  21 mg               Assessment/Plan     Supratherapeutic     1. New dose:  Will have Pt hold Two doses of warfarin on  & 24 and Decrease weekly dose by approx. 17%.  25.5mg of warfarin weekly Decreased to 21mg weekly, 3mg daily.  2. Next INR:  2 days.  Can r/s in 3-7 days if INR is therapeutic next visit.  3. Pt instructed to visit the ER with any S/Sx of bleeding, bruising, stroke, trauma or head injury.  Pt agreed and verbalized understanding.  4. Secure Chat sent to Dr. Martins after visit to report INR result off 5.6 from today.  POC discussed and agreed.    BS: Good afternoon. This is Carter, RN at [place] [facility]. I saw RONNIE Martins this afternoon in clinic and her INR was 5.6. Pt reports some small  bruises but no other S/Sx of bleeding. I believe the cause is the Cymbalta that she was started on. My plan was to have her hold two doses of warfarin on 4/16 & 4/17 with repeat INR on Thurs. We will initiate warfarin therapy again when INR is <3.0. I was planning on dropping her weekly dose of warfarin to 21mg, down from 25.5mg. Let me know if you agree or if you would like to do anything different. Kind regardsCarter.     JS: I agree with your plan! Thank you for working with her and her Cymbalta, especially since she cannot do without this medication right now. Would you like me to order a CBC just to be sure? Again, thanks.     Me: Thank you. No CBC at this time or Coag Screen needed. Her INRs are normally within range. The only change has been the Cymbalta. We will get her straight. Kind regardsCarter.     JS: :)     Education provided to patient during the visit:  Patient instructed to call in interim with questions, concerns and changes.   Patient educated on interactions between medications and warfarin.   Patient educated on dietary consistency in vitamin k consumption.   Patient educated on affects of alcohol consumption while taking warfarin.   Patient educated on signs of bleeding/clotting.   Patient educated on compliance with dosing, follow up appointments, and prescribed plan of care.

## 2024-04-18 ENCOUNTER — ANTICOAGULATION - WARFARIN VISIT (OUTPATIENT)
Dept: CARDIOLOGY | Facility: CLINIC | Age: 64
End: 2024-04-18
Payer: COMMERCIAL

## 2024-04-18 DIAGNOSIS — I74.9 ARTERIAL EMBOLISM (MULTI): Primary | ICD-10-CM

## 2024-04-18 LAB
POC INR: 3.7 (ref 2–3)
POC PROTHROMBIN TIME: ABNORMAL

## 2024-04-18 PROCEDURE — 85610 PROTHROMBIN TIME: CPT | Mod: QW | Performed by: INTERNAL MEDICINE

## 2024-04-18 PROCEDURE — 85610 PROTHROMBIN TIME: CPT

## 2024-04-18 NOTE — PROGRESS NOTES
Patient identification verified with 2 identifiers.    Location: [facility] - [address] [place] [id number] [phone]      Referring Physician: Dr. Stephan Martins  Enrollment/ Re-enrollment date: 4/21/2024   INR Goal: 2.0-3.0  INR monitoring is per [facility] protocol.  Anticoagulation Medication: warfarin  Indication: Arterial Embolism       Subjective   Bleeding signs/symptoms: No    Bruising: No   Major bleeding event: No  Thrombosis signs/symptoms: No  Thromboembolic event: No  Missed doses: Yes.  Pt held Two doses of warfarin on 4/16 & 4/17 as instructed.  Extra doses: No  Medication changes: Yes.  Pt is now on Cymbalta 60mg Daily, which should be her max dose and Wellbutrin 150mg daily from 300mg daily.  Pt stated that prior to last INR, she took two days worth of Pantoprazole for Acid Refux/heartburn.  Dietary changes: No.  Pt denies.  Change in health: No.  Pt denies.  Change in activity: No  Alcohol: No  Other concerns: No    Upcoming Procedures:  Does the Patient Have any upcoming procedures that require interruption in anticoagulation therapy? no  Does the patient require bridging? no    A Two day dose hold was done after last visit on 4/16.  Warfarin held on 4/16 and 4/17 and weekly dose Decreased by approx. 17%.  25.5mg of warfarin weekly Decreased to 21mg weekly.  Dr. Martins aware and agrees with POC.  Anticoagulation Summary  As of 4/18/2024      INR goal:  2.0-3.0   TTR:  51.0% (6.5 mo)   INR used for dosing:  3.70 (4/18/2024)   Weekly warfarin total:  21 mg               Assessment/Plan   Supratherapeutic     1. New dose: no change.  Maintain dose but will have Pt hold One dose of warfarin on 4/18/24.  21mg weekly.    2. Next INR: 1 week.  Can r/s in 2 weeks if INR is therapeutic next visit.        Education provided to patient during the visit:  Patient instructed to call in interim with questions, concerns and changes.   Patient educated on interactions between  medications and warfarin.   Patient educated on dietary consistency in vitamin k consumption.   Patient educated on affects of alcohol consumption while taking warfarin.   Patient educated on signs of bleeding/clotting.   Patient educated on compliance with dosing, follow up appointments, and prescribed plan of care.

## 2024-04-19 DIAGNOSIS — M54.16 LUMBAR RADICULAR PAIN: ICD-10-CM

## 2024-04-19 DIAGNOSIS — I73.9 PAD (PERIPHERAL ARTERY DISEASE) (CMS-HCC): ICD-10-CM

## 2024-04-19 RX ORDER — HYDROCODONE BITARTRATE AND ACETAMINOPHEN 5; 325 MG/1; MG/1
2 TABLET ORAL 2 TIMES DAILY PRN
Qty: 120 TABLET | Refills: 0 | Status: SHIPPED | OUTPATIENT
Start: 2024-04-19 | End: 2024-05-20 | Stop reason: SDUPTHER

## 2024-04-25 ENCOUNTER — ANTICOAGULATION - WARFARIN VISIT (OUTPATIENT)
Dept: CARDIOLOGY | Facility: CLINIC | Age: 64
End: 2024-04-25
Payer: COMMERCIAL

## 2024-04-25 DIAGNOSIS — I73.9 PAD (PERIPHERAL ARTERY DISEASE) (CMS-HCC): ICD-10-CM

## 2024-04-25 DIAGNOSIS — Z86.718 HISTORY OF DEEP VEIN THROMBOSIS: ICD-10-CM

## 2024-04-25 DIAGNOSIS — I74.9 ARTERIAL EMBOLISM (MULTI): Primary | ICD-10-CM

## 2024-04-25 LAB
POC INR: 2.8 (ref 2–3)
POC PROTHROMBIN TIME: NORMAL

## 2024-04-25 PROCEDURE — 85610 PROTHROMBIN TIME: CPT | Mod: QW | Performed by: INTERNAL MEDICINE

## 2024-04-25 PROCEDURE — 85610 PROTHROMBIN TIME: CPT

## 2024-04-25 PROCEDURE — 99211 OFF/OP EST MAY X REQ PHY/QHP: CPT

## 2024-04-25 NOTE — PROGRESS NOTES
Patient identification verified with 2 identifiers.    Location: [facility] - [address] [place] [address] [phone]      Referring Physician: Dr. Stephan Martins  Enrollment/ Re-enrollment date: 2024.  [facility] Physician Order for Renewal sent to Dr. Stephan Martins via Epic on 24.  INR Goal: 2.0-3.0  INR monitoring is per [facility] protocol.  Anticoagulation Medication: warfarin  Indication: Arterial Embolism, DVT and PAD       Subjective   Bleeding signs/symptoms: No    Bruising: No   Major bleeding event: No  Thrombosis signs/symptoms: No  Thromboembolic event: No  Missed doses: Yes.  Pt held One dose of warfarin on  as instructed.  Extra doses: No  Medication changes: No.  Pt is now on Cymbalta 60mg Daily, which should be her max dose and Wellbutrin 150mg daily from 300mg daily.  Pt stated that prior to last INR, she took two days worth of Pantoprazole for Acid Refux/heartburn.  Dietary changes: No.  Pt denies.  Change in health: No.  Pt denies.  Change in activity: No  Alcohol: No  Other concerns: No    Upcoming Procedures:  Does the Patient Have any upcoming procedures that require interruption in anticoagulation therapy? no  Does the patient require bridging? no    A One day dose hold was done after last visit on .  Pt's weekly dose of warfarin Decreased by approx. 17%.  25.5mg of warfarin weekly Decreased to 21mg weekly.    Anticoagulation Summary  As of 2024      INR goal:  2.0-3.0   TTR:  50.0% (6.8 mo)   INR used for dosin.80 (2024)   Weekly warfarin total:  21 mg               Assessment/Plan   Therapeutic     1. New dose: no change.  Maintain current dose.  21mg weekly.    2. Next INR: 1 week.  Can r/s in 2 weeks if INR is therapeutic next visit.        Education provided to patient during the visit:  Patient instructed to call in interim with questions, concerns and changes.   Patient educated on interactions between medications and warfarin.    Patient educated on dietary consistency in vitamin k consumption.   Patient educated on affects of alcohol consumption while taking warfarin.   Patient educated on signs of bleeding/clotting.   Patient educated on compliance with dosing, follow up appointments, and prescribed plan of care.

## 2024-04-26 ENCOUNTER — INFUSION (OUTPATIENT)
Dept: INFUSION THERAPY | Facility: CLINIC | Age: 64
End: 2024-04-26
Payer: COMMERCIAL

## 2024-04-26 VITALS
SYSTOLIC BLOOD PRESSURE: 147 MMHG | RESPIRATION RATE: 19 BRPM | OXYGEN SATURATION: 96 % | TEMPERATURE: 98.1 F | DIASTOLIC BLOOD PRESSURE: 69 MMHG | HEART RATE: 75 BPM

## 2024-04-26 DIAGNOSIS — U09.9 POST-COVID CHRONIC COUGH: ICD-10-CM

## 2024-04-26 DIAGNOSIS — R29.818 NEUROGENIC CLAUDICATION: ICD-10-CM

## 2024-04-26 DIAGNOSIS — R05.3 POST-COVID CHRONIC COUGH: ICD-10-CM

## 2024-04-26 DIAGNOSIS — M54.16 LUMBAR RADICULAR PAIN: Primary | ICD-10-CM

## 2024-04-26 DIAGNOSIS — M54.16 LUMBAR RADICULAR PAIN: ICD-10-CM

## 2024-04-26 PROCEDURE — 2500000004 HC RX 250 GENERAL PHARMACY W/ HCPCS (ALT 636 FOR OP/ED): Performed by: NURSE PRACTITIONER

## 2024-04-26 PROCEDURE — 2500000004 HC RX 250 GENERAL PHARMACY W/ HCPCS (ALT 636 FOR OP/ED)

## 2024-04-26 PROCEDURE — 96368 THER/DIAG CONCURRENT INF: CPT | Mod: INF

## 2024-04-26 PROCEDURE — 96365 THER/PROPH/DIAG IV INF INIT: CPT | Mod: INF

## 2024-04-26 PROCEDURE — 96375 TX/PRO/DX INJ NEW DRUG ADDON: CPT | Mod: INF

## 2024-04-26 PROCEDURE — 2500000005 HC RX 250 GENERAL PHARMACY W/O HCPCS: Performed by: NURSE PRACTITIONER

## 2024-04-26 RX ORDER — DULOXETIN HYDROCHLORIDE 60 MG/1
60 CAPSULE, DELAYED RELEASE ORAL DAILY
COMMUNITY

## 2024-04-26 RX ORDER — ONDANSETRON HYDROCHLORIDE 2 MG/ML
4 INJECTION, SOLUTION INTRAVENOUS ONCE
Status: CANCELLED | OUTPATIENT
Start: 2024-05-26 | End: 2024-05-26

## 2024-04-26 RX ORDER — KETOROLAC TROMETHAMINE 30 MG/ML
30 INJECTION, SOLUTION INTRAMUSCULAR; INTRAVENOUS ONCE
Status: COMPLETED | OUTPATIENT
Start: 2024-04-26 | End: 2024-04-26

## 2024-04-26 RX ORDER — NITROGLYCERIN 0.4 MG/1
0.4 TABLET SUBLINGUAL ONCE
Status: CANCELLED | OUTPATIENT
Start: 2024-05-26 | End: 2024-05-26

## 2024-04-26 RX ORDER — KETOROLAC TROMETHAMINE 30 MG/ML
30 INJECTION, SOLUTION INTRAMUSCULAR; INTRAVENOUS ONCE
Status: CANCELLED | OUTPATIENT
Start: 2024-05-26 | End: 2024-05-26

## 2024-04-26 RX ORDER — EPINEPHRINE 0.3 MG/.3ML
0.3 INJECTION SUBCUTANEOUS EVERY 5 MIN PRN
Status: CANCELLED | OUTPATIENT
Start: 2024-05-26

## 2024-04-26 RX ORDER — DIPHENHYDRAMINE HYDROCHLORIDE 50 MG/ML
50 INJECTION INTRAMUSCULAR; INTRAVENOUS AS NEEDED
Status: CANCELLED | OUTPATIENT
Start: 2024-05-26

## 2024-04-26 RX ORDER — FAMOTIDINE 10 MG/ML
20 INJECTION INTRAVENOUS ONCE AS NEEDED
Status: CANCELLED | OUTPATIENT
Start: 2024-05-26

## 2024-04-26 RX ORDER — ALBUTEROL SULFATE 0.83 MG/ML
3 SOLUTION RESPIRATORY (INHALATION) AS NEEDED
Status: CANCELLED | OUTPATIENT
Start: 2024-05-26

## 2024-04-26 RX ORDER — KETOROLAC TROMETHAMINE 30 MG/ML
INJECTION, SOLUTION INTRAMUSCULAR; INTRAVENOUS
Status: COMPLETED
Start: 2024-04-26 | End: 2024-04-26

## 2024-04-26 RX ADMIN — Medication: at 14:30

## 2024-04-26 RX ADMIN — PROPOFOL 100 MG: 10 INJECTION, EMULSION INTRAVENOUS at 14:30

## 2024-04-26 RX ADMIN — KETOROLAC TROMETHAMINE 30 MG: 30 INJECTION, SOLUTION INTRAMUSCULAR at 14:29

## 2024-04-26 RX ADMIN — KETOROLAC TROMETHAMINE 30 MG: 30 INJECTION, SOLUTION INTRAMUSCULAR; INTRAVENOUS at 14:29

## 2024-04-26 ASSESSMENT — ENCOUNTER SYMPTOMS
OCCASIONAL FEELINGS OF UNSTEADINESS: 0
DEPRESSION: 0
LOSS OF SENSATION IN FEET: 0

## 2024-04-26 ASSESSMENT — PAIN DESCRIPTION - DESCRIPTORS: DESCRIPTORS: ACHING;BURNING

## 2024-04-26 ASSESSMENT — PAIN SCALES - GENERAL: PAINLEVEL_OUTOF10: 2

## 2024-04-26 ASSESSMENT — PAIN - FUNCTIONAL ASSESSMENT: PAIN_FUNCTIONAL_ASSESSMENT: 0-10

## 2024-04-26 NOTE — PATIENT INSTRUCTIONS
[facility] OUTPATIENT CENTER      Pain Infusion Aftercare Instructions      1. It is normal to feel sedated, tired and low in energy after a pain infusion. DO NOT DRIVE, OPERATE ANY MACHINERY, OR MAKE ANY IMPORTANT DECISIONS FOR AT LEAST 24 HOURS AFTER THE INFUSION.     2. Call the pain center at [phone] with any problems, questions, or concerns.     3. Eat light after the infusion. If you feel queasy or sick to your stomach, laying down with your eyes closed may help. When you resume eating start with something mild like clear liquids, yogurt, applesauce, crackers, etc… Gradually advance to a regular diet.     4. Do not leave your house alone the evening of your pain infusion.     5. No alcohol or sedative medications, such as sleeping pills, for 24 hours after your pain infusion.     6. Resume all other prescribed medications unless directed otherwise by you physician.     7. If you have any medical emergencies, call 911 or go directly to the closest emergency room.

## 2024-04-26 NOTE — PROGRESS NOTES
S: Patient here for 14 opioid sparing pain infusion. Patient reports 65% reduction in pain after last infusion that lasted 3 weeks.    Purpose of pain infusion meds explained along with potential side effects.  Patient verbalized understanding.    B: Pain Issues    A: Patient currently has pain described on flow sheet documentation. Designated  is . Patient last ate solid food >4 hours ago, and had liquid >2 hours ago.    R: Plan; Obtain IV access, do patient risk assessment, and start opioid sparing infusion as ordered. Monitoring for S/S of adverse reactions.    1447: Patient is restful as of this time, tolerating pain infusion well.    1515:Post infusion teaching provided. Patient verbalized understanding. VSS, Patient states pain is Less rated 1/10 in calf and ankle. Will assist patient to waiting car via wheelchair.

## 2024-04-30 ENCOUNTER — APPOINTMENT (OUTPATIENT)
Dept: PRIMARY CARE | Facility: CLINIC | Age: 64
End: 2024-04-30
Payer: COMMERCIAL

## 2024-05-02 ENCOUNTER — ANTICOAGULATION - WARFARIN VISIT (OUTPATIENT)
Dept: CARDIOLOGY | Facility: CLINIC | Age: 64
End: 2024-05-02
Payer: MEDICARE

## 2024-05-02 DIAGNOSIS — Z86.718 HISTORY OF DEEP VEIN THROMBOSIS: ICD-10-CM

## 2024-05-02 DIAGNOSIS — I73.9 PAD (PERIPHERAL ARTERY DISEASE) (CMS-HCC): ICD-10-CM

## 2024-05-02 DIAGNOSIS — I74.9 ARTERIAL EMBOLISM (MULTI): Primary | ICD-10-CM

## 2024-05-02 LAB
POC INR: 2.6 (ref 2–3)
POC PROTHROMBIN TIME: NORMAL

## 2024-05-02 PROCEDURE — 85610 PROTHROMBIN TIME: CPT | Mod: QW | Performed by: INTERNAL MEDICINE

## 2024-05-02 PROCEDURE — 99211 OFF/OP EST MAY X REQ PHY/QHP: CPT

## 2024-05-02 NOTE — PROGRESS NOTES
Patient identification verified with 2 identifiers.    Location: [facility] - [address] [place] [address] [phone]      Referring Physician: Dr. Stephan Martins  Enrollment/ Re-enrollment date: 2024.  [facility] Physician Order for Renewal sent to Dr. Stephan Martins via Epic on 24.  INR Goal: 2.0-3.0  INR monitoring is per [facility] protocol.  Anticoagulation Medication: warfarin  Indication: Arterial Embolism, DVT and PAD       Subjective   Bleeding signs/symptoms: No    Bruising: No   Major bleeding event: No  Thrombosis signs/symptoms: No  Thromboembolic event: No  Missed doses: No.  Pt denies.  Extra doses: No  Medication changes: No.  Pt is now on Cymbalta 60mg Daily, which should be her max dose and Wellbutrin 150mg daily from 300mg daily.  Pt stated that prior to last INR, she took two days worth of Pantoprazole for Acid Refux/heartburn.  Dietary changes: No.  Pt denies.  Change in health: No.  Pt denies.  Change in activity: No  Alcohol: No.  Pt denies.  Other concerns: No    Upcoming Procedures:  Does the Patient Have any upcoming procedures that require interruption in anticoagulation therapy? no  Does the patient require bridging? no    Dose maintained after last visit.  Pt is taking 21mg of warfarin weekly.  Anticoagulation Summary  As of 2024      INR goal:  2.0-3.0   TTR:  51.7% (7 mo)   INR used for dosin.60 (2024)   Weekly warfarin total:  21 mg               Assessment/Plan   Therapeutic     1. New dose: no change.  Maintain current dose.  21mg weekly.    2. Next INR: 2 weeks.  Can r/s in 4 weeks if INR is therapeutic next visit.        Education provided to patient during the visit:  Patient instructed to call in interim with questions, concerns and changes.   Patient educated on interactions between medications and warfarin.   Patient educated on dietary consistency in vitamin k consumption.   Patient educated on affects of alcohol consumption while  taking warfarin.   Patient educated on signs of bleeding/clotting.   Patient educated on compliance with dosing, follow up appointments, and prescribed plan of care.

## 2024-05-10 DIAGNOSIS — M54.16 LUMBAR RADICULAR PAIN: Primary | ICD-10-CM

## 2024-05-10 DIAGNOSIS — R29.818 NEUROGENIC CLAUDICATION: ICD-10-CM

## 2024-05-10 DIAGNOSIS — R05.3 POST-COVID CHRONIC COUGH: ICD-10-CM

## 2024-05-10 DIAGNOSIS — U09.9 POST-COVID CHRONIC COUGH: ICD-10-CM

## 2024-05-10 RX ORDER — KETOROLAC TROMETHAMINE 30 MG/ML
30 INJECTION, SOLUTION INTRAMUSCULAR; INTRAVENOUS ONCE
Status: CANCELLED | OUTPATIENT
Start: 2024-05-28 | End: 2024-05-28

## 2024-05-16 ENCOUNTER — ANTICOAGULATION - WARFARIN VISIT (OUTPATIENT)
Dept: CARDIOLOGY | Facility: CLINIC | Age: 64
End: 2024-05-16
Payer: MEDICARE

## 2024-05-16 DIAGNOSIS — Z86.718 HISTORY OF DEEP VEIN THROMBOSIS: ICD-10-CM

## 2024-05-16 DIAGNOSIS — I73.9 PAD (PERIPHERAL ARTERY DISEASE) (CMS-HCC): ICD-10-CM

## 2024-05-16 DIAGNOSIS — I74.9 ARTERIAL EMBOLISM (MULTI): Primary | ICD-10-CM

## 2024-05-16 LAB
POC INR: 2.6 (ref 2–3)
POC PROTHROMBIN TIME: NORMAL

## 2024-05-16 PROCEDURE — 85610 PROTHROMBIN TIME: CPT | Mod: QW | Performed by: INTERNAL MEDICINE

## 2024-05-16 PROCEDURE — 99211 OFF/OP EST MAY X REQ PHY/QHP: CPT

## 2024-05-16 NOTE — PROGRESS NOTES
Patient identification verified with 2 identifiers.    Location: [facility] - [address] [place] [address] [phone]      Referring Physician: Dr. Stephan Martins  Enrollment/ Re-enrollment date: 2024.  [facility] Physician Order for Renewal sent to Dr. Stephan Martins via Epic on 24.  INR Goal: 2.0-3.0  INR monitoring is per [facility] protocol.  Anticoagulation Medication: warfarin  Indication: Arterial Embolism, DVT and PAD       Subjective   Bleeding signs/symptoms: No    Bruising: No   Major bleeding event: No  Thrombosis signs/symptoms: No  Thromboembolic event: No  Missed doses: No.  Pt denies.  Extra doses: No  Medication changes: No.  Pt is now on Cymbalta 60mg Daily, which should be her max dose and Wellbutrin 150mg daily from 300mg daily.  Pt stated that prior to last INR, she took two days worth of Pantoprazole for Acid Refux/heartburn.  Dietary changes: No.  Pt denies.  Change in health: No.  Pt denies.  Change in activity: No  Alcohol: No.  Pt denies.  Other concerns: No    Upcoming Procedures:  Does the Patient Have any upcoming procedures that require interruption in anticoagulation therapy? no  Does the patient require bridging? no    Dose maintained after last visit.  Pt is taking 21mg of warfarin weekly.  Anticoagulation Summary  As of 2024      INR goal:  2.0-3.0   TTR:  54.7% (7.5 mo)   INR used for dosin.60 (2024)   Weekly warfarin total:  21 mg               Assessment/Plan   Therapeutic     1. New dose: no change.  Maintain current dose.  21mg weekly.    2. Next INR: 1 month.  Can r/s in 4 weeks if INR is therapeutic next visit.        Education provided to patient during the visit:  Patient instructed to call in interim with questions, concerns and changes.   Patient educated on interactions between medications and warfarin.   Patient educated on dietary consistency in vitamin k consumption.   Patient educated on affects of alcohol consumption  while taking warfarin.   Patient educated on signs of bleeding/clotting.   Patient educated on compliance with dosing, follow up appointments, and prescribed plan of care.

## 2024-05-20 ENCOUNTER — TELEPHONE (OUTPATIENT)
Dept: PAIN MEDICINE | Facility: CLINIC | Age: 64
End: 2024-05-20
Payer: MEDICARE

## 2024-05-20 DIAGNOSIS — M54.16 LUMBAR RADICULAR PAIN: ICD-10-CM

## 2024-05-20 DIAGNOSIS — M25.561 RIGHT KNEE PAIN, UNSPECIFIED CHRONICITY: Primary | ICD-10-CM

## 2024-05-20 DIAGNOSIS — I73.9 PAD (PERIPHERAL ARTERY DISEASE) (CMS-HCC): ICD-10-CM

## 2024-05-20 RX ORDER — NALOXONE HYDROCHLORIDE 4 MG/.1ML
1 SPRAY NASAL AS NEEDED
Qty: 2 EACH | Refills: 0 | Status: SHIPPED | OUTPATIENT
Start: 2024-05-20

## 2024-05-20 RX ORDER — HYDROCODONE BITARTRATE AND ACETAMINOPHEN 5; 325 MG/1; MG/1
2 TABLET ORAL 2 TIMES DAILY PRN
Qty: 120 TABLET | Refills: 0 | Status: SHIPPED | OUTPATIENT
Start: 2024-05-21 | End: 2024-06-03 | Stop reason: SDUPTHER

## 2024-05-20 NOTE — PROGRESS NOTES
History of Present Illness  No chief complaint on file.      The patient presents with side: right knee pain for 1 year.  The patient complains of worsening pain over the past 1 year.  There is increasing difficulty with activities of daily living and significant disability related to the knee pain.  The patient endorses the following non-operative treatments: Rest, ice, elevation and Physical therapy.   There is increasing frustration with persistent pain and swelling and decreasing distance of ambulation.  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  She states her patella feels like it is snapping.  She has tenderness to palpation over the posterior thigh and hamstring tendon as well as the Pez anserine bursa she is on warfarin she is not diabetic.    Past Medical History:   Diagnosis Date    COVID-19     COVID    Personal history of other venous thrombosis and embolism     History of deep venous thrombosis       Medication Documentation Review Audit       Reviewed by Roc Voss RN (Registered Nurse) on 04/26/24 at 1411      Medication Order Taking? Sig Documenting Provider Last Dose Status   acetaminophen (Tylenol) 325 mg tablet [id number] Yes Take by mouth every 6 hours if needed. Historical Provider, MD Taking Active   alpha lipoic acid 600 mg capsule [id number] Yes 1 capsule once a day x7 days, then 1 capsule twice daily. Historical Provider, MD Taking Active   aspirin 81 mg EC tablet [id number] Yes Use as directed. Historical Provider, MD Taking Active   atorvastatin (Lipitor) 80 mg tablet [id number] Yes Take 1 tablet (80 mg) by mouth once daily at bedtime. Stephan Martins MD Taking Active   buPROPion XL (Wellbutrin XL) 300 mg 24 hr tablet [id number] Yes Please take 1 tablet by mouth with BREAKFAST every morning.  Do not crush, chew,  or split.   Patient taking differently: 150 mg. Please take 1 tablet by mouth with BREAKFAST every morning.  Do not crush, chew, or split.    Stephan Martins MD Taking Differently Active   cholecalciferol (Vitamin D-3) 1,250 mcg (50,000 unit) capsule [id number] Yes TAKE 1 CAPSULE Weekly SUNDAYS PLEASE with food and full glass water. Thank you Stephan Martins MD Taking Active   cyanocobalamin (Vitamin B-12) 100 mcg tablet [id number] Yes Please take 1 tablet by mouth with LUNCH every day.  Thank you. Stephan Martins MD Taking Active   dilTIAZem XR (Dilacor XR) 120 mg 24 hr capsule [id number] Yes Take 1 capsule (120 mg) by mouth once daily. Take 1 capsule once daily. Stephan Martins MD Taking Active   DULoxetine (Cymbalta) 60 mg DR capsule [id number] Yes Take 1 capsule (60 mg) by mouth once daily. Do not crush or chew. Historical Provider, MD Taking Active   folic acid (Folvite) 1 mg tablet [id number] Yes Please take 1 tablet by mouth with lunch every day.  Thank you. Stephan Martins MD Taking Active   gabapentin (Neurontin) 600 mg tablet [id number] Yes Take 1 tablet (600 mg) by mouth 3 times a day. Historical Provider, MD Taking Active   gabapentin (Neurontin) 600 mg tablet [id number] Yes Take 1 tablet (600 mg) by mouth 3 times a day. Jose Armando Velazquez MD Taking Active   HYDROcodone-acetaminophen (Norco) 5-325 mg tablet [id number] Yes Take 2 tablets by mouth 2 times a day as needed for severe pain (7 - 10). Paulette Tinoco MD Taking Active   ipratropium-albuteroL (Duo-Neb) 0.5-2.5 mg/3 mL nebulizer solution [id number] Yes Inhale 3 mL. As needed Historical Provider, MD Taking Active   ketamine HCl in 0.9 % NaCl (ketamine in 0.9 % sod chloride) 10 mg/mL solution [id number] Yes Infuse into a venous catheter. Historical Provider, MD Taking Active   melatonin 5 mg tablet [id number] Yes Take as directed. Historical Provider, MD Taking Active   warfarin (Coumadin) 3 mg tablet [id number] Yes Patient is  taking 1 1/2 tablets 4 days a week 1 tablets 3 days a week.  Per Patient 2024 Stephan Martins MD Taking Active                    Allergies   Allergen Reactions    Promethazine Other     Psychosis    Phenothiazines Anxiety, Other, Hallucinations and Rash     eCW Converted Reaction [id number]       Social History     Socioeconomic History    Marital status:      Spouse name: Not on file    Number of children: Not on file    Years of education: Not on file    Highest education level: Not on file   Occupational History    Not on file   Tobacco Use    Smoking status: Former     Current packs/day: 0.00     Types: Cigarettes     Quit date:      Years since quittin.4    Smokeless tobacco: Never   Substance and Sexual Activity    Alcohol use: Yes    Drug use: Never    Sexual activity: Not on file   Other Topics Concern    Not on file   Social History Narrative    Not on file     Social Determinants of Health     Financial Resource Strain: Not on file   Food Insecurity: No Food Insecurity (3/4/2024)    Hunger Vital Sign     Worried About Running Out of Food in the Last Year: Never true     Ran Out of Food in the Last Year: Never true   Transportation Needs: Not on file   Physical Activity: Not on file   Stress: Not on file   Social Connections: Not on file   Intimate Partner Violence: Not on file   Housing Stability: Unknown (3/27/2024)    Housing Stability Vital Sign     Unable to Pay for Housing in the Last Year: No     Number of Places Lived in the Last Year: Not on file     Unstable Housing in the Last Year: No       Past Surgical History:   Procedure Laterality Date    CT AORTA AND BILATERAL ILIOFEMORAL RUNOFF ANGIOGRAM W AND/OR WO IV CONTRAST  2022    CT AORTA AND BILATERAL ILIOFEMORAL RUNOFF ANGIOGRAM W AND/OR WO IV CONTRAST 2022 [facility]    CT AORTA AND BILATERAL ILIOFEMORAL RUNOFF ANGIOGRAM W AND/OR WO IV CONTRAST  2022    CT AORTA AND BILATERAL  ILIOFEMORAL RUNOFF ANGIOGRAM W AND/OR WO IV CONTRAST 12/13/2022 DOCTOR OFFICE [facility]    OTHER SURGICAL HISTORY  07/08/2022    Tubal ligation    OTHER SURGICAL HISTORY  07/08/2022    Shoulder surgery    OTHER SURGICAL HISTORY  07/08/2022    Gallbladder surgery       BMI  32    Pain is described as aching, sore, and tender     Location:  Posterior thigh around the hamstring tendon and anterior over the Pez anserine bursa  Pain level: 5  Assistive device: is using a cane  History of surgery: History of thrombectomy and fasciotomies       Review of Systems   GENERAL: Negative for malaise, significant weight loss, fever  MUSCULOSKELETAL: see HPI  NEURO:  Negative     Exam  side: right Knee:  Skin healthy and intact, well-healed surgical scars  No gross swelling or ecchymosis  Alignment: mild valgus    Correctable: full     Effusion: mild      ROM: 0 and 120  mild Crepitus with range of motion  Tenderness to palpation over medial and lateral joint line and with patellar compression      No laxity to valgus stress  No laxity to varus stress  Negative Lachman´s test  Negative posterior drawer test  negative Louis´s test  Logrolling of hip negative  No pain with internal rotation of the hip  Straight leg raise negative  Neurovascular exam normal distally  2+ DP pulse and good cap refill       Radiographs:  Right knee films today are negative for fracture, dislocation or destructive lesion.  There is vascular graft seen in the soft tissues.  There is some moderate degenerative change seen with joint space narrowing and spurring.    Vascular US lower extremity arterial duplex right             [facility]  [address] [place], [place] [address]             Phone [phone]       Vascular Lab Report     VASC US LOWER EXTREMITY ARTERIAL DUPLEX RIGHT    Patient Name:     STARLA FRANKS     Reading           [id number] Eric Sherman MD,                                       Physician:        ALYSHA  Study Date:        2/20/2024          [facility]          [id number]                                       Provider:  MRN/PID:          [id number]           Fellow:  Accession#:       [id number]       Technologist:     Ame Love RVT  Date of           1960 / 63      Technologist 2:  Birth/Age:        years  Gender:           F                  Encounter#:       [id number]  Admission Status: Outpatient         Location          [facility]                                       Performed:       Diagnosis/ICD: Peripheral vascular disease, unspecified-I73.9  CPT Codes:     80150 Peripheral artery Lower arterial Duplex limited       Pertinent History: Intervention of right lower extremity from                     12/08/2022-12/23/2022. Right superificial femoral artery                     angioplasty and stenting, popliteal and tibioperoneal trunk                     angioplasty, and four compartment fasciotomy.       CONCLUSIONS:  Right Lower Arterial: There is >50% stenosis documented at the popliteal artery. Known greater than 50% stenosis of the popliteal artery distal visualized on 11/13/2023. Anterior tibial artery was visualized in segments due to edema.  Stent: Lower Extremity Arterial - the stent located in the superficial femoral artery distal right is patent without stenosis.    Right SFA pre stent 56 cm/s  Right SFA proximal stent 51 cm/s  Right SFA mid stent 46 cm/s  Right SFA distal stent 64 cm/s  Right SFA distal to stent 87 cm/s.     Comparison:  Compared with study from 11/13/2023, no significant change.     Imaging & Doppler Findings:      Right                     Left    PSV                      PSV  188 cm/s        EIA  110 cm/s        CFA  110 cm/s Profunda Proximal  90 cm/s    SFA Proximal  74 cm/s       SFA Mid  327 cm/s     Popliteal  17 cm/s    ARPIT Proximal  31 cm/s       ARPIT Mid  19 cm/s     ARPIT Distal  85 cm/s  Peroneal Proximal  34 cm/s    Peroneal Mid  43 cm/s   Peroneal Distal  41  cm/s    PTA Proximal  43 cm/s       PTA Mid  41 cm/s     PTA Distal       [id number] Eric Sherman MD, ALYSHA  Electronically signed by [id number] Eric Sherman MD, ALYSHA on 2/22/2024 at 12:20:01 AM       ** Final **  MITZY without exercise             [address] [address]             Phone [phone]       Vascular Lab Report     [facility] US ANKLE BRACHIAL INDEX (MITZY) WITHOUT EXERCISE    Patient Name:     STARLA FRANKS     Reading           [id number] Eric Sherman MD,                                       Physician:        ALYSHA  Study Date:       2/20/2024          Ordering          [id number] [address]                                       Provider:  MRN/PID:          [id number]           Fellow:  Accession#:       [id number]       Technologist:     Ame Love RVT  Date of           1960 / 63      Technologist 2:  Birth/Age:        years  Gender:           F                  Encounter#:       [id number]  Admission Status: Outpatient         Location          [facility]                                       Performed:       Diagnosis/ICD: Peripheral vascular disease, unspecified-I73.9  CPT Codes:     89007 Peripheral artery MITZY Only       Pertinent History: Intervention of right lower extremity from                     12/08/2022-12/23/2022. Right superificial femoral artery                     angioplasty and stenting, popliteal and tibioperoneal trunk                     angioplasty, and four compartment fasciotomy.       CONCLUSIONS:  Right Lower PVR: Evidence of mild arterial occlusive disease in the right lower extremity at rest. Triphasic flow is noted in the right common femoral artery, right posterior tibial artery and right dorsalis pedis artery. TBI is within normal limits, however PPG tracing appears mildly dampened which may indicate vasospasm versus small vesssel disease.  Left Lower PVR: No evidence of arterial occlusive disease in the left lower extremity at  rest. Normal digital perfusion noted. Triphasic flow is noted in the left common femoral artery, left posterior tibial artery and left dorsalis pedis artery.     Comparison:  Compared with study from 11/13/2023, no significant change.     Imaging & Doppler Findings:     RIGHT Lower PVR                Pressures Ratios  Right Posterior Tibial (Ankle) 114 mmHg  0.90  Right Dorsalis Pedis (Ankle)   103 mmHg  0.82  Right Digit (Great Toe)        76 mmHg   0.60          LEFT Lower PVR                Pressures Ratios  Left Posterior Tibial (Ankle) 125 mmHg  0.99  Left Dorsalis Pedis (Ankle)   140 mmHg  1.11  Left Digit (Great Toe)        136 mmHg  1.08                             Right     Left  Brachial Pressure 125 mmHg 126 mmHg          [id number] Eric Sherman MD, RPZOEY  Electronically signed by [id number] Eric Sherman MD, RPVI on 2/22/2024 at 12:01:58 AM       ** Final **         Assessment  Right knee osteoarthritis       Plan  Given the fact that she is on warfarin she cannot take NSAIDs.  She has done pool therapy and is getting back into it.   L Inj/Asp: R knee on 5/21/2024 11:57 AM  Indications: pain  Details: 21 G needle, anterolateral approach  Medications: 1 mL lidocaine 10 mg/mL (1 %); 1 mL triamcinolone acetonide 40 mg/mL  Outcome: tolerated well, no immediate complications  Procedure, treatment alternatives, risks and benefits explained, specific risks discussed. Consent was given by the patient. Immediately prior to procedure a time out was called to verify the correct patient, procedure, equipment, support staff and site/side marked as required. Patient was prepped and draped in the usual sterile fashion.       Follow-up in 6 weeks for recheck, sooner if there is any problems  All questions were answered

## 2024-05-21 ENCOUNTER — HOSPITAL ENCOUNTER (OUTPATIENT)
Dept: RADIOLOGY | Facility: HOSPITAL | Age: 64
Discharge: HOME | End: 2024-05-21
Payer: COMMERCIAL

## 2024-05-21 ENCOUNTER — OFFICE VISIT (OUTPATIENT)
Dept: ORTHOPEDIC SURGERY | Facility: CLINIC | Age: 64
End: 2024-05-21
Payer: MEDICARE

## 2024-05-21 DIAGNOSIS — M17.11 PRIMARY OSTEOARTHRITIS OF RIGHT KNEE: ICD-10-CM

## 2024-05-21 DIAGNOSIS — M25.561 RIGHT KNEE PAIN, UNSPECIFIED CHRONICITY: ICD-10-CM

## 2024-05-21 DIAGNOSIS — M70.50 PES ANSERINE BURSITIS: ICD-10-CM

## 2024-05-21 DIAGNOSIS — M76.899 HAMSTRING TENDONITIS: Primary | ICD-10-CM

## 2024-05-21 PROCEDURE — 99214 OFFICE O/P EST MOD 30 MIN: CPT | Performed by: ORTHOPAEDIC SURGERY

## 2024-05-21 PROCEDURE — 3008F BODY MASS INDEX DOCD: CPT | Performed by: ORTHOPAEDIC SURGERY

## 2024-05-21 PROCEDURE — 73564 X-RAY EXAM KNEE 4 OR MORE: CPT | Mod: RT

## 2024-05-21 PROCEDURE — 20610 DRAIN/INJ JOINT/BURSA W/O US: CPT | Performed by: ORTHOPAEDIC SURGERY

## 2024-05-21 PROCEDURE — 73564 X-RAY EXAM KNEE 4 OR MORE: CPT | Mod: RIGHT SIDE | Performed by: RADIOLOGY

## 2024-05-21 RX ORDER — LIDOCAINE HYDROCHLORIDE 10 MG/ML
1 INJECTION INFILTRATION; PERINEURAL
Status: COMPLETED | OUTPATIENT
Start: 2024-05-21 | End: 2024-05-21

## 2024-05-21 RX ORDER — TRIAMCINOLONE ACETONIDE 40 MG/ML
1 INJECTION, SUSPENSION INTRA-ARTICULAR; INTRAMUSCULAR
Status: COMPLETED | OUTPATIENT
Start: 2024-05-21 | End: 2024-05-21

## 2024-05-21 RX ADMIN — TRIAMCINOLONE ACETONIDE 1 ML: 40 INJECTION, SUSPENSION INTRA-ARTICULAR; INTRAMUSCULAR at 11:57

## 2024-05-21 RX ADMIN — LIDOCAINE HYDROCHLORIDE 1 ML: 10 INJECTION INFILTRATION; PERINEURAL at 11:57

## 2024-05-28 ENCOUNTER — INFUSION (OUTPATIENT)
Dept: INFUSION THERAPY | Facility: CLINIC | Age: 64
End: 2024-05-28
Payer: MEDICARE

## 2024-05-28 VITALS
HEART RATE: 77 BPM | RESPIRATION RATE: 10 BRPM | SYSTOLIC BLOOD PRESSURE: 150 MMHG | TEMPERATURE: 97.3 F | DIASTOLIC BLOOD PRESSURE: 69 MMHG | OXYGEN SATURATION: 98 %

## 2024-05-28 DIAGNOSIS — R05.3 POST-COVID CHRONIC COUGH: ICD-10-CM

## 2024-05-28 DIAGNOSIS — U09.9 POST-COVID CHRONIC COUGH: ICD-10-CM

## 2024-05-28 DIAGNOSIS — R29.818 NEUROGENIC CLAUDICATION: ICD-10-CM

## 2024-05-28 DIAGNOSIS — M54.16 LUMBAR RADICULAR PAIN: ICD-10-CM

## 2024-05-28 PROCEDURE — 2500000005 HC RX 250 GENERAL PHARMACY W/O HCPCS: Performed by: NURSE PRACTITIONER

## 2024-05-28 PROCEDURE — 2500000004 HC RX 250 GENERAL PHARMACY W/ HCPCS (ALT 636 FOR OP/ED)

## 2024-05-28 PROCEDURE — 96368 THER/DIAG CONCURRENT INF: CPT | Mod: INF

## 2024-05-28 PROCEDURE — 96365 THER/PROPH/DIAG IV INF INIT: CPT | Mod: INF

## 2024-05-28 PROCEDURE — 2500000004 HC RX 250 GENERAL PHARMACY W/ HCPCS (ALT 636 FOR OP/ED): Performed by: NURSE PRACTITIONER

## 2024-05-28 PROCEDURE — 96375 TX/PRO/DX INJ NEW DRUG ADDON: CPT | Mod: INF

## 2024-05-28 RX ORDER — KETOROLAC TROMETHAMINE 30 MG/ML
INJECTION, SOLUTION INTRAMUSCULAR; INTRAVENOUS
Status: COMPLETED
Start: 2024-05-28 | End: 2024-05-28

## 2024-05-28 RX ORDER — KETOROLAC TROMETHAMINE 30 MG/ML
30 INJECTION, SOLUTION INTRAMUSCULAR; INTRAVENOUS ONCE
OUTPATIENT
Start: 2024-06-27 | End: 2024-06-27

## 2024-05-28 RX ORDER — ALBUTEROL SULFATE 0.83 MG/ML
3 SOLUTION RESPIRATORY (INHALATION) AS NEEDED
OUTPATIENT
Start: 2024-06-27

## 2024-05-28 RX ORDER — EPINEPHRINE 0.3 MG/.3ML
0.3 INJECTION SUBCUTANEOUS EVERY 5 MIN PRN
OUTPATIENT
Start: 2024-06-27

## 2024-05-28 RX ORDER — KETOROLAC TROMETHAMINE 30 MG/ML
30 INJECTION, SOLUTION INTRAMUSCULAR; INTRAVENOUS ONCE
Status: COMPLETED | OUTPATIENT
Start: 2024-05-28 | End: 2024-05-28

## 2024-05-28 RX ORDER — FAMOTIDINE 10 MG/ML
20 INJECTION INTRAVENOUS ONCE AS NEEDED
OUTPATIENT
Start: 2024-06-27

## 2024-05-28 RX ORDER — ONDANSETRON HYDROCHLORIDE 2 MG/ML
4 INJECTION, SOLUTION INTRAVENOUS ONCE
OUTPATIENT
Start: 2024-06-27 | End: 2024-06-27

## 2024-05-28 RX ORDER — DIPHENHYDRAMINE HYDROCHLORIDE 50 MG/ML
50 INJECTION INTRAMUSCULAR; INTRAVENOUS AS NEEDED
OUTPATIENT
Start: 2024-06-27

## 2024-05-28 RX ORDER — NITROGLYCERIN 0.4 MG/1
0.4 TABLET SUBLINGUAL ONCE
OUTPATIENT
Start: 2024-06-27 | End: 2024-06-27

## 2024-05-28 RX ADMIN — Medication: at 14:34

## 2024-05-28 RX ADMIN — PROPOFOL 100 MG: 10 INJECTION, EMULSION INTRAVENOUS at 14:34

## 2024-05-28 RX ADMIN — KETOROLAC TROMETHAMINE 30 MG: 30 INJECTION, SOLUTION INTRAMUSCULAR at 14:33

## 2024-05-28 RX ADMIN — KETOROLAC TROMETHAMINE 30 MG: 30 INJECTION, SOLUTION INTRAMUSCULAR; INTRAVENOUS at 14:33

## 2024-05-28 ASSESSMENT — PAIN - FUNCTIONAL ASSESSMENT
PAIN_FUNCTIONAL_ASSESSMENT: 0-10
PAIN_FUNCTIONAL_ASSESSMENT: 0-10

## 2024-05-28 ASSESSMENT — PAIN SCALES - GENERAL
PAINLEVEL_OUTOF10: 1
PAINLEVEL: 4
PAINLEVEL_OUTOF10: 4

## 2024-05-28 ASSESSMENT — ENCOUNTER SYMPTOMS
OCCASIONAL FEELINGS OF UNSTEADINESS: 0
LOSS OF SENSATION IN FEET: 0
DEPRESSION: 0

## 2024-05-28 NOTE — PROGRESS NOTES
S: Patient here for 15 opioid sparing pain infusion. Patient reports 65% reduction in pain after last infusion that lasted 3 weeks and 3 days.    Purpose of pain infusion meds explained along with potential side effects.  Patient verbalized understanding.     B: Pain Issues 4/10 right leg    A: Patient currently has pain described on flow sheet documentation. Designated  is Mic @ [phone]. Patient last ate solid food 4 hours ago, and had liquid 1 hours ago.    R: Plan; Obtain IV access, do patient risk assessment, and start opioid sparing infusion as ordered. Monitoring for S/S of adverse reactions.

## 2024-05-28 NOTE — PATIENT INSTRUCTIONS
Today :We administered (ketamine 30 mg, lidocaine (Xylocaine) 20 mg/mL (2 %) 300 mg in sodium chloride 0.9% 500 mL IV), propofol (Diprivan) 100 mg in dextrose 5% 25 mL, ketorolac, and ketorolac.     For:   1. Lumbar radicular pain    2. Neurogenic claudication    3. Post-COVID chronic cough         Your next appointment is due in:  4 weeks        Please read the  Medication Guide that was given to you and reviewed during todays visit.     (Tell all doctors including dentists that you are taking this medication)     Go to the emergency room or call 911 if:  -You have signs of allergic reaction:   -Rash, hives, itching.   -Swollen, blistered, peeling skin.   -Swelling of face, lips, mouth, tongue or throat.   -Tightness of chest, trouble breathing, swallowing or talking     Call your doctor:  - If IV / injection site gets red, warm, swollen, itchy or leaks fluid or pus.     (Leave dressing on your IV site for at least 2 hours and keep area clean and dry  - If you get sick or have symptoms of infection or are not feeling well for any reason.    (Wash your hands often, stay away from people who are sick)  - If you have side effects from your medication that do not go away or are bothersome.     (Refer to the teaching your nurse gave you for side effects to call your doctor about)    - Common side effects may include:  stuffy nose, headache, feeling tired, muscle aches, upset stomach  - Before receiving any vaccines     - Call [facility] at   If:  - You get sick, are on antibiotics, have had a recent vaccine, have surgery or dental work and your doctor wants your visit rescheduled.  - You need to cancel and reschedule your visit for any reason. Call at least 2 days before your visit if you need to cancel.   - Your insurance changes before your next visit.    (We will need to get approval from your new insurance. This can take up to two weeks.)     The [facility] is opened Monday thru  Friday. We are closed on weekends and holidays.   Voice mail will take your call if the center is closed. If you leave a message please allow 24 hours for a call back during weekdays. If you leave a message on a weekend/holiday, we will call you back the next business day.              [facility]      Pain Infusion Aftercare Instructions      1. It is normal to feel sedated, tired and low in energy after a pain infusion. DO NOT DRIVE, OPERATE ANY MACHINERY, OR MAKE ANY IMPORTANT DECISIONS FOR AT LEAST 24 HOURS AFTER THE INFUSION.     2. Call the pain center at [phone] with any problems, questions, or concerns.     3. Eat light after the infusion. If you feel queasy or sick to your stomach, laying down with your eyes closed may help. When you resume eating start with something mild like clear liquids, yogurt, applesauce, crackers, etc… Gradually advance to a regular diet.     4. Do not leave your house alone the evening of your pain infusion.     5. No alcohol or sedative medications, such as sleeping pills, for 24 hours after your pain infusion.     6. Resume all other prescribed medications unless directed otherwise by you physician.     7. If you have any medical emergencies, call 911 or go directly to the closest emergency room.

## 2024-06-03 ENCOUNTER — OFFICE VISIT (OUTPATIENT)
Dept: PAIN MEDICINE | Facility: CLINIC | Age: 64
End: 2024-06-03
Payer: MEDICARE

## 2024-06-03 VITALS
HEART RATE: 85 BPM | SYSTOLIC BLOOD PRESSURE: 124 MMHG | DIASTOLIC BLOOD PRESSURE: 83 MMHG | WEIGHT: 177 LBS | OXYGEN SATURATION: 97 % | BODY MASS INDEX: 29.49 KG/M2 | TEMPERATURE: 97.9 F | HEIGHT: 65 IN | RESPIRATION RATE: 18 BRPM

## 2024-06-03 DIAGNOSIS — M54.16 LUMBAR RADICULAR PAIN: ICD-10-CM

## 2024-06-03 DIAGNOSIS — I73.9 PAD (PERIPHERAL ARTERY DISEASE) (CMS-HCC): ICD-10-CM

## 2024-06-03 PROCEDURE — 3074F SYST BP LT 130 MM HG: CPT | Performed by: ANESTHESIOLOGY

## 2024-06-03 PROCEDURE — 1036F TOBACCO NON-USER: CPT | Performed by: ANESTHESIOLOGY

## 2024-06-03 PROCEDURE — 99213 OFFICE O/P EST LOW 20 MIN: CPT | Performed by: ANESTHESIOLOGY

## 2024-06-03 PROCEDURE — 3008F BODY MASS INDEX DOCD: CPT | Performed by: ANESTHESIOLOGY

## 2024-06-03 PROCEDURE — 3079F DIAST BP 80-89 MM HG: CPT | Performed by: ANESTHESIOLOGY

## 2024-06-03 RX ORDER — HYDROCODONE BITARTRATE AND ACETAMINOPHEN 5; 325 MG/1; MG/1
2 TABLET ORAL 2 TIMES DAILY PRN
Qty: 120 TABLET | Refills: 0 | Status: SHIPPED | OUTPATIENT
Start: 2024-06-21 | End: 2024-10-19

## 2024-06-03 SDOH — ECONOMIC STABILITY: FOOD INSECURITY: WITHIN THE PAST 12 MONTHS, YOU WORRIED THAT YOUR FOOD WOULD RUN OUT BEFORE YOU GOT MONEY TO BUY MORE.: NEVER TRUE

## 2024-06-03 SDOH — ECONOMIC STABILITY: FOOD INSECURITY: WITHIN THE PAST 12 MONTHS, THE FOOD YOU BOUGHT JUST DIDN'T LAST AND YOU DIDN'T HAVE MONEY TO GET MORE.: NEVER TRUE

## 2024-06-03 ASSESSMENT — ENCOUNTER SYMPTOMS
LOSS OF SENSATION IN FEET: 0
OCCASIONAL FEELINGS OF UNSTEADINESS: 0
EYE REDNESS: 0
DEPRESSION: 0
SHORTNESS OF BREATH: 0

## 2024-06-03 ASSESSMENT — LIFESTYLE VARIABLES
HOW OFTEN DO YOU HAVE A DRINK CONTAINING ALCOHOL: NEVER
AUDIT-C TOTAL SCORE: 0
HOW MANY STANDARD DRINKS CONTAINING ALCOHOL DO YOU HAVE ON A TYPICAL DAY: PATIENT DOES NOT DRINK
SKIP TO QUESTIONS 9-10: 1
HOW OFTEN DO YOU HAVE SIX OR MORE DRINKS ON ONE OCCASION: NEVER

## 2024-06-03 ASSESSMENT — PAIN SCALES - GENERAL
PAINLEVEL_OUTOF10: 1
PAINLEVEL: 1

## 2024-06-03 ASSESSMENT — PAIN DESCRIPTION - DESCRIPTORS: DESCRIPTORS: ACHING;RADIATING

## 2024-06-03 ASSESSMENT — PAIN - FUNCTIONAL ASSESSMENT: PAIN_FUNCTIONAL_ASSESSMENT: 0-10

## 2024-06-03 NOTE — PROGRESS NOTES
Chief Complain  Follow-up (For pain in right lower leg from knees toes/Currently take norco,gabapentin and ketamine infusions and they help)       History Of Present Illness  Angle Martins is a 64 y.o. female here for right lower  extremity pain. The patient rates the pain at 1  on a scale from 0-10.  The patient describes pain as aching, radiating.  The pain  is alleviated by medications prescribed pain medications.  Since the last visit the pain has stayed the same.  The patient denies any fever, chills, weight loss, bladder/bowel incontinence.       Past Medical History  She has a past medical history of COVID-19 and Personal history of other venous thrombosis and embolism.    Surgical History  She has a past surgical history that includes Other surgical history (07/08/2022); Other surgical history (07/08/2022); Other surgical history (07/08/2022); CT angio aorta and bilateral iliofemoral runoff w and or wo IV contrast (12/8/2022); and CT angio aorta and bilateral iliofemoral runoff w and or wo IV contrast (12/13/2022).     Social History  She reports that she quit smoking about 36 years ago. Her smoking use included cigarettes. She has never used smokeless tobacco. She reports current alcohol use. She reports that she does not use drugs.    Family History  No family history on file.     Allergies  Promethazine and Phenothiazines    Review of Systems  Review of Systems   HENT:  Negative for ear pain.    Eyes:  Negative for redness.   Respiratory:  Negative for shortness of breath.    Cardiovascular:  Negative for chest pain.   Psychiatric/Behavioral:  Negative for suicidal ideas.         Physical Exam  Physical Exam  HENT:      Head: Normocephalic.   Eyes:      Extraocular Movements: Extraocular movements intact.      Pupils: Pupils are equal, round, and reactive to light.   Pulmonary:      Effort: Pulmonary effort is normal.   Neurological:      Mental Status: She is alert and oriented to person, place, and  "time.   Psychiatric:         Mood and Affect: Mood normal.           Last Recorded Vitals  Blood pressure 124/83, pulse 85, temperature 36.6 °C (97.9 °F), resp. rate 18, height 1.638 m (5' 4.5\"), weight 80.3 kg (177 lb), SpO2 97%.       Assessment/Plan     Angle Martins is a 64 y.o. female here for follow-up of chronic right lower extremity pain.  She has been experiencing this pain since she developed compartment syndrome, and she is continuing to have this neuropathy pain.  Currently she is doing very well with combination of opiate sparing infusions as well as 20 mg of hydrocodone a day.  She denies any significant side effects.  No new neurological, constitutional symptoms.  I would continue with the current regimen.  I have personally reviewed the OARRS report.  I have considered the risks of abuse, dependence, addiction and diversion.    Jose Armando Velazquez MD  "

## 2024-06-13 ENCOUNTER — ANTICOAGULATION - WARFARIN VISIT (OUTPATIENT)
Dept: CARDIOLOGY | Facility: CLINIC | Age: 64
End: 2024-06-13
Payer: MEDICARE

## 2024-06-13 DIAGNOSIS — Z86.718 HISTORY OF DEEP VEIN THROMBOSIS: ICD-10-CM

## 2024-06-13 DIAGNOSIS — I73.9 PAD (PERIPHERAL ARTERY DISEASE) (CMS-HCC): ICD-10-CM

## 2024-06-13 DIAGNOSIS — I74.9 ARTERIAL EMBOLISM (MULTI): Primary | ICD-10-CM

## 2024-06-13 LAB
POC INR: 2.9
POC PROTHROMBIN TIME: NORMAL

## 2024-06-13 PROCEDURE — 85610 PROTHROMBIN TIME: CPT | Mod: QW

## 2024-06-13 PROCEDURE — 99211 OFF/OP EST MAY X REQ PHY/QHP: CPT

## 2024-06-13 NOTE — PROGRESS NOTES
Patient identification verified with 2 identifiers.    Location: [facility] - [address] [place] [address] [phone]     Referring Physician: Dr. Stephan Martins  Enrollment/ Re-enrollment date: 2024.  [facility] Physician Order for Renewal sent to Dr. Stephan Martins via Epic on 24 (MESSAGE SENT TO DR. MARTINS ON 24 TO SIGN RENEWAL)  INR Goal: 2.0-3.0  INR monitoring is per [facility] protocol.  Anticoagulation Medication: warfarin  Indication: Arterial Embolism, DVT and PAD    Subjective   Bleeding signs/symptoms: No  Bruising: No   Major bleeding event: No  Thrombosis signs/symptoms: No  Thromboembolic event: No  Missed doses: No  Extra doses: No  Medication changes: No  Dietary changes: No  Change in health: No  Change in activity: No  Alcohol: No  Other concerns: No    Upcoming Procedures:  Does the Patient Have any upcoming procedures that require interruption in anticoagulation therapy? no  Does the patient require bridging? no      Anticoagulation Summary  As of 2024      INR goal:  2.0-3.0   TTR:  59.7% (8.4 mo)   INR used for dosin.90 (2024)   Weekly warfarin total:  21 mg               Assessment/Plan   Therapeutic     1. New dose: no change    2. Next INR: 1 month      Education provided to patient during the visit:  Patient instructed to call in interim with questions, concerns and changes.   Patient educated on interactions between medications and warfarin.   Patient educated on dietary consistency in vitamin k consumption.   Patient educated on affects of alcohol consumption while taking warfarin.   Patient educated on signs of bleeding/clotting.   Patient educated on compliance with dosing, follow up appointments, and prescribed plan of care.         CONSTITUTIONAL: No fever, no chills, no fatigue  EYES: No eye redness, no visual changes  ENT: No ear pain, no sore throat  CARDIOVASCULAR: No chest pain, no palpitations  RESPIRATORY: No cough, no SOB  GI: No abdominal pain, no nausea, no vomiting, no constipation, no diarrhea  GENITOURINARY: No dysuria, no frequency, no hematuria  MUSCULOSKELETAL: No back pain, no joint pain, no myalgias  SKIN:  + rash,  + peripheral edema  NEURO: No headache, no confusion    ALL OTHER SYSTEMS NEGATIVE.

## 2024-06-14 ENCOUNTER — APPOINTMENT (OUTPATIENT)
Dept: PRIMARY CARE | Facility: CLINIC | Age: 64
End: 2024-06-14
Payer: MEDICARE

## 2024-06-17 ENCOUNTER — ANTICOAGULATION - WARFARIN VISIT (OUTPATIENT)
Dept: CARDIOLOGY | Facility: CLINIC | Age: 64
End: 2024-06-17
Payer: MEDICARE

## 2024-06-17 DIAGNOSIS — I73.9 PAD (PERIPHERAL ARTERY DISEASE) (CMS-HCC): ICD-10-CM

## 2024-06-17 DIAGNOSIS — I74.9 ARTERIAL EMBOLISM (MULTI): Primary | ICD-10-CM

## 2024-06-17 DIAGNOSIS — Z86.718 HISTORY OF DEEP VEIN THROMBOSIS: ICD-10-CM

## 2024-06-18 ENCOUNTER — PATIENT OUTREACH (OUTPATIENT)
Dept: CARE COORDINATION | Facility: CLINIC | Age: 64
End: 2024-06-18
Payer: MEDICARE

## 2024-06-18 ENCOUNTER — CLINICAL SUPPORT (OUTPATIENT)
Dept: EMERGENCY MEDICINE | Facility: HOSPITAL | Age: 64
DRG: 204 | End: 2024-06-18
Payer: MEDICARE

## 2024-06-18 ENCOUNTER — APPOINTMENT (OUTPATIENT)
Dept: RADIOLOGY | Facility: HOSPITAL | Age: 64
DRG: 204 | End: 2024-06-18
Payer: MEDICARE

## 2024-06-18 ENCOUNTER — HOSPITAL ENCOUNTER (INPATIENT)
Facility: HOSPITAL | Age: 64
LOS: 5 days | Discharge: HOME | End: 2024-06-23
Attending: EMERGENCY MEDICINE | Admitting: INTERNAL MEDICINE
Payer: MEDICARE

## 2024-06-18 DIAGNOSIS — I74.9 ARTERIAL EMBOLISM (MULTI): ICD-10-CM

## 2024-06-18 DIAGNOSIS — I82.411 ACUTE EMBOLISM AND THROMBOSIS OF RIGHT FEMORAL VEIN (MULTI): ICD-10-CM

## 2024-06-18 DIAGNOSIS — R04.2 HEMOPTYSIS: Primary | ICD-10-CM

## 2024-06-18 LAB
ABO GROUP (TYPE) IN BLOOD: NORMAL
ALBUMIN SERPL BCP-MCNC: 4.1 G/DL (ref 3.4–5)
ALP SERPL-CCNC: 159 U/L (ref 33–136)
ALT SERPL W P-5'-P-CCNC: 294 U/L (ref 7–45)
ANION GAP SERPL CALC-SCNC: 13 MMOL/L (ref 10–20)
ANTIBODY SCREEN: NORMAL
APPEARANCE UR: CLEAR
APTT PPP: 32 SECONDS (ref 27–38)
AST SERPL W P-5'-P-CCNC: 35 U/L (ref 9–39)
ATRIAL RATE: 91 BPM
BASOPHILS # BLD AUTO: 0.03 X10*3/UL (ref 0–0.1)
BASOPHILS NFR BLD AUTO: 0.4 %
BILIRUB SERPL-MCNC: 0.6 MG/DL (ref 0–1.2)
BILIRUB UR STRIP.AUTO-MCNC: NEGATIVE MG/DL
BUN SERPL-MCNC: 11 MG/DL (ref 6–23)
CALCIUM SERPL-MCNC: 9.7 MG/DL (ref 8.6–10.6)
CHLORIDE SERPL-SCNC: 108 MMOL/L (ref 98–107)
CO2 SERPL-SCNC: 24 MMOL/L (ref 21–32)
COLOR UR: NORMAL
CREAT SERPL-MCNC: 0.7 MG/DL (ref 0.5–1.05)
EGFRCR SERPLBLD CKD-EPI 2021: >90 ML/MIN/1.73M*2
EOSINOPHIL # BLD AUTO: 0.1 X10*3/UL (ref 0–0.7)
EOSINOPHIL NFR BLD AUTO: 1.5 %
ERYTHROCYTE [DISTWIDTH] IN BLOOD BY AUTOMATED COUNT: 13.2 % (ref 11.5–14.5)
EST. AVERAGE GLUCOSE BLD GHB EST-MCNC: 100 MG/DL
GLUCOSE SERPL-MCNC: 96 MG/DL (ref 74–99)
GLUCOSE UR STRIP.AUTO-MCNC: NORMAL MG/DL
HBA1C MFR BLD: 5.1 %
HCT VFR BLD AUTO: 38.2 % (ref 36–46)
HGB BLD-MCNC: 13 G/DL (ref 12–16)
IMM GRANULOCYTES # BLD AUTO: 0.01 X10*3/UL (ref 0–0.7)
IMM GRANULOCYTES NFR BLD AUTO: 0.1 % (ref 0–0.9)
INR PPP: 1.5 (ref 0.9–1.1)
KETONES UR STRIP.AUTO-MCNC: NEGATIVE MG/DL
LEUKOCYTE ESTERASE UR QL STRIP.AUTO: NEGATIVE
LYMPHOCYTES # BLD AUTO: 1.17 X10*3/UL (ref 1.2–4.8)
LYMPHOCYTES NFR BLD AUTO: 17.4 %
MCH RBC QN AUTO: 31.6 PG (ref 26–34)
MCHC RBC AUTO-ENTMCNC: 34 G/DL (ref 32–36)
MCV RBC AUTO: 93 FL (ref 80–100)
MONOCYTES # BLD AUTO: 0.47 X10*3/UL (ref 0.1–1)
MONOCYTES NFR BLD AUTO: 7 %
NEUTROPHILS # BLD AUTO: 4.96 X10*3/UL (ref 1.2–7.7)
NEUTROPHILS NFR BLD AUTO: 73.6 %
NITRITE UR QL STRIP.AUTO: NEGATIVE
NRBC BLD-RTO: 0 /100 WBCS (ref 0–0)
P AXIS: 16 DEGREES
P OFFSET: 210 MS
P ONSET: 157 MS
PH UR STRIP.AUTO: 7.5 [PH]
PLATELET # BLD AUTO: 198 X10*3/UL (ref 150–450)
POTASSIUM SERPL-SCNC: 4.3 MMOL/L (ref 3.5–5.3)
PR INTERVAL: 122 MS
PROT SERPL-MCNC: 6.7 G/DL (ref 6.4–8.2)
PROT UR STRIP.AUTO-MCNC: NEGATIVE MG/DL
PROTHROMBIN TIME: 16.4 SECONDS (ref 9.8–12.8)
Q ONSET: 218 MS
QRS COUNT: 15 BEATS
QRS DURATION: 82 MS
QT INTERVAL: 366 MS
QTC CALCULATION(BAZETT): 450 MS
QTC FREDERICIA: 420 MS
R AXIS: 8 DEGREES
RBC # BLD AUTO: 4.12 X10*6/UL (ref 4–5.2)
RBC # UR STRIP.AUTO: NEGATIVE /UL
RH FACTOR (ANTIGEN D): NORMAL
SODIUM SERPL-SCNC: 141 MMOL/L (ref 136–145)
SP GR UR STRIP.AUTO: 1.02
T AXIS: 29 DEGREES
T OFFSET: 401 MS
UROBILINOGEN UR STRIP.AUTO-MCNC: NORMAL MG/DL
VENTRICULAR RATE: 91 BPM
WBC # BLD AUTO: 6.7 X10*3/UL (ref 4.4–11.3)

## 2024-06-18 PROCEDURE — 93005 ELECTROCARDIOGRAM TRACING: CPT

## 2024-06-18 PROCEDURE — 36415 COLL VENOUS BLD VENIPUNCTURE: CPT | Performed by: EMERGENCY MEDICINE

## 2024-06-18 PROCEDURE — 71275 CT ANGIOGRAPHY CHEST: CPT

## 2024-06-18 PROCEDURE — 80053 COMPREHEN METABOLIC PANEL: CPT | Performed by: EMERGENCY MEDICINE

## 2024-06-18 PROCEDURE — 85025 COMPLETE CBC W/AUTO DIFF WBC: CPT | Performed by: EMERGENCY MEDICINE

## 2024-06-18 PROCEDURE — 86901 BLOOD TYPING SEROLOGIC RH(D): CPT | Performed by: EMERGENCY MEDICINE

## 2024-06-18 PROCEDURE — 1210000001 HC SEMI-PRIVATE ROOM DAILY

## 2024-06-18 PROCEDURE — 2500000001 HC RX 250 WO HCPCS SELF ADMINISTERED DRUGS (ALT 637 FOR MEDICARE OP)

## 2024-06-18 PROCEDURE — 82565 ASSAY OF CREATININE: CPT | Performed by: EMERGENCY MEDICINE

## 2024-06-18 PROCEDURE — 81003 URINALYSIS AUTO W/O SCOPE: CPT | Performed by: EMERGENCY MEDICINE

## 2024-06-18 PROCEDURE — 99285 EMERGENCY DEPT VISIT HI MDM: CPT | Performed by: EMERGENCY MEDICINE

## 2024-06-18 PROCEDURE — 2500000004 HC RX 250 GENERAL PHARMACY W/ HCPCS (ALT 636 FOR OP/ED)

## 2024-06-18 PROCEDURE — 93010 ELECTROCARDIOGRAM REPORT: CPT | Performed by: EMERGENCY MEDICINE

## 2024-06-18 PROCEDURE — 83036 HEMOGLOBIN GLYCOSYLATED A1C: CPT

## 2024-06-18 PROCEDURE — 71275 CT ANGIOGRAPHY CHEST: CPT | Performed by: RADIOLOGY

## 2024-06-18 PROCEDURE — 85730 THROMBOPLASTIN TIME PARTIAL: CPT | Performed by: EMERGENCY MEDICINE

## 2024-06-18 PROCEDURE — 96374 THER/PROPH/DIAG INJ IV PUSH: CPT

## 2024-06-18 PROCEDURE — 85610 PROTHROMBIN TIME: CPT | Performed by: EMERGENCY MEDICINE

## 2024-06-18 PROCEDURE — 2550000001 HC RX 255 CONTRASTS: Performed by: EMERGENCY MEDICINE

## 2024-06-18 PROCEDURE — 99285 EMERGENCY DEPT VISIT HI MDM: CPT | Mod: 25

## 2024-06-18 RX ORDER — PANTOPRAZOLE SODIUM 40 MG/1
40 TABLET, DELAYED RELEASE ORAL EVERY MORNING
Status: DISCONTINUED | OUTPATIENT
Start: 2024-06-19 | End: 2024-06-23 | Stop reason: HOSPADM

## 2024-06-18 RX ORDER — POLYETHYLENE GLYCOL 3350 17 G/17G
0.5 POWDER, FOR SOLUTION ORAL DAILY PRN
COMMUNITY

## 2024-06-18 RX ORDER — IPRATROPIUM BROMIDE AND ALBUTEROL SULFATE 2.5; .5 MG/3ML; MG/3ML
3 SOLUTION RESPIRATORY (INHALATION) EVERY 6 HOURS PRN
Status: DISCONTINUED | OUTPATIENT
Start: 2024-06-18 | End: 2024-06-23 | Stop reason: HOSPADM

## 2024-06-18 RX ORDER — BUPROPION HYDROCHLORIDE 150 MG/1
150 TABLET ORAL EVERY MORNING
Status: DISCONTINUED | OUTPATIENT
Start: 2024-06-19 | End: 2024-06-23 | Stop reason: HOSPADM

## 2024-06-18 RX ORDER — ACETAMINOPHEN 325 MG/1
650 TABLET ORAL ONCE
Status: COMPLETED | OUTPATIENT
Start: 2024-06-18 | End: 2024-06-18

## 2024-06-18 RX ORDER — DULOXETIN HYDROCHLORIDE 60 MG/1
60 CAPSULE, DELAYED RELEASE ORAL DAILY
Status: DISCONTINUED | OUTPATIENT
Start: 2024-06-19 | End: 2024-06-23 | Stop reason: HOSPADM

## 2024-06-18 RX ORDER — TRAZODONE HYDROCHLORIDE 50 MG/1
0.5 TABLET ORAL NIGHTLY PRN
COMMUNITY

## 2024-06-18 RX ORDER — PANTOPRAZOLE SODIUM 40 MG/1
40 TABLET, DELAYED RELEASE ORAL EVERY MORNING
COMMUNITY

## 2024-06-18 RX ORDER — DILTIAZEM HYDROCHLORIDE 120 MG/1
120 CAPSULE, COATED, EXTENDED RELEASE ORAL DAILY
Status: DISCONTINUED | OUTPATIENT
Start: 2024-06-19 | End: 2024-06-23 | Stop reason: HOSPADM

## 2024-06-18 RX ORDER — GABAPENTIN 600 MG/1
600 TABLET ORAL 3 TIMES DAILY
Status: DISCONTINUED | OUTPATIENT
Start: 2024-06-18 | End: 2024-06-23 | Stop reason: HOSPADM

## 2024-06-18 RX ORDER — ASPIRIN 325 MG
50000 TABLET, DELAYED RELEASE (ENTERIC COATED) ORAL
Status: DISCONTINUED | OUTPATIENT
Start: 2024-06-23 | End: 2024-06-23 | Stop reason: HOSPADM

## 2024-06-18 RX ORDER — FOLIC ACID 1 MG/1
1 TABLET ORAL DAILY
Status: DISCONTINUED | OUTPATIENT
Start: 2024-06-19 | End: 2024-06-23 | Stop reason: HOSPADM

## 2024-06-18 RX ORDER — ACETAMINOPHEN 500 MG
5 TABLET ORAL NIGHTLY PRN
Status: DISCONTINUED | OUTPATIENT
Start: 2024-06-18 | End: 2024-06-23 | Stop reason: HOSPADM

## 2024-06-18 RX ORDER — HYDROMORPHONE HYDROCHLORIDE 1 MG/ML
1 INJECTION, SOLUTION INTRAMUSCULAR; INTRAVENOUS; SUBCUTANEOUS ONCE
Status: COMPLETED | OUTPATIENT
Start: 2024-06-18 | End: 2024-06-18

## 2024-06-18 RX ORDER — PNV NO.95/FERROUS FUM/FOLIC AC 28MG-0.8MG
100 TABLET ORAL DAILY
Status: DISCONTINUED | OUTPATIENT
Start: 2024-06-19 | End: 2024-06-23 | Stop reason: HOSPADM

## 2024-06-18 RX ORDER — POLYETHYLENE GLYCOL 3350 17 G/17G
17 POWDER, FOR SOLUTION ORAL DAILY PRN
Status: DISCONTINUED | OUTPATIENT
Start: 2024-06-18 | End: 2024-06-23 | Stop reason: HOSPADM

## 2024-06-18 ASSESSMENT — PAIN SCALES - GENERAL: PAINLEVEL_OUTOF10: 7

## 2024-06-18 NOTE — ED TRIAGE NOTES
PT WAS ADMITTED FOR AN ACUTE LIVER INJURY AND WAS ADMITTING AND DISCHARGED FROM THE [facility] AFTER AN EVAN. PT HAS HX OF ARTERIAL CLOTS AND IS ON COUMADIN.     PT REPORTS TODAY FOR HEMOPTYSIS THAT STARTED THIS MORNING. PT REPORTS NEARLY 10 EPISODES OF COUGHING UP BLOOD WITH LARGE QUARTER SIZED CLOTS. PT ALSO ENDORSES DIZZINESS, FATIGUE AND SOB.

## 2024-06-18 NOTE — ED PROVIDER NOTES
CC: Coughing Up Blood     HPI:   Patient is a 64-year-old female with history of diabetes, DVT on Coumadin, hypertension, TIA, presenting to the emergency department today for hemoptysis.  Patient reports for the past 24 hours since her discharge from [facility], she has had 10 episodes of coughing up small amounts of blood.  Reports her INR was 7.1 at [facility] and last dose of Coumadin was on Saturday.  Was advised to hold off on taking this due to hypocoagulable state.  Starting this morning had progressively worsening chest tightness with mild associated shortness of breath.  No fevers, chills, or changes in urination/stool noted.    Recent [facility] hospitalization was notable for ischemic hepatopathy likely secondary to underlying genetic hypercoagulable disorder.  Reports her son, her sister, and multiple the people in her family all have chronic arterial clots of unknown origin.    Records Reviewed: Recent available ED and inpatient notes reviewed in EMR.    PMHx/PSHx:  Per HPI.   - has a past medical history of COVID-19 and Personal history of other venous thrombosis and embolism.  - has a past surgical history that includes Other surgical history (07/08/2022); Other surgical history (07/08/2022); Other surgical history (07/08/2022); CT angio aorta and bilateral iliofemoral runoff w and or wo IV contrast (12/8/2022); and CT angio aorta and bilateral iliofemoral runoff w and or wo IV contrast (12/13/2022).  - has Abnormal uterine bleeding (AUB); Anxiety disorder, unspecified; Depression with anxiety; Debility; Degenerative spondylolisthesis; Other disorder of circulatory system; History of deep vein thrombosis; Hyperglycemia; Pain of left shoulder region; Low back pain; Lumbar radicular pain; Neurogenic claudication; Opioid dependence in remission (Multi); Pneumonia due to SARS-associated coronavirus; Primary osteoarthritis of both shoulders; Rotator cuff tear; Sciatica; Superior glenoid labrum lesion of left shoulder;  Cerebral ischemia; Vitamin D deficiency; Mixed hyperlipidemia; Lower limb ischemia; Chronic pain syndrome; 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 (Multi); Anemia, folate deficiency; Folate-deficiency anemia; Post-COVID chronic cough; Non-traumatic rhabdomyolysis; Body mass index (BMI) 32.0-32.9, adult; Ataxic gait; COVID-19 virus infection; Microscopic hematuria; Major depressive disorder, recurrent episode, moderate degree (Multi); Morbid obesity (Multi); Alteration in vision; Amaurosis fugax of left eye; Arthritis; B12 deficiency; Cognitive changes; Complicated migraine; Deep venous thrombosis of upper extremity (Multi); Dysphasia; Facial numbness; Personal history of nicotine dependence; Hemorrhoids; History of COVID-19; Prsnl hx of TIA (TIA), and cereb infrc w/o resid deficits; Memory loss; Migraine without aura, not refractory; PAD (peripheral artery disease) (CMS-[facility]); Recurrent major depression in partial remission (CMS-[facility]); Visual impairment; Essential hypertension; Arterial embolism (Multi); SCC (squamous cell carcinoma); Postprocedural hematoma of a circulatory system organ or structure following other circulatory system procedure; Long term (current) use of aspirin; Gastro-esophageal reflux disease without esophagitis; Acute embolism and thrombosis of right femoral vein (Multi); [place] cardiac risk <10% in next 10 years; Class 1 obesity due to excess calories with serious comorbidity and body mass index (BMI) of 32.0 to 32.9 in adult; and Azotemia on their problem list.    Medications:  Current Outpatient Medications   Medication Instructions    acetaminophen (Tylenol) 325 mg tablet oral, Every 6 hours PRN    alpha lipoic acid 600 mg capsule 1 capsule once a day x7 days, then 1 capsule twice daily.    aspirin 81 mg EC tablet Use as directed.    atorvastatin (LIPITOR) 80 mg, oral, Nightly    buPROPion XL (Wellbutrin XL) 300 mg 24 hr tablet Please take 1 tablet by mouth with BREAKFAST  every morning.  Do not crush, chew, or split.    cholecalciferol (Vitamin D-3) 1,250 mcg (50,000 unit) capsule TAKE 1 CAPSULE Weekly SUNDAYS PLEASE with food and full glass water. Thank you    cyanocobalamin (Vitamin B-12) 100 mcg tablet Please take 1 tablet by mouth with LUNCH every day.  Thank you.    dilTIAZem XR (DILACOR XR) 120 mg, oral, Daily, Take 1 capsule once daily.    DULoxetine (CYMBALTA) 60 mg, oral, Daily, Do not crush or chew.    folic acid (Folvite) 1 mg tablet Please take 1 tablet by mouth with lunch every day.  Thank you.    gabapentin (NEURONTIN) 600 mg, oral, 3 times daily    gabapentin (NEURONTIN) 600 mg, oral, 3 times daily    [START ON 6/21/2024] HYDROcodone-acetaminophen (Norco) 5-325 mg tablet 2 tablets, oral, 2 times daily PRN    ipratropium-albuteroL (Duo-Neb) 0.5-2.5 mg/3 mL nebulizer solution 3 mL, inhalation, As needed     ketamine HCl in 0.9 % NaCl (ketamine in 0.9 % sod chloride) 10 mg/mL solution intravenous    melatonin 5 mg tablet Take as directed.    naloxone (NARCAN) 4 mg, nasal, As needed, May repeat every 2-3 minutes if needed, alternating nostrils, until medical assistance becomes available.    warfarin (Coumadin) 3 mg tablet Patient is taking 1 1/2 tablets 4 days a week 1 tablets 3 days a week.  Per Patient 1.23.2024        Allergies:  Promethazine and Phenothiazines    Social History:  - Tobacco:  reports that she quit smoking about 36 years ago. Her smoking use included cigarettes. She has never used smokeless tobacco.   - Alcohol:  reports current alcohol use.   - Illicit Drugs:  reports no history of drug use.     ROS:  Per HPI.     ???????????????????????????????????????????????????????????????  Triage Vitals:  T 37.1 °C (98.8 °F)  HR 88  /85  RR 18  O2 95 %      Physical Exam  Constitutional:       Appearance: Normal appearance.   HENT:      Head: Normocephalic and atraumatic.   Cardiovascular:      Rate and Rhythm: Normal rate and regular rhythm.      Heart  sounds: Normal heart sounds.   Pulmonary:      Effort: Pulmonary effort is normal.      Breath sounds: Normal breath sounds.   Abdominal:      Palpations: Abdomen is soft.      Tenderness: There is no abdominal tenderness.   Musculoskeletal:         General: Normal range of motion.      Comments: Lower extremities with surgical scars, no evidence of unilateral swelling, erythema, or signs of DVT today.  No tenderness to palpation.   Skin:     General: Skin is warm.   Neurological:      General: No focal deficit present.      Mental Status: She is alert and oriented to person, place, and time.               [place] Coma Scale Score: 15                  ???????????????????????????????????????????????????????????????    Results: Relevant laboratory and radiographic results were reviewed and independently interpreted by myself.  As necessary, they are commented on in the ED Course.    EKG: EKG interpreted by myself. Please see ED Course for full interpretation.    Medical Decision Making   Patient is a 64-year-old female presenting to the emergency department today for hemoptysis.  On arrival, vital signs within normal limits, afebrile for us.  On examination, patient appears otherwise clinically well. No ongoing hemoptysis or signs of DVT today. Airway stable. Given her ongoing hemoptysis and lack of anticoagulation today, I think it is likely that she has recurrence of blood clots in her lungs and will get a CT PE for definitive evaluation.  Will also get basic labs including CBC, CMP, coags, urinalysis for further evaluation.  Symptomatically treated with morphine and Tylenol here in the ED.    Update: CBC relatively unremarkable with a stable hemoglobin.  Chemistries showed alk phos elevated to 159 with  (downtrending from patient's recent hospital stay.  INR now down to 1.5. After Dilaudid and Tylenol patient feels symptomatically improved.  Patient signed out to oncoming provider with CT PE and final dispo  pending.      ED Course as of 06/18/24 1452   Tue Jun 18, 2024   1352 Regular rate, regular rhythm.  NV, QRS, QTc intervals within normal limits.  No ST elevations, depressions, or T wave inversions.  Normal sinus rhythm. [AS]      ED Course User Index  [AS] Nito Marin MD         Diagnoses as of 06/18/24 1452   Hemoptysis       Social Determinants Limiting Care:  None identified    Disposition:   Sign Out    Nito Marin MD  Emergency Medicine PGY2      Procedures ? SmartLinks last updated 6/18/2024 2:52 PM        Nito Marin MD  Resident  06/18/24 145

## 2024-06-18 NOTE — PROGRESS NOTES
Emergency Medicine Transition of Care Note.    I received Angle Martins in signout from Dr. Clark.  Please see the previous ED provider note for all HPI, PE and MDM up to the time of signout at 1500. This is in addition to the primary record.    In brief Angle Martins is an 64 y.o. female presenting for hemoptysis.  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.  Coumadin was held and restarted with last dose on Saturday.  Given patient's history of recurrent DVTs CT angio chest PE was ordered to rule out PE which is pending at this time.  Chief Complaint   Patient presents with    Coughing Up Blood     At the time of signout we were awaiting: CT angio PE results for dispo.    ED Course as of 06/18/24 2234 Tue Jun 18, 2024   1352 Regular rate, regular rhythm.  ND, QRS, QTc intervals within normal limits.  No ST elevations, depressions, or T wave inversions.  Normal sinus rhythm. [AS]   1648 Bibasilar and peripheral groundglass opacities atelectasis versus mild edema versus inflammatory alveolitis - less likely infectious  [KR]   2115 Patient desaturated into low 80s with ambulatory pulse ox. Will require admission for pulm consult and bridging back to Warfarin. Patient's primary physician updated and agrees with plan.     Anna Murray MD.   Emergency Medicine, PGY-3 [KR]      ED Course User Index  [AS] Nito Marin MD  [KR] Anna Murray MD         Diagnoses as of 06/18/24 2234   Hemoptysis       Medical Decision Making    CT scan shows bibasilar and peripheral groundglass opacities atelectasis versus mild edema versus inflammatory alveolitis. Discussed patient case with patient's cardiologist Dr. Stephani Quiroga who requested pulmonology evaluation regarding her alveolitis as warfarin alone unlikely to cause hemoptysis.  Also advised patient needs warfarin bridge due to leg/graft issues.  Ambulatory pulse ox performed by  nursing staff patient desaturated in the low 80s became tachycardic and tachypneic.  Patient's vitals normalized at rest.  Plan for admission to medicine for further evaluation and treatment of hemoptysis with alveolitis, exertional hypoxic respiratory failure, plan for bridge to warfarin.     Final diagnoses:   [R04.2] Hemoptysis           Procedure  Procedures    Jhony Llamas DO     Reviewed and approved by JHONY LLAMAS on 6/18/24 at 3:19 PM.

## 2024-06-18 NOTE — PROGRESS NOTES
Discharge Facility: [facility]   Discharge Diagnosis: Intractable abdominal pain   Admission Date: 06/14/2024    Discharge Date: 06/17/2024      PCP Appointment Date: Stephan Martins MD 6/19/24  Specialist Appointment Date: Cardiology 6/19/24  Ortho surg 7/2/24                                   Hospital Encounter and Summary: Linked       2 day follow up with PCP

## 2024-06-18 NOTE — ED NOTES
"Performed walking pulse ox on RA with Pt, Pt ambulated approximately 30 feet with RA Sat decrease to 84% and HR increase to 106 Pt c/o of feeling \"Cloudy and unable to focus\"  Pt was returned to bed Sat increased to 97% on RA and HR decreased to 92      Andreina Moreau RN  06/18/24 9358    "

## 2024-06-19 ENCOUNTER — APPOINTMENT (OUTPATIENT)
Dept: CARDIOLOGY | Facility: CLINIC | Age: 64
End: 2024-06-19
Payer: MEDICARE

## 2024-06-19 ENCOUNTER — APPOINTMENT (OUTPATIENT)
Dept: PRIMARY CARE | Facility: CLINIC | Age: 64
End: 2024-06-19
Payer: MEDICARE

## 2024-06-19 ENCOUNTER — TELEPHONE (OUTPATIENT)
Dept: CARDIOLOGY | Facility: CLINIC | Age: 64
End: 2024-06-19

## 2024-06-19 LAB
ALBUMIN SERPL BCP-MCNC: 3.6 G/DL (ref 3.4–5)
ALP SERPL-CCNC: 132 U/L (ref 33–136)
ALT SERPL W P-5'-P-CCNC: 209 U/L (ref 7–45)
ANION GAP SERPL CALC-SCNC: 10 MMOL/L (ref 10–20)
AST SERPL W P-5'-P-CCNC: 22 U/L (ref 9–39)
BASOPHILS # BLD AUTO: 0.03 X10*3/UL (ref 0–0.1)
BASOPHILS # BLD AUTO: 0.04 X10*3/UL (ref 0–0.1)
BASOPHILS NFR BLD AUTO: 0.5 %
BASOPHILS NFR BLD AUTO: 0.7 %
BILIRUB DIRECT SERPL-MCNC: 0.1 MG/DL (ref 0–0.3)
BILIRUB SERPL-MCNC: 0.5 MG/DL (ref 0–1.2)
BUN SERPL-MCNC: 10 MG/DL (ref 6–23)
CALCIUM SERPL-MCNC: 9.2 MG/DL (ref 8.6–10.6)
CHLORIDE SERPL-SCNC: 105 MMOL/L (ref 98–107)
CO2 SERPL-SCNC: 28 MMOL/L (ref 21–32)
CREAT SERPL-MCNC: 0.69 MG/DL (ref 0.5–1.05)
EGFRCR SERPLBLD CKD-EPI 2021: >90 ML/MIN/1.73M*2
EOSINOPHIL # BLD AUTO: 0.13 X10*3/UL (ref 0–0.7)
EOSINOPHIL # BLD AUTO: 0.15 X10*3/UL (ref 0–0.7)
EOSINOPHIL NFR BLD AUTO: 2 %
EOSINOPHIL NFR BLD AUTO: 2.6 %
ERYTHROCYTE [DISTWIDTH] IN BLOOD BY AUTOMATED COUNT: 13 % (ref 11.5–14.5)
ERYTHROCYTE [DISTWIDTH] IN BLOOD BY AUTOMATED COUNT: 13.2 % (ref 11.5–14.5)
GLUCOSE SERPL-MCNC: 90 MG/DL (ref 74–99)
HCT VFR BLD AUTO: 34.3 % (ref 36–46)
HCT VFR BLD AUTO: 34.4 % (ref 36–46)
HGB BLD-MCNC: 11.5 G/DL (ref 12–16)
HGB BLD-MCNC: 11.6 G/DL (ref 12–16)
IMM GRANULOCYTES # BLD AUTO: 0.01 X10*3/UL (ref 0–0.7)
IMM GRANULOCYTES # BLD AUTO: 0.01 X10*3/UL (ref 0–0.7)
IMM GRANULOCYTES NFR BLD AUTO: 0.2 % (ref 0–0.9)
IMM GRANULOCYTES NFR BLD AUTO: 0.2 % (ref 0–0.9)
INR PPP: 1.3 (ref 0.9–1.1)
LYMPHOCYTES # BLD AUTO: 1.63 X10*3/UL (ref 1.2–4.8)
LYMPHOCYTES # BLD AUTO: 1.83 X10*3/UL (ref 1.2–4.8)
LYMPHOCYTES NFR BLD AUTO: 25.5 %
LYMPHOCYTES NFR BLD AUTO: 31.3 %
MAGNESIUM SERPL-MCNC: 1.93 MG/DL (ref 1.6–2.4)
MCH RBC QN AUTO: 31.6 PG (ref 26–34)
MCH RBC QN AUTO: 32.2 PG (ref 26–34)
MCHC RBC AUTO-ENTMCNC: 33.5 G/DL (ref 32–36)
MCHC RBC AUTO-ENTMCNC: 33.7 G/DL (ref 32–36)
MCV RBC AUTO: 94 FL (ref 80–100)
MCV RBC AUTO: 96 FL (ref 80–100)
MONOCYTES # BLD AUTO: 0.57 X10*3/UL (ref 0.1–1)
MONOCYTES # BLD AUTO: 0.58 X10*3/UL (ref 0.1–1)
MONOCYTES NFR BLD AUTO: 8.9 %
MONOCYTES NFR BLD AUTO: 9.9 %
NEUTROPHILS # BLD AUTO: 3.23 X10*3/UL (ref 1.2–7.7)
NEUTROPHILS # BLD AUTO: 4.01 X10*3/UL (ref 1.2–7.7)
NEUTROPHILS NFR BLD AUTO: 55.3 %
NEUTROPHILS NFR BLD AUTO: 62.9 %
NRBC BLD-RTO: 0 /100 WBCS (ref 0–0)
NRBC BLD-RTO: 0 /100 WBCS (ref 0–0)
PHOSPHATE SERPL-MCNC: 3.2 MG/DL (ref 2.5–4.9)
PLATELET # BLD AUTO: 127 X10*3/UL (ref 150–450)
PLATELET # BLD AUTO: 208 X10*3/UL (ref 150–450)
POTASSIUM SERPL-SCNC: 3.8 MMOL/L (ref 3.5–5.3)
PROT SERPL-MCNC: 5.8 G/DL (ref 6.4–8.2)
PROTHROMBIN TIME: 14.7 SECONDS (ref 9.8–12.8)
RBC # BLD AUTO: 3.57 X10*6/UL (ref 4–5.2)
RBC # BLD AUTO: 3.67 X10*6/UL (ref 4–5.2)
SODIUM SERPL-SCNC: 139 MMOL/L (ref 136–145)
UFH PPP CHRO-ACNC: 0.5 IU/ML
UFH PPP CHRO-ACNC: 0.5 IU/ML
WBC # BLD AUTO: 5.8 X10*3/UL (ref 4.4–11.3)
WBC # BLD AUTO: 6.4 X10*3/UL (ref 4.4–11.3)

## 2024-06-19 PROCEDURE — 36415 COLL VENOUS BLD VENIPUNCTURE: CPT

## 2024-06-19 PROCEDURE — 85025 COMPLETE CBC W/AUTO DIFF WBC: CPT | Mod: 91

## 2024-06-19 PROCEDURE — 84100 ASSAY OF PHOSPHORUS: CPT

## 2024-06-19 PROCEDURE — 85025 COMPLETE CBC W/AUTO DIFF WBC: CPT

## 2024-06-19 PROCEDURE — 99222 1ST HOSP IP/OBS MODERATE 55: CPT | Performed by: INTERNAL MEDICINE

## 2024-06-19 PROCEDURE — 80076 HEPATIC FUNCTION PANEL: CPT

## 2024-06-19 PROCEDURE — 2500000004 HC RX 250 GENERAL PHARMACY W/ HCPCS (ALT 636 FOR OP/ED)

## 2024-06-19 PROCEDURE — 2500000001 HC RX 250 WO HCPCS SELF ADMINISTERED DRUGS (ALT 637 FOR MEDICARE OP)

## 2024-06-19 PROCEDURE — 82374 ASSAY BLOOD CARBON DIOXIDE: CPT

## 2024-06-19 PROCEDURE — 85610 PROTHROMBIN TIME: CPT

## 2024-06-19 PROCEDURE — 83735 ASSAY OF MAGNESIUM: CPT

## 2024-06-19 PROCEDURE — 99223 1ST HOSP IP/OBS HIGH 75: CPT

## 2024-06-19 PROCEDURE — 85520 HEPARIN ASSAY: CPT

## 2024-06-19 PROCEDURE — 1210000001 HC SEMI-PRIVATE ROOM DAILY

## 2024-06-19 RX ORDER — HYDROMORPHONE HYDROCHLORIDE 1 MG/ML
INJECTION, SOLUTION INTRAMUSCULAR; INTRAVENOUS; SUBCUTANEOUS
Status: COMPLETED
Start: 2024-06-19 | End: 2024-06-19

## 2024-06-19 RX ORDER — OXYCODONE HYDROCHLORIDE 10 MG/1
10 TABLET ORAL EVERY 12 HOURS PRN
Status: DISCONTINUED | OUTPATIENT
Start: 2024-06-19 | End: 2024-06-21

## 2024-06-19 RX ORDER — HYDROMORPHONE HYDROCHLORIDE 1 MG/ML
0.2 INJECTION, SOLUTION INTRAMUSCULAR; INTRAVENOUS; SUBCUTANEOUS ONCE
Status: COMPLETED | OUTPATIENT
Start: 2024-06-19 | End: 2024-06-19

## 2024-06-19 RX ORDER — HYDROMORPHONE HYDROCHLORIDE 1 MG/ML
1 INJECTION, SOLUTION INTRAMUSCULAR; INTRAVENOUS; SUBCUTANEOUS EVERY 12 HOURS PRN
Status: DISCONTINUED | OUTPATIENT
Start: 2024-06-19 | End: 2024-06-23 | Stop reason: HOSPADM

## 2024-06-19 RX ORDER — HEPARIN SODIUM 10000 [USP'U]/100ML
0-4000 INJECTION, SOLUTION INTRAVENOUS CONTINUOUS
Status: DISCONTINUED | OUTPATIENT
Start: 2024-06-19 | End: 2024-06-20

## 2024-06-19 ASSESSMENT — PAIN SCALES - GENERAL
PAINLEVEL_OUTOF10: 7
PAINLEVEL_OUTOF10: 8
PAINLEVEL_OUTOF10: 8
PAINLEVEL_OUTOF10: 0 - NO PAIN
PAINLEVEL_OUTOF10: 9

## 2024-06-19 ASSESSMENT — LIFESTYLE VARIABLES
HAVE PEOPLE ANNOYED YOU BY CRITICIZING YOUR DRINKING: NO
TOTAL SCORE: 0
EVER FELT BAD OR GUILTY ABOUT YOUR DRINKING: NO
HAVE YOU EVER FELT YOU SHOULD CUT DOWN ON YOUR DRINKING: NO
EVER HAD A DRINK FIRST THING IN THE MORNING TO STEADY YOUR NERVES TO GET RID OF A HANGOVER: NO

## 2024-06-19 ASSESSMENT — PAIN DESCRIPTION - LOCATION: LOCATION: ABDOMEN

## 2024-06-19 ASSESSMENT — PAIN DESCRIPTION - ORIENTATION: ORIENTATION: UPPER

## 2024-06-19 ASSESSMENT — PAIN - FUNCTIONAL ASSESSMENT
PAIN_FUNCTIONAL_ASSESSMENT: 0-10
PAIN_FUNCTIONAL_ASSESSMENT: 0-10

## 2024-06-19 NOTE — TELEPHONE ENCOUNTER
Pt called [facility] yesterday to advise that she was heading to the ER due to coughing up blood clots.  She is currently admitted at [facility].  Please follow up.

## 2024-06-19 NOTE — PROGRESS NOTES
Pharmacy Medication History Review    Angle Martins is a 64 y.o. female admitted for No Principal Problem: There is no principal problem currently on the Problem List. Pharmacy reviewed the patient's [id number] medications and allergies for accuracy.    The list below reflects the updated PTA list. Comments regarding how patient may be taking medications differently can be found in the Admit Orders Activity  Prior to Admission Medications   Prescriptions  Chart / Patient Reported?   DULoxetine (Cymbalta) 60 mg DR capsule  Yes   Sig: Take 1 capsule (60 mg) by mouth once daily. (Morning)   HYDROcodone-acetaminophen (Norco) 5-325 mg tablet  Yes   Sig: Take 2 tablets by mouth 2 times a day as needed   --> Last Fill 5/21/24, #120 / 30 day  --> ON HOLD per patient and 6/17/24 [facility] DC Summary   alpha lipoic acid 600 mg capsule  Yes   Sig: Take 1 capsule by mouth 2 times a day.   aspirin 81 mg EC tablet  Yes   Sig: Chew 1 tablet (81 mg) once daily in the morning.   atorvastatin (Lipitor) 80 mg tablet  Yes   Sig: Take 1 tablet (80 mg) by mouth once daily at bedtime.   Patient not taking: Reported on 6/18/2024  --> ON HOLD per patient and 6/17/24 [facility] DC Summary   buPROPion XL (Wellbutrin XL) 150 mg 24 hr tablet  Yes   Sig:Take 150 mg by mouth once daily in the morning. Daily  --> Last Fill 3/25/24, 30 day per Dispense Report (?) Pt states taking.    cholecalciferol (Vitamin D-3) 1,250 mcg (50,000 unit) capsule  Yes   Sig: TAKE 1 CAPSULE Weekly on SUNDAYS   cyanocobalamin (Vitamin B-12) 100 mcg tablet  Yes   Sig: Please take 1 tablet by mouth with LUNCH every day.     dilTIAZem XR (Dilacor XR) 120 mg 24 hr capsule  Yes   Sig: Take 1 capsule (120 mg) by mouth once daily. (Morning)   folic acid (Folvite) 1 mg tablet  Yes   Sig: Please take 1 tablet by mouth with lunch every day.     gabapentin (Neurontin) 600 mg tablet  No   Sig: Take 1 tablet (600 mg) by mouth 3 times a day.  --> Last Fill 5/2/24, #90 / 30 day. Pt  states scheduled.    ipratropium-albuteroL (Duo-Neb) 0.5-2.5 mg/3 mL nebulizer solution  Yes   Sig: Inhale 3 mL 3 times a day as needed.  --> From 24 [facility] Discharge Summary - no recent fill history found.    ketamine HCl in 0.9 % NaCl (ketamine in 0.9 % sod chloride) 10 mg/mL solution  Yes   Sig: Infuse into a venous catheter.  --> Monthly infusion - see Epic Chart Review for more information.   melatonin 5 mg tablet  Yes   Si tablet (5 mg) as needed at bedtime.   naloxone (Narcan) 4 mg/0.1 mL nasal spray  Yes   Sig: Administer 1 spray (4 mg) into affected nostril(s) if needed for   opioid reversal. May repeat every 2-3 minutes if needed,   alternating nostrils, until medical assistance becomes available.   traZODone (Desyrel) 50 mg tablet  Yes   Sig: Take 0.5 tablets (25 mg) by mouth as needed at bedtime.  --> From 24 [facility] Discharge Summary - no recent fill history found.   warfarin (Coumadin) 3 mg tablet  Yes   Sig: Take 1 tablet (3 mg) by mouth once daily.  --> ON HOLD per patient and 24 [facility] DC Summary      pantoprazole (ProtoNix) 40 mg EC tablet  Yes   Sig: Take 1 tablet (40 mg) by mouth once daily in the morning.   polyethylene glycol (Glycolax, Miralax) 17 gram packet  Yes   Sig: Take 8.5 g by mouth once daily as needed.              The list below reflects the updated allergy list. Please review each documented allergy for additional clarification and justification.  Allergies  Reviewed by Edwige Ball RN on 2024        Severity Reactions Comments    Promethazine Not Specified Other Psychosis    Phenothiazines Low Anxiety, Other, Hallucinations, Rash eCW Converted Reaction [id number]            Patient accepts M2B at discharge.   Local Pharmacy if Needed:  [facility], [phone]    Sources used to complete the med history include:  Patient Interview (awake and alert; good historian; recognition/confirmation with med name prompting)  Epic Dispense Report  (Pharmacy Fill History)  6/17/24 [facility] Discharge Summary (Medication List)  [facility] Clinical Summary (Active Medications)  Epic Ambulatory Med List /  Chart Review  [facility] (gabapentin, hydrocodone/APAP)    Below are additional concerns with the patient's PTA list:  See additional comments listed in above PTA Table underneath medications.     -----------------------------  Carter Peterson, Ander, [facility]  Transitions of Care Pharmacist  Medication reconciliation complete  Please reach out via [employer] Secure Chat for questions,   or if no response call Receptor or [place].  [facility]

## 2024-06-19 NOTE — PROGRESS NOTES
Subjective     Interval events:    No acute events overnight. This AM, the patient endorses having small streaks of blood clots in her sputum, but endorses that it is much improved compared to before. She states she feels lightheaded and dizzy when she coughs.        Objective     Vitals:  Vitals:    06/19/24 1219   BP: 136/68   Pulse: 84   Resp:    Temp: 36.6 °C (97.9 °F)   SpO2: 97%       No intake/output data recorded.  No intake/output data recorded.    Physical exam:  Constitutional: Well-developed female in no acute distress.  HEENT: Normocephalic, atraumatic. PERRL. EOMI. No cervical lymphadenopathy.  Respiratory: CTA bilaterally. No wheezes, rales, or rhonchi. Normal respiratory effort.  Cardiovascular: RRR. No murmurs, gallops, or rubs. No JVD. Radial pulses 2+.  Abdominal: Soft, nondistended, nontender to palpation. Bowel sounds present. No hepatosplenomegaly or masses. No CVA tenderness.  Neuro: CN II-XII intact. UE and LE strength 5/5 bilaterally and sensation intact. Normal FTN testing.  MSK: No LE edema bilaterally.  Skin: Warm, dry. No rashes or wounds.  Psych: Appropriate mood and affect.    Medications:  buPROPion XL, 150 mg, oral, q AM  [START ON 6/23/2024] cholecalciferol, 50,000 Units, oral, Every Sunday  cyanocobalamin, 100 mcg, oral, Daily  dilTIAZem CD, 120 mg, oral, Daily  DULoxetine, 60 mg, oral, Daily  folic acid, 1 mg, oral, Daily  gabapentin, 600 mg, oral, TID  pantoprazole, 40 mg, oral, q AM      heparin, 0-4,000 Units/hr, Last Rate: 1,200 Units/hr (06/19/24 1301)      PRN medications: HYDROmorphone, ipratropium-albuteroL, melatonin, oxyCODONE, polyethylene glycol    Labs:  Results for orders placed or performed during the hospital encounter of 06/18/24 (from the past 24 hour(s))   Urinalysis with Reflex Microscopic   Result Value Ref Range    Color, Urine Light-Yellow Light-Yellow, Yellow, Dark-Yellow    Appearance, Urine Clear Clear    Specific Gravity, Urine 1.016 1.005 - 1.035    pH,  Urine 7.5 5.0, 5.5, 6.0, 6.5, 7.0, 7.5, 8.0    Protein, Urine NEGATIVE NEGATIVE, 10 (TRACE), 20 (TRACE) mg/dL    Glucose, Urine Normal Normal mg/dL    Blood, Urine NEGATIVE NEGATIVE    Ketones, Urine NEGATIVE NEGATIVE mg/dL    Bilirubin, Urine NEGATIVE NEGATIVE    Urobilinogen, Urine Normal Normal mg/dL    Nitrite, Urine NEGATIVE NEGATIVE    Leukocyte Esterase, Urine NEGATIVE NEGATIVE   CBC and Auto Differential   Result Value Ref Range    WBC 6.4 4.4 - 11.3 x10*3/uL    nRBC 0.0 0.0 - 0.0 /100 WBCs    RBC 3.67 (L) 4.00 - 5.20 x10*6/uL    Hemoglobin 11.6 (L) 12.0 - 16.0 g/dL    Hematocrit 34.4 (L) 36.0 - 46.0 %    MCV 94 80 - 100 fL    MCH 31.6 26.0 - 34.0 pg    MCHC 33.7 32.0 - 36.0 g/dL    RDW 13.0 11.5 - 14.5 %    Platelets 127 (L) 150 - 450 x10*3/uL    Neutrophils % 62.9 40.0 - 80.0 %    Immature Granulocytes %, Automated 0.2 0.0 - 0.9 %    Lymphocytes % 25.5 13.0 - 44.0 %    Monocytes % 8.9 2.0 - 10.0 %    Eosinophils % 2.0 0.0 - 6.0 %    Basophils % 0.5 0.0 - 2.0 %    Neutrophils Absolute 4.01 1.20 - 7.70 x10*3/uL    Immature Granulocytes Absolute, Automated 0.01 0.00 - 0.70 x10*3/uL    Lymphocytes Absolute 1.63 1.20 - 4.80 x10*3/uL    Monocytes Absolute 0.57 0.10 - 1.00 x10*3/uL    Eosinophils Absolute 0.13 0.00 - 0.70 x10*3/uL    Basophils Absolute 0.03 0.00 - 0.10 x10*3/uL   Hepatic function panel   Result Value Ref Range    Albumin 3.6 3.4 - 5.0 g/dL    Bilirubin, Total 0.5 0.0 - 1.2 mg/dL    Bilirubin, Direct 0.1 0.0 - 0.3 mg/dL    Alkaline Phosphatase 132 33 - 136 U/L     (H) 7 - 45 U/L    AST 22 9 - 39 U/L    Total Protein 5.8 (L) 6.4 - 8.2 g/dL   Magnesium   Result Value Ref Range    Magnesium 1.93 1.60 - 2.40 mg/dL   Phosphorus   Result Value Ref Range    Phosphorus 3.2 2.5 - 4.9 mg/dL   Basic Metabolic Panel   Result Value Ref Range    Glucose 90 74 - 99 mg/dL    Sodium 139 136 - 145 mmol/L    Potassium 3.8 3.5 - 5.3 mmol/L    Chloride 105 98 - 107 mmol/L    Bicarbonate 28 21 - 32 mmol/L     Anion Gap 10 10 - 20 mmol/L    Urea Nitrogen 10 6 - 23 mg/dL    Creatinine 0.69 0.50 - 1.05 mg/dL    eGFR >90 >60 mL/min/1.73m*2    Calcium 9.2 8.6 - 10.6 mg/dL   Protime-INR   Result Value Ref Range    Protime 14.7 (H) 9.8 - 12.8 seconds    INR 1.3 (H) 0.9 - 1.1       Imaging:  [unfilled]         Assessment and plan:  Angle Martins is a 64 y.o. 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 She has been hemodynamically stable at rest but still hypoxic on walking. Her Hgb is stable (13)  with INR 1.5 and has had 3 more episodes here that amount seems to be decreased. We'll continue to monitor her respiration and keep holding warfarin/aspirin.     #Hemoptysis i/s/o elevated INR i/s/o ischemic hepatitis  :: stable at rest but still hypoxic (85%) on exertion  :: INR 1.5 ([facility]; 6.9 (6/17)<7.2 (6/16)<6.7 (6/15; last dose of warfarin)<2.6 (6/14))  :: stable Hgb (13)  -keep holding warfarin, held aspirin  -continue to monitor respiration  - Vascular medicine consult  - Will trial AC with low intensity heparin gtt today (no bolus)  - PM CBC   -daily CBC/INR     #Hx Arterial thromboembolism in RLE s/p RLE thrombectomy, angioplasty and stenting of the right SFA, AK (Above knee)-pop and TP (tibioperoneal) trunk angioplasty with 4 compartment fasciotomy in 12/2022  #Hx of RUE thromboembolism 2004 s/p embolectomy in [place] ([place])   :: follows with Dr. Munson from  vascular medicine/surgery  :: h/o HCY elevation, +elevated lipoprotein (A) x 1 (other check was normal); APLA - normal; VERA negative. ECHO - no PFO - fairly normal  -as above     #Ischemic hepatitis improving  :: viral hepatitis panel (A, B, C)  neg  :: Although APCT with contrast was neg for infarction, clinical feature (elevated LDH, sudden intractable RUQ pain) was consistent with liver infarction  -continue to monitor hepatic function including INR     F: bolus prn  E: replete prn  N: regular  GI: pantoprazole 40  DVT: holding warfarin/aspirin, on SCD  A: PIVs  FULL code (confirmed on admission), Mic (Spouse) [phone]    Patient and plan discussed with attending physician Dr. Medina.    Emiliana Ramsey MD  PGY-1 Internal Medicine

## 2024-06-19 NOTE — CONSULTS
Consults  History Of Present Illness:    Angle Martins is a 64 y.o. 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 She was recently hospitalized for intractable RUQ abdominal pain and was found to have elevated liver enzyme (1379/1367) and LDH c/f liver infarction given her Hx of thromboembolism which has improved with conservative management and she was discharged 6/17. During this hospital stay, her INR was elevated to 7.2 (6/16) and last dose of warfarin was Saturday. After discharge, she was doing well but started having cough with lots of blood clots and some red blood coming out (about half cup in total) when she woke up this morning, which brought her in today. She also endorses SOB on exertion, some chest wall burning sensation, and some RUQ pain but denies any other systemic symptoms including fever, chills, palpitation, nausea, vomiting, any changes in urinary or bowel habits, any swelling of extremities.  Patient denies any history suggestive of infection no symptoms suggestive of vasculitis denies any history of trauma no history of drug use or abuse no history of e-cigarettes or vaping     Last Recorded Vitals:  Vitals:    06/19/24 0600 06/19/24 1054 06/19/24 1219 06/19/24 1639   BP: 125/57 110/70 136/68 (!) 145/94   BP Location:  Left arm     Patient Position:  Sitting     Pulse: 74 81 84 94   Resp: 16 16     Temp:   36.6 °C (97.9 °F)    TempSrc:   Temporal    SpO2: 94% 98% 97% 92%   Weight:       Height:           Last Labs:  CBC - 6/19/2024:  4:33 AM  6.4 11.6 127    34.4      CMP - 6/19/2024:  4:33 AM  9.2 5.8 22 --- 0.5   3.2 3.6 209 132      PTT - 6/18/2024:  1:15 PM  1.3   14.7 32  "    Hemoglobin A1C   Date/Time Value Ref Range Status   06/18/2024 01:15 PM 5.1 see below % Final   02/01/2024 02:16 PM 5.5 see below % Final     LDL Calculated   Date/Time Value Ref Range Status   02/01/2024 02:16  (H) <=99 mg/dL Final     Comment:                                 Near   Borderline      AGE      Desirable  Optimal    High     High     Very High     0-19 Y     0 - 109     ---    110-129   >/= 130     ----    20-24 Y     0 - 119     ---    120-159   >/= 160     ----      >24 Y     0 -  99   100-129  130-159   160-189     >/=190       VLDL   Date/Time Value Ref Range Status   02/01/2024 02:16 PM 15 0 - 40 mg/dL Final   06/22/2023 10:59 AM 19 0 - 40 mg/dL Final   12/10/2022 02:04 AM 20 0 - 40 mg/dL Final   11/16/2022 10:29 AM 23 0 - 40 mg/dL Final      Last I/O:  No intake/output data recorded.    Past Cardiology Tests (Last 3 Years):  EKG:  ECG 12 lead 06/18/2024    Echo:  No results found for this or any previous visit from the past 1095 days.    Ejection Fractions:  No results found for: \"EF\"  Cath:  No results found for this or any previous visit from the past 1095 days.    Stress Test:  No results found for this or any previous visit from the past 1095 days.    Cardiac Imaging:  MR cardiac morphology and function w and wo IV contrast 12/13/2022      Past Medical History:  She has a past medical history of COVID-19 and Personal history of other venous thrombosis and embolism.    Past Surgical History:  She has a past surgical history that includes Other surgical history (07/08/2022); Other surgical history (07/08/2022); Other surgical history (07/08/2022); CT angio aorta and bilateral iliofemoral runoff w and or wo IV contrast (12/8/2022); and CT angio aorta and bilateral iliofemoral runoff w and or wo IV contrast (12/13/2022).      Social History:  She reports that she quit smoking about 36 years ago. Her smoking use included cigarettes. She has never used smokeless tobacco. She reports " current alcohol use. She reports that she does not use drugs.    Family History:  No family history on file.     Allergies:  Promethazine and Phenothiazines    Inpatient Medications:  Scheduled medications   Medication Dose Route Frequency    buPROPion XL  150 mg oral q AM    [START ON 6/23/2024] cholecalciferol  50,000 Units oral Every Sunday    cyanocobalamin  100 mcg oral Daily    dilTIAZem CD  120 mg oral Daily    DULoxetine  60 mg oral Daily    folic acid  1 mg oral Daily    gabapentin  600 mg oral TID    pantoprazole  40 mg oral q AM     PRN medications   Medication    HYDROmorphone    ipratropium-albuteroL    melatonin    oxyCODONE    polyethylene glycol     Continuous Medications   Medication Dose Last Rate    heparin  0-4,000 Units/hr 1,200 Units/hr (06/19/24 1301)     Outpatient Medications:  Current Outpatient Medications   Medication Instructions    alpha lipoic acid 600 mg capsule 1 capsule, oral, 2 times daily    aspirin 81 mg, oral, Every morning    atorvastatin (LIPITOR) 80 mg, oral, Nightly    buPROPion XL (Wellbutrin XL) 300 mg 24 hr tablet Please take 1 tablet by mouth with BREAKFAST every morning.  Do not crush, chew, or split.    cholecalciferol (Vitamin D-3) 1,250 mcg (50,000 unit) capsule TAKE 1 CAPSULE Weekly SUNDAYS PLEASE with food and full glass water. Thank you    cyanocobalamin (Vitamin B-12) 100 mcg tablet Please take 1 tablet by mouth with LUNCH every day.  Thank you.    dilTIAZem XR (DILACOR XR) 120 mg, oral, Daily, Take 1 capsule once daily.    DULoxetine (CYMBALTA) 60 mg, oral, Daily, Do not crush or chew.    folic acid (Folvite) 1 mg tablet Please take 1 tablet by mouth with lunch every day.  Thank you.    gabapentin (NEURONTIN) 600 mg, oral, 3 times daily    [START ON 6/21/2024] HYDROcodone-acetaminophen (Norco) 5-325 mg tablet 2 tablets, oral, 2 times daily PRN    ipratropium-albuteroL (Duo-Neb) 0.5-2.5 mg/3 mL nebulizer solution 3 mL, inhalation, 3 times daily PRN    ketamine  HCl in 0.9 % NaCl (ketamine in 0.9 % sod chloride) 10 mg/mL solution intravenous    melatonin 5 mg tablet 1 tablet, Nightly PRN    naloxone (NARCAN) 4 mg, nasal, As needed, May repeat every 2-3 minutes if needed, alternating nostrils, until medical assistance becomes available.    pantoprazole (PROTONIX) 40 mg, oral, Every morning    polyethylene glycol (Glycolax, Miralax) 17 gram packet 0.5 packets, oral, Daily PRN    traZODone (Desyrel) 50 mg tablet 0.5 tablets, oral, Nightly PRN    warfarin (Coumadin) 3 mg tablet Patient is taking 1 1/2 tablets 4 days a week 1 tablets 3 days a week.  Per Patient 1.23.2024       Physical Exam:  Constitutional: Well-developed female in no acute distress.  HEENT: Normocephalic, atraumatic. PERRL. EOMI. No cervical lymphadenopathy.  Respiratory: CTA bilaterally. No wheezes, rales, or rhonchi. Normal respiratory effort.  Cardiovascular: RRR. No murmurs, gallops, or rubs. No JVD. Radial pulses 2+.  Abdominal: Soft, nondistended, nontender to palpation. Bowel sounds present. No hepatosplenomegaly or masses. No CVA tenderness.  Neuro: CN II-XII intact. UE and LE strength 5/5 bilaterally and sensation intact. Normal FTN testing.  MSK: No LE edema bilaterally.  Skin: Warm, dry. No rashes or wounds.  Psych: Appropriate mood and affect.     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  She has been hemodynamically stable at rest but still hypoxic on walking. Her Hgb is stable (13) with INR 1.5 and has had 3 more episodes here that amount seems to be decreased,  hemoptysis with wide range of differential diagnoses ,ranging from infective causes, autoimmune causes, and the presence of thrombocytopenia can be (ITP, TTP, HUS although nothing is supporting those diagnoses but we will keep monitoring the platelets and any other signs or symptoms), ruptured AV malformation specially in the presence of high INR secondary to Coumadin    Recommendations  1-Coumadin is on hold, currently patient is on heparin gtt., no bolus, low intensity  2-check CBCD, to monitor thrombocytopenia, check CMP, SPEP, VERA, ASMA, ANCA, APLA, ESR, C-reactive protein, antiglomerular basement membrane antibodies. PT. APTT.Dimer  3-will continue to monitor.  Peripheral IV 18 G Proximal;Right;Anterior Forearm (Active)   Site Assessment Clean;Intact;Dry 06/19/24 0617   Number of days: 1       Code Status:  Full Code    I spent 65 minutes in the professional and overall care of this patient.        Lucio Montez MD

## 2024-06-19 NOTE — H&P
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 She was recently hospitalized for intractable RUQ abdominal pain and was found to have elevated liver enzyme (1379/1367) and LDH c/f liver infarction given her Hx of thromboembolism which has improved with conservative management and she was discharged 6/17. During this hospital stay, her INR was elevated to 7.2 (6/16) and last dose of warfarin was Saturday. After discharge, she was doing well but started having cough with lots of blood clots and some red blood coming out (about half cup in total) when she woke up this morning, which brought her in today. She also endorses SOB on exertion, some chest wall burning sensation, and some RUQ pain but denies any other systemic symptoms including fever, chills, palpitation, nausea, vomiting, any changes in urinary or bowel habits, any swelling of extremities.  ED course:  VS: 121/85-88-18  37.1 C  SaO2 95% RA    Labs:  CBC  6.7/13/198k    INR 1.5 ([facility]; 6.9 (6/17)<7.2 (6/16)<6.7 (6/15; last dose of warfarin)<2.6 (6/14))  /4.3/108/24 11/0.7   LFT Alk Phos/AST/ALT/Tbil 159/35/294/0.6 (improving compared to recent lab OSH) Tpro/Alb  6.7/4.1    OSH LFTs at [place] ([facility])  ALT 1367 > 1241 > 655->799->655->425  AST 1379 > 1152 > 253->376->253->89   > 262 > 200->199->200->167  T bili 0.7 > 0.5 > 0.6->0.5->0.6->0.4    UA negative    Imaging:   CT angio PE (6/18) no PE, basilar/peripheral GGO most likely atelectasis but could be mild edema/inflammatory alveolitis    Interventions:   s/p Tylenol 650mg x1, hydromorphone 1mg x1  Walking pulse Ox 84% with tachycardia 106    PMHx  As  above    Meds:   Warfarin 3mg daily (HELD 6/16, followed by  coumadin clinic 6/19 scheduled)  Aspirin 81  Ator 80 (stopped)  Diltiazem  daily (for vascular wall tone)    Gabapentin 600 TID  Percocet 2 tabs BID PRN  Duloxetine 60 daily   Bupropion   Trazodone 50  Pantoprazole 40  Cholecalciferol 71769 UNITS every Sunday   Cyanocobalamin 1 tab daily  Folic acid 1 daily  Duonebs    Allergies: Metformin (Diarrhea)    Fam Hx: Her son/sister/multiple people in her family have Hx of arterial clots    Soc Hx:  Alcohol: 1 cocktail weekly  Tobacco:  quit 1988  Illicits: denies  Living: lives with her     Physical Exam  Alert and oriented x4, not in acute distress  Clear lung sound w/o rale, no wheezing  RHB w/o murmur, normal S1/S2, no JVD  Soft and flat abdomen with mild RUQ tenderness (but much improved), normoactive bowel sound, no CVA tenderness  No pitting edema  No other significant P/Ex findings      Past Medical History  Past Medical History:   Diagnosis Date    COVID-19     COVID    Personal history of other venous thrombosis and embolism     History of deep venous thrombosis       Surgical History  Past Surgical History:   Procedure Laterality Date    CT AORTA AND BILATERAL ILIOFEMORAL RUNOFF ANGIOGRAM W AND/OR WO IV CONTRAST  12/8/2022    CT AORTA AND BILATERAL ILIOFEMORAL RUNOFF ANGIOGRAM W AND/OR WO IV CONTRAST 12/8/2022 [facility]    CT AORTA AND BILATERAL ILIOFEMORAL RUNOFF ANGIOGRAM W AND/OR WO IV CONTRAST  12/13/2022    CT AORTA AND BILATERAL ILIOFEMORAL RUNOFF ANGIOGRAM W AND/OR WO IV CONTRAST 12/13/2022 [facility]    OTHER SURGICAL HISTORY  07/08/2022    Tubal ligation    OTHER SURGICAL HISTORY  07/08/2022    Shoulder surgery    OTHER SURGICAL HISTORY  07/08/2022    Gallbladder surgery        Social History  She reports that she quit smoking about 36 years ago. Her smoking use included cigarettes. She has never used smokeless tobacco. She reports current alcohol use.  "She reports that she does not use drugs.    Family History  No family history on file.     Allergies  Promethazine and Phenothiazines    Review of Systems     Physical Exam     Last Recorded Vitals  Blood pressure 132/74, pulse 84, temperature 37.1 °C (98.8 °F), resp. rate 20, height 1.626 m (5' 4\"), weight 79.8 kg (176 lb), SpO2 99%.    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 She has been hemodynamically stable at rest but still hypoxic on walking. Her Hgb is stable (13)  with INR 1.5 and has had 3 more episodes here that amount seems to be decreased. We'll continue to monitor her respiration and keep holding warfarin/aspirin.    #Hemoptysis i/s/o elevated INR i/s/o ischemic hepatitis  :: stable at rest but still hypoxic (85%) on exertion  :: INR 1.5 ([facility]; 6.9 (6/17)<7.2 (6/16)<6.7 (6/15; last dose of warfarin)<2.6 (6/14))  :: stable Hgb (13)  -keep holding warfarin, held aspirin  -continue to monitor respiration  -daily CBC/INR    #Hx Arterial thromboembolism in RLE s/p RLE thrombectomy, angioplasty and stenting of the right SFA, AK (Above knee)-pop and TP (tibioperoneal) trunk angioplasty with 4 compartment fasciotomy in 12/2022  #Hx of RUE thromboembolism 2004 s/p embolectomy in [place] ([place])   :: follows  vascular medicine/surgery  :: h/o HCY elevation, +elevated lipoprotein (A) x 1 (other check was normal); APLA - normal; VERA negative. ECHO - no PFO - fairly normal  -as above    #Ischemic hepatitis improving  :: viral hepatitis panel (A, B, C) neg  :: Although APCT with contrast was neg for infarction, " clinical feature (elevated LDH, sudden intractable RUQ pain) was consistent with liver infarction  -continue to monitor hepatic function including INR    F: bolus prn  E: replete prn  N: regular  GI: pantoprazole 40  DVT: holding warfarin/aspirin, on SCD  A: PIVs    FULL code (confirmed on admission), Mic (Spouse) [phone]      Koby Nuñez MD

## 2024-06-20 LAB
ALBUMIN SERPL BCP-MCNC: 3.7 G/DL (ref 3.4–5)
ALP SERPL-CCNC: 141 U/L (ref 33–136)
ALT SERPL W P-5'-P-CCNC: 155 U/L (ref 7–45)
ANION GAP SERPL CALC-SCNC: 14 MMOL/L (ref 10–20)
APTT PPP: 107 SECONDS (ref 27–38)
AST SERPL W P-5'-P-CCNC: 21 U/L (ref 9–39)
B2 GLYCOPROT1 IGA SER-ACNC: 6.3 U/ML
B2 GLYCOPROT1 IGG SER-ACNC: <1.4 U/ML
B2 GLYCOPROT1 IGM SER-ACNC: 7 U/ML
BASOPHILS # BLD AUTO: 0.03 X10*3/UL (ref 0–0.1)
BASOPHILS NFR BLD AUTO: 0.6 %
BILIRUB SERPL-MCNC: 0.6 MG/DL (ref 0–1.2)
BUN SERPL-MCNC: 11 MG/DL (ref 6–23)
CALCIUM SERPL-MCNC: 9.5 MG/DL (ref 8.6–10.6)
CARDIOLIPIN IGA SERPL-ACNC: 6.7 APL U/ML
CARDIOLIPIN IGG SER IA-ACNC: <1.6 GPL U/ML
CARDIOLIPIN IGM SER IA-ACNC: 7.5 MPL U/ML
CHLORIDE SERPL-SCNC: 104 MMOL/L (ref 98–107)
CO2 SERPL-SCNC: 26 MMOL/L (ref 21–32)
CREAT SERPL-MCNC: 0.7 MG/DL (ref 0.5–1.05)
CRP SERPL-MCNC: 1.89 MG/DL
D DIMER PPP FEU-MCNC: 553 NG/ML FEU
DRVVT SCREEN TO CONFIRM RATIO: 0.9 RATIO
DRVVT/DRVVT CFM NRMLZD PPP-RTO: 0.94 RATIO
DRVVT/DRVVT CFM P DOAC NEUT NORM PPP-RTO: 0.96 RATIO
EGFRCR SERPLBLD CKD-EPI 2021: >90 ML/MIN/1.73M*2
EOSINOPHIL # BLD AUTO: 0.17 X10*3/UL (ref 0–0.7)
EOSINOPHIL NFR BLD AUTO: 3.5 %
ERYTHROCYTE [DISTWIDTH] IN BLOOD BY AUTOMATED COUNT: 13.2 % (ref 11.5–14.5)
ERYTHROCYTE [SEDIMENTATION RATE] IN BLOOD BY WESTERGREN METHOD: 46 MM/H (ref 0–30)
GLUCOSE SERPL-MCNC: 95 MG/DL (ref 74–99)
HCT VFR BLD AUTO: 35.9 % (ref 36–46)
HGB BLD-MCNC: 12 G/DL (ref 12–16)
IMM GRANULOCYTES # BLD AUTO: 0.01 X10*3/UL (ref 0–0.7)
IMM GRANULOCYTES NFR BLD AUTO: 0.2 % (ref 0–0.9)
INR PPP: 1 (ref 0.9–1.1)
LA 2 SCREEN W REFLEX-IMP: NORMAL
LYMPHOCYTES # BLD AUTO: 1.59 X10*3/UL (ref 1.2–4.8)
LYMPHOCYTES NFR BLD AUTO: 33.1 %
MAGNESIUM SERPL-MCNC: 1.89 MG/DL (ref 1.6–2.4)
MCH RBC QN AUTO: 32.4 PG (ref 26–34)
MCHC RBC AUTO-ENTMCNC: 33.4 G/DL (ref 32–36)
MCV RBC AUTO: 97 FL (ref 80–100)
MONOCYTES # BLD AUTO: 0.55 X10*3/UL (ref 0.1–1)
MONOCYTES NFR BLD AUTO: 11.5 %
NEUTROPHILS # BLD AUTO: 2.45 X10*3/UL (ref 1.2–7.7)
NEUTROPHILS NFR BLD AUTO: 51.1 %
NORMALIZED SCT PPP-RTO: 0.94 RATIO
NRBC BLD-RTO: 0 /100 WBCS (ref 0–0)
PHOSPHATE SERPL-MCNC: 4.3 MG/DL (ref 2.5–4.9)
PLATELET # BLD AUTO: 213 X10*3/UL (ref 150–450)
POTASSIUM SERPL-SCNC: 4.1 MMOL/L (ref 3.5–5.3)
PROT SERPL-MCNC: 5.8 G/DL (ref 6.4–8.2)
PROT SERPL-MCNC: 5.8 G/DL (ref 6.4–8.2)
PROTHROMBIN TIME: 11.7 SECONDS (ref 9.8–12.8)
RBC # BLD AUTO: 3.7 X10*6/UL (ref 4–5.2)
SILICA CLOTTING TIME CONFIRMATION: 1.03 RATIO
SILICA CLOTTING TIME SCREEN: 0.97 RATIO
SODIUM SERPL-SCNC: 140 MMOL/L (ref 136–145)
UFH PPP CHRO-ACNC: 0.7 IU/ML
UFH PPP CHRO-ACNC: 0.7 IU/ML
UFH PPP CHRO-ACNC: 0.8 IU/ML
WBC # BLD AUTO: 4.8 X10*3/UL (ref 4.4–11.3)

## 2024-06-20 PROCEDURE — 85613 RUSSELL VIPER VENOM DILUTED: CPT

## 2024-06-20 PROCEDURE — 2500000004 HC RX 250 GENERAL PHARMACY W/ HCPCS (ALT 636 FOR OP/ED)

## 2024-06-20 PROCEDURE — 86334 IMMUNOFIX E-PHORESIS SERUM: CPT

## 2024-06-20 PROCEDURE — 85379 FIBRIN DEGRADATION QUANT: CPT

## 2024-06-20 PROCEDURE — 86146 BETA-2 GLYCOPROTEIN ANTIBODY: CPT

## 2024-06-20 PROCEDURE — 2500000002 HC RX 250 W HCPCS SELF ADMINISTERED DRUGS (ALT 637 FOR MEDICARE OP, ALT 636 FOR OP/ED)

## 2024-06-20 PROCEDURE — 36415 COLL VENOUS BLD VENIPUNCTURE: CPT

## 2024-06-20 PROCEDURE — 86036 ANCA SCREEN EACH ANTIBODY: CPT

## 2024-06-20 PROCEDURE — 85652 RBC SED RATE AUTOMATED: CPT

## 2024-06-20 PROCEDURE — 85025 COMPLETE CBC W/AUTO DIFF WBC: CPT

## 2024-06-20 PROCEDURE — 83735 ASSAY OF MAGNESIUM: CPT

## 2024-06-20 PROCEDURE — 86015 ACTIN ANTIBODY EACH: CPT

## 2024-06-20 PROCEDURE — 85520 HEPARIN ASSAY: CPT

## 2024-06-20 PROCEDURE — 84155 ASSAY OF PROTEIN SERUM: CPT

## 2024-06-20 PROCEDURE — 86140 C-REACTIVE PROTEIN: CPT

## 2024-06-20 PROCEDURE — 83516 IMMUNOASSAY NONANTIBODY: CPT

## 2024-06-20 PROCEDURE — 86147 CARDIOLIPIN ANTIBODY EA IG: CPT

## 2024-06-20 PROCEDURE — 85610 PROTHROMBIN TIME: CPT

## 2024-06-20 PROCEDURE — 1210000001 HC SEMI-PRIVATE ROOM DAILY

## 2024-06-20 PROCEDURE — 99233 SBSQ HOSP IP/OBS HIGH 50: CPT

## 2024-06-20 PROCEDURE — 84100 ASSAY OF PHOSPHORUS: CPT

## 2024-06-20 PROCEDURE — 86038 ANTINUCLEAR ANTIBODIES: CPT

## 2024-06-20 PROCEDURE — 2500000001 HC RX 250 WO HCPCS SELF ADMINISTERED DRUGS (ALT 637 FOR MEDICARE OP)

## 2024-06-20 RX ORDER — HEPARIN SODIUM 10000 [USP'U]/100ML
0-4500 INJECTION, SOLUTION INTRAVENOUS CONTINUOUS
Status: DISPENSED | OUTPATIENT
Start: 2024-06-20 | End: 2024-06-23

## 2024-06-20 RX ORDER — HEPARIN SODIUM 10000 [USP'U]/100ML
0-4500 INJECTION, SOLUTION INTRAVENOUS CONTINUOUS
Status: DISCONTINUED | OUTPATIENT
Start: 2024-06-20 | End: 2024-06-20

## 2024-06-20 RX ORDER — KETOROLAC TROMETHAMINE 30 MG/ML
30 INJECTION, SOLUTION INTRAMUSCULAR; INTRAVENOUS ONCE
OUTPATIENT
Start: 2024-06-28 | End: 2024-06-28

## 2024-06-20 SDOH — SOCIAL STABILITY: SOCIAL INSECURITY: ARE YOU OR HAVE YOU BEEN THREATENED OR ABUSED PHYSICALLY, EMOTIONALLY, OR SEXUALLY BY ANYONE?: NO

## 2024-06-20 SDOH — ECONOMIC STABILITY: HOUSING INSECURITY: IN THE LAST 12 MONTHS, HOW MANY PLACES HAVE YOU LIVED?: 1

## 2024-06-20 SDOH — ECONOMIC STABILITY: INCOME INSECURITY: IN THE LAST 12 MONTHS, WAS THERE A TIME WHEN YOU WERE NOT ABLE TO PAY THE MORTGAGE OR RENT ON TIME?: NO

## 2024-06-20 SDOH — SOCIAL STABILITY: SOCIAL INSECURITY: HAVE YOU HAD THOUGHTS OF HARMING ANYONE ELSE?: NO

## 2024-06-20 SDOH — SOCIAL STABILITY: SOCIAL INSECURITY: DO YOU FEEL ANYONE HAS EXPLOITED OR TAKEN ADVANTAGE OF YOU FINANCIALLY OR OF YOUR PERSONAL PROPERTY?: NO

## 2024-06-20 SDOH — ECONOMIC STABILITY: TRANSPORTATION INSECURITY
IN THE PAST 12 MONTHS, HAS LACK OF TRANSPORTATION KEPT YOU FROM MEETINGS, WORK, OR FROM GETTING THINGS NEEDED FOR DAILY LIVING?: NO

## 2024-06-20 SDOH — ECONOMIC STABILITY: TRANSPORTATION INSECURITY
IN THE PAST 12 MONTHS, HAS THE LACK OF TRANSPORTATION KEPT YOU FROM MEDICAL APPOINTMENTS OR FROM GETTING MEDICATIONS?: NO

## 2024-06-20 SDOH — SOCIAL STABILITY: SOCIAL INSECURITY: DOES ANYONE TRY TO KEEP YOU FROM HAVING/CONTACTING OTHER FRIENDS OR DOING THINGS OUTSIDE YOUR HOME?: NO

## 2024-06-20 SDOH — SOCIAL STABILITY: SOCIAL INSECURITY: WERE YOU ABLE TO COMPLETE ALL THE BEHAVIORAL HEALTH SCREENINGS?: YES

## 2024-06-20 SDOH — SOCIAL STABILITY: SOCIAL INSECURITY: ARE THERE ANY APPARENT SIGNS OF INJURIES/BEHAVIORS THAT COULD BE RELATED TO ABUSE/NEGLECT?: NO

## 2024-06-20 SDOH — SOCIAL STABILITY: SOCIAL INSECURITY: DO YOU FEEL UNSAFE GOING BACK TO THE PLACE WHERE YOU ARE LIVING?: NO

## 2024-06-20 SDOH — SOCIAL STABILITY: SOCIAL INSECURITY: HAS ANYONE EVER THREATENED TO HURT YOUR FAMILY OR YOUR PETS?: NO

## 2024-06-20 SDOH — SOCIAL STABILITY: SOCIAL INSECURITY: HAVE YOU HAD ANY THOUGHTS OF HARMING ANYONE ELSE?: NO

## 2024-06-20 SDOH — ECONOMIC STABILITY: INCOME INSECURITY: HOW HARD IS IT FOR YOU TO PAY FOR THE VERY BASICS LIKE FOOD, HOUSING, MEDICAL CARE, AND HEATING?: NOT HARD AT ALL

## 2024-06-20 SDOH — SOCIAL STABILITY: SOCIAL INSECURITY: ABUSE: ADULT

## 2024-06-20 ASSESSMENT — ACTIVITIES OF DAILY LIVING (ADL)
HEARING - RIGHT EAR: FUNCTIONAL
ADEQUATE_TO_COMPLETE_ADL: YES
FEEDING YOURSELF: INDEPENDENT
BATHING: INDEPENDENT
HEARING - LEFT EAR: FUNCTIONAL
WALKS IN HOME: INDEPENDENT
GROOMING: INDEPENDENT
TOILETING: INDEPENDENT
PATIENT'S MEMORY ADEQUATE TO SAFELY COMPLETE DAILY ACTIVITIES?: YES
JUDGMENT_ADEQUATE_SAFELY_COMPLETE_DAILY_ACTIVITIES: YES
DRESSING YOURSELF: INDEPENDENT

## 2024-06-20 ASSESSMENT — COGNITIVE AND FUNCTIONAL STATUS - GENERAL
MOBILITY SCORE: 23
DAILY ACTIVITIY SCORE: 24
CLIMB 3 TO 5 STEPS WITH RAILING: A LITTLE
DAILY ACTIVITIY SCORE: 24
MOBILITY SCORE: 24
DAILY ACTIVITIY SCORE: 24
PATIENT BASELINE BEDBOUND: NO
MOBILITY SCORE: 24

## 2024-06-20 ASSESSMENT — PATIENT HEALTH QUESTIONNAIRE - PHQ9
1. LITTLE INTEREST OR PLEASURE IN DOING THINGS: NOT AT ALL
SUM OF ALL RESPONSES TO PHQ9 QUESTIONS 1 & 2: 0
2. FEELING DOWN, DEPRESSED OR HOPELESS: NOT AT ALL

## 2024-06-20 ASSESSMENT — PAIN SCALES - GENERAL
PAINLEVEL_OUTOF10: 8
PAINLEVEL_OUTOF10: 8
PAINLEVEL_OUTOF10: 7
PAINLEVEL_OUTOF10: 3
PAINLEVEL_OUTOF10: 7

## 2024-06-20 ASSESSMENT — LIFESTYLE VARIABLES
HOW MANY STANDARD DRINKS CONTAINING ALCOHOL DO YOU HAVE ON A TYPICAL DAY: 1 OR 2
AUDIT-C TOTAL SCORE: 2
SKIP TO QUESTIONS 9-10: 1
HOW OFTEN DO YOU HAVE A DRINK CONTAINING ALCOHOL: 2-4 TIMES A MONTH
AUDIT-C TOTAL SCORE: 2
HOW OFTEN DO YOU HAVE 6 OR MORE DRINKS ON ONE OCCASION: NEVER

## 2024-06-20 ASSESSMENT — PAIN DESCRIPTION - ORIENTATION: ORIENTATION: UPPER;MID

## 2024-06-20 ASSESSMENT — PAIN DESCRIPTION - LOCATION
LOCATION: ABDOMEN
LOCATION: ABDOMEN

## 2024-06-20 NOTE — CARE PLAN
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  She has been hemodynamically stable at rest but still hypoxic on walking. Her Hgb is stable (13) with INR 1.5 and has had 3 more episodes here that amount seems to be decreased, hemoptysis with wide range of differential diagnoses ,ranging from infective causes, autoimmune causes, and the presence of thrombocytopenia can be (ITP, TTP, HUS although nothing is supporting those diagnoses but we will keep monitoring the platelets and any other signs or symptoms), ruptured AV malformation specially in the presence of high INR secondary to Coumadin     Patient is doing much better today, no history of hemoptysis in the past 24 hours, patient is currently on heparin for now, we did not start Coumadin yet labs are pending.

## 2024-06-20 NOTE — PROGRESS NOTES
Subjective     Interval events:    No acute events overnight. This AM, the patient endorses generally feeling well. She endorses occasional blood streaks in her sputum, but that it is much improved.     Objective     Vitals:  Vitals:    06/20/24 0600   BP: 103/61   Pulse: 69   Resp: 18   Temp: 36.2 °C (97.2 °F)   SpO2: 96%       I/O last 3 completed shifts:  In: 336 (4.2 mL/kg) [P.O.:240; I.V.:96 (1.2 mL/kg)]  Out: - (0 mL/kg)   Weight: 79.8 kg   I/O this shift:  In: 220 [P.O.:220]  Out: -     Physical exam:  Constitutional: Well-developed female in no acute distress.  HEENT: Normocephalic, atraumatic. PERRL. EOMI. No cervical lymphadenopathy.  Respiratory: CTA bilaterally. No wheezes, rales, or rhonchi. Normal respiratory effort.  Cardiovascular: RRR. No murmurs, gallops, or rubs. No JVD. Radial pulses 2+.  Abdominal: Soft, nondistended, nontender to palpation. Bowel sounds present. No hepatosplenomegaly or masses. No CVA tenderness.  Neuro: CN II-XII intact. UE and LE strength 5/5 bilaterally and sensation intact. Normal FTN testing.  MSK: No LE edema bilaterally.  Skin: Warm, dry. No rashes or wounds.  Psych: Appropriate mood and affect.    Medications:  buPROPion XL, 150 mg, oral, q AM  [START ON 6/23/2024] cholecalciferol, 50,000 Units, oral, Every Sunday  cyanocobalamin, 100 mcg, oral, Daily  dilTIAZem CD, 120 mg, oral, Daily  DULoxetine, 60 mg, oral, Daily  folic acid, 1 mg, oral, Daily  gabapentin, 600 mg, oral, TID  pantoprazole, 40 mg, oral, q AM      heparin, 0-4,500 Units/hr, Last Rate: 1,000 Units/hr (06/20/24 1359)      PRN medications: HYDROmorphone, ipratropium-albuteroL, melatonin, oxyCODONE, polyethylene glycol    Labs:  Results for orders placed or performed during the hospital encounter of 06/18/24 (from the past 24 hour(s))   CBC and Auto Differential   Result Value Ref Range    WBC 5.8 4.4 - 11.3 x10*3/uL    nRBC 0.0 0.0 - 0.0 /100 WBCs    RBC 3.57 (L) 4.00 - 5.20 x10*6/uL    Hemoglobin 11.5  (L) 12.0 - 16.0 g/dL    Hematocrit 34.3 (L) 36.0 - 46.0 %    MCV 96 80 - 100 fL    MCH 32.2 26.0 - 34.0 pg    MCHC 33.5 32.0 - 36.0 g/dL    RDW 13.2 11.5 - 14.5 %    Platelets 208 150 - 450 x10*3/uL    Neutrophils % 55.3 40.0 - 80.0 %    Immature Granulocytes %, Automated 0.2 0.0 - 0.9 %    Lymphocytes % 31.3 13.0 - 44.0 %    Monocytes % 9.9 2.0 - 10.0 %    Eosinophils % 2.6 0.0 - 6.0 %    Basophils % 0.7 0.0 - 2.0 %    Neutrophils Absolute 3.23 1.20 - 7.70 x10*3/uL    Immature Granulocytes Absolute, Automated 0.01 0.00 - 0.70 x10*3/uL    Lymphocytes Absolute 1.83 1.20 - 4.80 x10*3/uL    Monocytes Absolute 0.58 0.10 - 1.00 x10*3/uL    Eosinophils Absolute 0.15 0.00 - 0.70 x10*3/uL    Basophils Absolute 0.04 0.00 - 0.10 x10*3/uL   Heparin Assay, UFH   Result Value Ref Range    Heparin Unfractionated 0.5 See Comment Below for Therapeutic Ranges IU/mL   Heparin Assay, UFH   Result Value Ref Range    Heparin Unfractionated 0.5 See Comment Below for Therapeutic Ranges IU/mL   CBC and Auto Differential   Result Value Ref Range    WBC 4.8 4.4 - 11.3 x10*3/uL    nRBC 0.0 0.0 - 0.0 /100 WBCs    RBC 3.70 (L) 4.00 - 5.20 x10*6/uL    Hemoglobin 12.0 12.0 - 16.0 g/dL    Hematocrit 35.9 (L) 36.0 - 46.0 %    MCV 97 80 - 100 fL    MCH 32.4 26.0 - 34.0 pg    MCHC 33.4 32.0 - 36.0 g/dL    RDW 13.2 11.5 - 14.5 %    Platelets 213 150 - 450 x10*3/uL    Neutrophils % 51.1 40.0 - 80.0 %    Immature Granulocytes %, Automated 0.2 0.0 - 0.9 %    Lymphocytes % 33.1 13.0 - 44.0 %    Monocytes % 11.5 2.0 - 10.0 %    Eosinophils % 3.5 0.0 - 6.0 %    Basophils % 0.6 0.0 - 2.0 %    Neutrophils Absolute 2.45 1.20 - 7.70 x10*3/uL    Immature Granulocytes Absolute, Automated 0.01 0.00 - 0.70 x10*3/uL    Lymphocytes Absolute 1.59 1.20 - 4.80 x10*3/uL    Monocytes Absolute 0.55 0.10 - 1.00 x10*3/uL    Eosinophils Absolute 0.17 0.00 - 0.70 x10*3/uL    Basophils Absolute 0.03 0.00 - 0.10 x10*3/uL   Comprehensive metabolic panel   Result Value Ref Range     Glucose 95 74 - 99 mg/dL    Sodium 140 136 - 145 mmol/L    Potassium 4.1 3.5 - 5.3 mmol/L    Chloride 104 98 - 107 mmol/L    Bicarbonate 26 21 - 32 mmol/L    Anion Gap 14 10 - 20 mmol/L    Urea Nitrogen 11 6 - 23 mg/dL    Creatinine 0.70 0.50 - 1.05 mg/dL    eGFR >90 >60 mL/min/1.73m*2    Calcium 9.5 8.6 - 10.6 mg/dL    Albumin 3.7 3.4 - 5.0 g/dL    Alkaline Phosphatase 141 (H) 33 - 136 U/L    Total Protein 5.8 (L) 6.4 - 8.2 g/dL    AST 21 9 - 39 U/L    Bilirubin, Total 0.6 0.0 - 1.2 mg/dL     (H) 7 - 45 U/L   Phosphorus   Result Value Ref Range    Phosphorus 4.3 2.5 - 4.9 mg/dL   Magnesium   Result Value Ref Range    Magnesium 1.89 1.60 - 2.40 mg/dL   Sedimentation rate, automated   Result Value Ref Range    Sedimentation Rate 46 (H) 0 - 30 mm/h   C-reactive protein   Result Value Ref Range    C-Reactive Protein 1.89 (H) <1.00 mg/dL   Coagulation Screen   Result Value Ref Range    Protime 11.7 9.8 - 12.8 seconds    INR 1.0 0.9 - 1.1    aPTT 107 (HH) 27 - 38 seconds   D-dimer, Non VTE   Result Value Ref Range    D-Dimer Non VTE, Quant (ng/mL FEU) 553 (H) <=500 ng/mL FEU   Lupus Anticoagulant With Interpretation [GARCIA]   Result Value Ref Range    DRVVT Screen 0.90 RATIO    DRVVT Confirmation 0.94 RATIO    DRVVT Test Ratio 0.96 <=1.20 RATIO    SCT Screen 0.97 RATIO    SCT Confirmation 1.03 RATIO    SCT Test Ratio 0.94 <=1.16 RATIO    Lupus Anticoagulant Interpretation       No evidence of lupus anticoagulant in these assays (DRVVT and Silica Clotting Time (SCT)). Assay interferences may occur in the presence of factor deficiency/inhibitor and/or anticoagulants. For patients on anti-Vitamin K therapy, repeating DRVVT testing might be indicated when the patient is off anti-vitamin K therapy. The DRVVT assay contains a heparin neutralizer up to 1.0 U/mL. Higher concentrations of heparin may cause interferences. SCT results are not affected by UF heparin up to 0.5 U/mL and LMW Heparin up to 1.0 U/mL. Higher  concentrations of heparin may cause interferences. Correlation with clinical findings and clinical history is necessary to assess significance of results in an individual patient.   Protein, Total   Result Value Ref Range    Total Protein 5.8 (L) 6.4 - 8.2 g/dL   Heparin Assay, UFH   Result Value Ref Range    Heparin Unfractionated 0.8 See Comment Below for Therapeutic Ranges IU/mL       Imaging:  [unfilled]         Assessment and plan:  Angle Martins is a 64 y.o. 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 She has been hemodynamically stable at rest but still hypoxic on walking. Her Hgb is stable (13)  with INR 1.5 and has had 3 more episodes here that amount seems to be decreased. We'll continue to monitor her respiration and keep holding warfarin/aspirin.    Updates 6/20:   - Hg stable at 11  - Vascular med consulted, appreciate recs  - Increase from low intensity to high intensity heparin drip given patient stable on low intensity for past 24 hours   - Will attempt to restart home warfarin tomorrow if no recurrence of hemoptysis and Hg stable      #Hemoptysis i/s/o elevated INR i/s/o ischemic hepatitis  :: stable at rest but still hypoxic (85%) on exertion  :: INR 1.5 ([facility]; 6.9 (6/17)<7.2 (6/16)<6.7 (6/15; last dose of warfarin)<2.6 (6/14))  :: stable Hgb (13)  -keep holding warfarin, held aspirin  -continue to monitor respiration  - Vascular medicine consult  - Will trial AC with low intensity heparin gtt today (no bolus)  - PM CBC   -daily CBC/INR     #Hx Arterial thromboembolism in RLE s/p RLE thrombectomy, angioplasty and stenting of the right SFA, AK (Above knee)-pop and TP (tibioperoneal)  trunk angioplasty with 4 compartment fasciotomy in 12/2022  #Hx of RUE thromboembolism 2004 s/p embolectomy in [place] ([place])   :: follows with Dr. Munson from  vascular medicine/surgery  :: h/o HCY elevation, +elevated lipoprotein (A) x 1 (other check was normal); APLA - normal; VERA negative. ECHO - no PFO - fairly normal  -as above     #Ischemic hepatitis improving  :: viral hepatitis panel (A, B, C) neg  :: Although APCT with contrast was neg for infarction, clinical feature (elevated LDH, sudden intractable RUQ pain) was consistent with liver infarction  -continue to monitor hepatic function including INR     F: bolus prn  E: replete prn  N: regular  GI: pantoprazole 40  DVT: holding warfarin/aspirin, on SCD  A: PIVs  FULL code (confirmed on admission), Mic (Spouse) [phone]    Patient and plan discussed with attending physician Dr. Medina.    Emiliana Ramsey MD  PGY-1 Internal Medicine

## 2024-06-20 NOTE — CARE PLAN
The patient's goals for the shift include      The clinical goals for the shift include pt. will remain safe.

## 2024-06-21 LAB
ALBUMIN SERPL BCP-MCNC: 3.8 G/DL (ref 3.4–5)
ANA SER QL HEP2 SUBST: NEGATIVE
ANION GAP SERPL CALC-SCNC: 13 MMOL/L (ref 10–20)
APTT PPP: 75 SECONDS (ref 27–38)
BASOPHILS # BLD AUTO: 0.04 X10*3/UL (ref 0–0.1)
BASOPHILS NFR BLD AUTO: 0.7 %
BUN SERPL-MCNC: 13 MG/DL (ref 6–23)
CALCIUM SERPL-MCNC: 9.8 MG/DL (ref 8.6–10.6)
CHLORIDE SERPL-SCNC: 102 MMOL/L (ref 98–107)
CO2 SERPL-SCNC: 26 MMOL/L (ref 21–32)
CREAT SERPL-MCNC: 0.75 MG/DL (ref 0.5–1.05)
EGFRCR SERPLBLD CKD-EPI 2021: 90 ML/MIN/1.73M*2
EOSINOPHIL # BLD AUTO: 0.16 X10*3/UL (ref 0–0.7)
EOSINOPHIL NFR BLD AUTO: 2.6 %
ERYTHROCYTE [DISTWIDTH] IN BLOOD BY AUTOMATED COUNT: 12.8 % (ref 11.5–14.5)
GLUCOSE SERPL-MCNC: 97 MG/DL (ref 74–99)
HCT VFR BLD AUTO: 35.1 % (ref 36–46)
HGB BLD-MCNC: 11.7 G/DL (ref 12–16)
IMM GRANULOCYTES # BLD AUTO: 0.01 X10*3/UL (ref 0–0.7)
IMM GRANULOCYTES NFR BLD AUTO: 0.2 % (ref 0–0.9)
LYMPHOCYTES # BLD AUTO: 1.94 X10*3/UL (ref 1.2–4.8)
LYMPHOCYTES NFR BLD AUTO: 31.9 %
MAGNESIUM SERPL-MCNC: 1.95 MG/DL (ref 1.6–2.4)
MCH RBC QN AUTO: 31.9 PG (ref 26–34)
MCHC RBC AUTO-ENTMCNC: 33.3 G/DL (ref 32–36)
MCV RBC AUTO: 96 FL (ref 80–100)
MONOCYTES # BLD AUTO: 0.71 X10*3/UL (ref 0.1–1)
MONOCYTES NFR BLD AUTO: 11.7 %
NEUTROPHILS # BLD AUTO: 3.23 X10*3/UL (ref 1.2–7.7)
NEUTROPHILS NFR BLD AUTO: 52.9 %
NRBC BLD-RTO: 0 /100 WBCS (ref 0–0)
PHOSPHATE SERPL-MCNC: 3.8 MG/DL (ref 2.5–4.9)
PLATELET # BLD AUTO: 233 X10*3/UL (ref 150–450)
POTASSIUM SERPL-SCNC: 4.2 MMOL/L (ref 3.5–5.3)
RBC # BLD AUTO: 3.67 X10*6/UL (ref 4–5.2)
SMOOTH MUSCLE AB SER QL IF: NEGATIVE
SODIUM SERPL-SCNC: 137 MMOL/L (ref 136–145)
UFH PPP CHRO-ACNC: 0.6 IU/ML
WBC # BLD AUTO: 6.1 X10*3/UL (ref 4.4–11.3)

## 2024-06-21 PROCEDURE — 80069 RENAL FUNCTION PANEL: CPT

## 2024-06-21 PROCEDURE — 36415 COLL VENOUS BLD VENIPUNCTURE: CPT

## 2024-06-21 PROCEDURE — 85730 THROMBOPLASTIN TIME PARTIAL: CPT

## 2024-06-21 PROCEDURE — 99233 SBSQ HOSP IP/OBS HIGH 50: CPT

## 2024-06-21 PROCEDURE — 2500000002 HC RX 250 W HCPCS SELF ADMINISTERED DRUGS (ALT 637 FOR MEDICARE OP, ALT 636 FOR OP/ED)

## 2024-06-21 PROCEDURE — 1210000001 HC SEMI-PRIVATE ROOM DAILY

## 2024-06-21 PROCEDURE — 2500000004 HC RX 250 GENERAL PHARMACY W/ HCPCS (ALT 636 FOR OP/ED)

## 2024-06-21 PROCEDURE — 2500000001 HC RX 250 WO HCPCS SELF ADMINISTERED DRUGS (ALT 637 FOR MEDICARE OP)

## 2024-06-21 PROCEDURE — 99232 SBSQ HOSP IP/OBS MODERATE 35: CPT | Performed by: NURSE PRACTITIONER

## 2024-06-21 PROCEDURE — 85520 HEPARIN ASSAY: CPT

## 2024-06-21 PROCEDURE — 83735 ASSAY OF MAGNESIUM: CPT

## 2024-06-21 PROCEDURE — 85025 COMPLETE CBC W/AUTO DIFF WBC: CPT

## 2024-06-21 RX ORDER — OXYCODONE HYDROCHLORIDE 10 MG/1
10 TABLET ORAL EVERY 8 HOURS PRN
Status: DISCONTINUED | OUTPATIENT
Start: 2024-06-21 | End: 2024-06-23 | Stop reason: HOSPADM

## 2024-06-21 RX ORDER — WARFARIN 3 MG/1
3 TABLET ORAL
Status: DISCONTINUED | OUTPATIENT
Start: 2024-06-21 | End: 2024-06-22

## 2024-06-21 ASSESSMENT — PAIN DESCRIPTION - LOCATION
LOCATION: ABDOMEN
LOCATION: LEG
LOCATION: ABDOMEN
LOCATION: ABDOMEN

## 2024-06-21 ASSESSMENT — COGNITIVE AND FUNCTIONAL STATUS - GENERAL
MOBILITY SCORE: 24
DAILY ACTIVITIY SCORE: 24
DAILY ACTIVITIY SCORE: 24
MOBILITY SCORE: 24

## 2024-06-21 ASSESSMENT — PAIN SCALES - GENERAL
PAINLEVEL_OUTOF10: 8
PAINLEVEL_OUTOF10: 7
PAINLEVEL_OUTOF10: 3
PAINLEVEL_OUTOF10: 8
PAINLEVEL_OUTOF10: 7
PAINLEVEL_OUTOF10: 7

## 2024-06-21 ASSESSMENT — PAIN - FUNCTIONAL ASSESSMENT
PAIN_FUNCTIONAL_ASSESSMENT: 0-10
PAIN_FUNCTIONAL_ASSESSMENT: 0-10

## 2024-06-21 ASSESSMENT — PAIN DESCRIPTION - ORIENTATION: ORIENTATION: RIGHT

## 2024-06-21 NOTE — CARE PLAN
Problem: Pain  Goal: Takes deep breaths with improved pain control throughout the shift  Outcome: Progressing  Goal: Turns in bed with improved pain control throughout the shift  Outcome: Progressing  Goal: Walks with improved pain control throughout the shift  Outcome: Progressing  Goal: Performs ADL's with improved pain control throughout shift  Outcome: Progressing  Goal: Participates in PT with improved pain control throughout the shift  Outcome: Progressing  Goal: Free from opioid side effects throughout the shift  Outcome: Progressing  Goal: Free from acute confusion related to pain meds throughout the shift  Outcome: Progressing     Problem: Pain - Adult  Goal: Verbalizes/displays adequate comfort level or baseline comfort level  Outcome: Progressing     Problem: Safety - Adult  Goal: Free from fall injury  Outcome: Progressing     Problem: Discharge Planning  Goal: Discharge to home or other facility with appropriate resources  Outcome: Progressing     Problem: Chronic Conditions and Co-morbidities  Goal: Patient's chronic conditions and co-morbidity symptoms are monitored and maintained or improved  Outcome: Progressing   The patient's goals for the shift include      The clinical goals for the shift include Patient will report satisfactory pain control during this shift

## 2024-06-21 NOTE — PROGRESS NOTES
Subjective     Interval events:    No acute events overnight. This AM, the patient endorses generally feeling well. She endorses occasional blood streaks in her sputum, but that it is much improved.     Objective     Vitals:  Vitals:    06/21/24 0612   BP: 98/61   Pulse: 84   Resp: 16   Temp: 36 °C (96.8 °F)   SpO2: 93%       I/O last 3 completed shifts:  In: 1324.2 (16.6 mL/kg) [P.O.:1120; I.V.:204.2 (2.6 mL/kg)]  Out: - (0 mL/kg)   Weight: 79.8 kg   I/O this shift:  In: 220 [P.O.:220]  Out: -     Physical exam:  Constitutional: Well-developed female in no acute distress.  HEENT: Normocephalic, atraumatic. PERRL. EOMI. No cervical lymphadenopathy.  Respiratory: CTA bilaterally. No wheezes, rales, or rhonchi. Normal respiratory effort.  Cardiovascular: RRR. No murmurs, gallops, or rubs. No JVD. Radial pulses 2+.  Abdominal: Soft, nondistended, nontender to palpation. Bowel sounds present. No hepatosplenomegaly or masses. No CVA tenderness.  Neuro: CN II-XII intact. UE and LE strength 5/5 bilaterally and sensation intact. Normal FTN testing.  MSK: No LE edema bilaterally.  Skin: Warm, dry. No rashes or wounds.  Psych: Appropriate mood and affect.    Medications:  buPROPion XL, 150 mg, oral, q AM  [START ON 6/23/2024] cholecalciferol, 50,000 Units, oral, Every Sunday  cyanocobalamin, 100 mcg, oral, Daily  dilTIAZem CD, 120 mg, oral, Daily  DULoxetine, 60 mg, oral, Daily  folic acid, 1 mg, oral, Daily  gabapentin, 600 mg, oral, TID  pantoprazole, 40 mg, oral, q AM      heparin, 0-4,500 Units/hr, Last Rate: 1,000 Units/hr (06/21/24 0656)      PRN medications: HYDROmorphone, ipratropium-albuteroL, melatonin, oxyCODONE, polyethylene glycol    Labs:  Results for orders placed or performed during the hospital encounter of 06/18/24 (from the past 24 hour(s))   Heparin Assay, UFH   Result Value Ref Range    Heparin Unfractionated 0.7 See Comment Below for Therapeutic Ranges IU/mL   Heparin Assay, UFH   Result Value Ref Range     Heparin Unfractionated 0.7 See Comment Below for Therapeutic Ranges IU/mL       Imaging:  [unfilled]         Assessment and plan:  Angle Martins is a 64 y.o. 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 She has been hemodynamically stable at rest but still hypoxic on walking. Her Hgb is stable (13)  with INR 1.5 and has had 3 more episodes here that amount seems to be decreased. We'll continue to monitor her respiration and keep holding warfarin/aspirin.    Updates 6/21:   - Hg stable at 11  - Will attempt to restart home warfarin, continue heparin gtt until INR therapeutic   - No recurrence of hemoptysis      #Hemoptysis i/s/o elevated INR i/s/o ischemic hepatitis  :: stable at rest but still hypoxic (85%) on exertion  :: INR 1.5 ([facility]; 6.9 (6/17)<7.2 (6/16)<6.7 (6/15; last dose of warfarin)<2.6 (6/14))  :: stable Hgb (13)  -keep holding warfarin, held aspirin  -continue to monitor respiration  - Vascular medicine consult  - Will attempt to restart home warfarin, continue heparin gtt until INR therapeutic   - PM CBC   -daily CBC/INR     #Hx Arterial thromboembolism in RLE s/p RLE thrombectomy, angioplasty and stenting of the right SFA, AK (Above knee)-pop and TP (tibioperoneal) trunk angioplasty with 4 compartment fasciotomy in 12/2022  #Hx of RUE thromboembolism 2004 s/p embolectomy in [place] ([place])   :: follows with Dr. Munson from  vascular medicine/surgery  :: h/o HCY elevation, +elevated lipoprotein (A) x 1 (other check was normal); APLA - normal; VERA negative. ECHO - no PFO - fairly normal  -as above     #Ischemic hepatitis improving  :: viral hepatitis panel (A, B, C) neg  ::  Although APCT with contrast was neg for infarction, clinical feature (elevated LDH, sudden intractable RUQ pain) was consistent with liver infarction  -continue to monitor hepatic function including INR     F: bolus prn  E: replete prn  N: regular  GI: pantoprazole 40  DVT: holding warfarin/aspirin, on SCD  A: PIVs  FULL code (confirmed on admission), Mic (Spouse) [phone]    Patient and plan discussed with attending physician Dr. Medina.    Emiliana Ramsey MD  PGY-1 Internal Medicine

## 2024-06-21 NOTE — PROGRESS NOTES
Angle Martins is a 64 y.o. female on day 3 of admission presenting with Hemoptysis.  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  Presented to OSH 6/14/24 with mid-sternal chest pain for the past 3 weeks, as well as abdominal pain with a single episode of bilious emesis.  Initial imaging (CT a/p, RUQ US) negative for acute findings.  Labs showed elevated liver enzymes ALT/AST/AP/T Bili. Admitted as inpatient for further evaluation of acute gastritis vs Hepatitis A. DC to home 6/17/24.   Presented to [facility] with report of hemoptysis, continued chest pain, RUQ pain and SOB on exertion. Admitted to the Pulmonary Service for further evaluation.   Warfarin and ASA were held, and low intensity Heparin infusion was started.    Vascular Medicine Service consulted for anticoagulation recommendations.   Continues with low intensity Heparin infusion, and primary service started Warfarin 3mg today.  Patient reports daily Warfarin dose is 3mg daily, and that her INR is monitored at [facility].  She is a patient of Dr. Stephani Quiroga.    Subjective   Patient reports that she no longer has hemoptysis, and that she received an initial dose of Warfarin 3mg this afternoon.   Denies CP, SOB or bleeding.      Objective   Vascular Medicine Service following for hemoptysis, as well as recommendations to restart home Warfarin.   Continues with low intensity Heparin infusion as bridge to Warfarin.     Physical Exam  Sitting up in bed in NAD; spouse at bedside  Respirations full and easy with breath sounds CTA all anterior lung fields; on RA  Normal heart sounds with regular rate/rhythm; vital signs are stable  Extremities are warm to  "touch with palpable bilateral radial and DP pulses; no evidence of swelling of upper/lower extremities; PIV  Patient is awake and alert, participates in conversation; calm with pleasant demeanor    Last Recorded Vitals  Blood pressure 129/85, pulse 75, temperature 36.1 °C (97 °F), resp. rate 16, height 1.626 m (5' 4\"), weight 79.8 kg (176 lb), SpO2 95%.  Intake/Output last 3 Shifts:  I/O last 3 completed shifts:  In: 1324.2 (16.6 mL/kg) [P.O.:1120; I.V.:204.2 (2.6 mL/kg)]  Out: - (0 mL/kg)   Weight: 79.8 kg     Relevant Results  Scheduled medications  buPROPion XL, 150 mg, oral, q AM  [START ON 6/23/2024] cholecalciferol, 50,000 Units, oral, Every Sunday  cyanocobalamin, 100 mcg, oral, Daily  dilTIAZem CD, 120 mg, oral, Daily  DULoxetine, 60 mg, oral, Daily  folic acid, 1 mg, oral, Daily  gabapentin, 600 mg, oral, TID  pantoprazole, 40 mg, oral, q AM  warfarin, 3 mg, oral, Once per day on Monday Wednesday Friday  [START ON 6/22/2024] warfarin, 4.5 mg, oral, Once per day on Sunday Tuesday Thursday Saturday      Continuous medications  heparin, 0-4,500 Units/hr, Last Rate: 1,000 Units/hr (06/21/24 1526)      Results from last 7 days   Lab Units 06/21/24  0909 06/20/24  0815 06/19/24  1705   WBC AUTO x10*3/uL 6.1 4.8 5.8   HEMOGLOBIN g/dL 11.7* 12.0 11.5*   HEMATOCRIT % 35.1* 35.9* 34.3*   PLATELETS AUTO x10*3/uL 233 213 208       Results from last 7 days   Lab Units 06/21/24  0909 06/20/24  0815 06/19/24  0433   SODIUM mmol/L 137 140 139   POTASSIUM mmol/L 4.2 4.1 3.8   CHLORIDE mmol/L 102 104 105   CO2 mmol/L 26 26 28   BUN mg/dL 13 11 10   CREATININE mg/dL 0.75 0.70 0.69   GLUCOSE mg/dL 97 95 90   CALCIUM mg/dL 9.8 9.5 9.2   Estimated Creatinine Clearance: 79.4 mL/min (by C-G formula based on SCr of 0.75 mg/dL).      Results from last 7 days   Lab Units 06/21/24  0648 06/20/24  0815 06/19/24  0545 06/18/24  1315   APTT seconds 75* 107*  --  32   INR   --  1.0 1.3* 1.5*       Results from last 7 days   Lab Units " 06/21/24  0648 06/20/24  1729 06/20/24  1358   ANTI XA UNFRACTIONATED IU/mL 0.6 0.7 0.7            Assessment/Plan   Principal Problem:    Hemoptysis    64 year old female with medical history as noted above.   Admitted to [facility] for report of chest pain, hemoptysis, SOB on exertion, which have all resolved.  Continues with low intensity Heparin infusion as bridge to Warfarin.  Home dose of Warfarin is 3mg daily, and INR is checked at the [facility] ([phone]).  Review of labs today shows mild decrease of hemoglobin to 11.1 grams, stable platelets 233 K, and stable serum creatinine 0.75 with creatinine clearance of 79 ml/minute.    Date    INR     Warfarin Dose     Comments  6/20     1.0              ----             Baseline INR  6/21     ---             3mg             Strat bridge to Warfarin    Recommendations:  ~CONTINUE Heparin infusion for now; follow nomogram to achieve therapeutic assay 0.3-0.6  ~Consider transition to Lovenox 80mg q12 hours for ease in administration, and would allow patient to complete bridge to Warfarin as outpatient. START Lovenox 60mg at 8am OR 8pm; STOP heparin infusion 1 hour after initial dose of Lovenox; continue Lovenox 60mg q12 hour dosing at 8a/8p until INR is therapeutic level for 2 days in a row.  ~RN to teach patient how to self administer Lovenox.   ~Would need Lovenox 60mg syringe ordered: Lovenox 60mg #10 syringes, Sig: administer Lovenox 60mg q12 hours, with 1 refill.   ~Warfarin dose 3mg daily  ~Monitor Heparin assay per nomogram; daily INR  ~Will need a minimum of 5 days of overlap therapy with Heparin OR Lovenox and Warfarin; goal INR is 2-3; when INR is >2 for 2 days in a row, Heparin/Lovenox can be stopped; if using Lovenox, patient may continue outpatient bridge to Warfarin with close INR monitoring.    ~Please schedule patient for Coumadin Clinic appointment at the [facility].  She should be seen 1-2 days after hospital  discharge.  ~Patient has scheduled outpatient follow up with Dr. Quiroga on 8/14/24 at 11:20 am at the [facility].       Dr. Meron Bonilla is on call this weekend for the Vascular Medicine Service and can be reached on pager [phone] for questions.     Plan of care discussed with Dr. Montez  Plan of care discussed with the intern from the [facility] Pulmonary Team    MENDEZ Pedersen-CNP  Vascular Medicine Service   Pager [phone]

## 2024-06-22 LAB
BASOPHILS # BLD AUTO: 0.04 X10*3/UL (ref 0–0.1)
BASOPHILS NFR BLD AUTO: 0.7 %
BM IGG SER IF-ACNC: 0 AU/ML (ref 0–19)
EOSINOPHIL # BLD AUTO: 0.16 X10*3/UL (ref 0–0.7)
EOSINOPHIL NFR BLD AUTO: 2.9 %
ERYTHROCYTE [DISTWIDTH] IN BLOOD BY AUTOMATED COUNT: 13 % (ref 11.5–14.5)
HCT VFR BLD AUTO: 37.5 % (ref 36–46)
HGB BLD-MCNC: 12.1 G/DL (ref 12–16)
IMM GRANULOCYTES # BLD AUTO: 0.02 X10*3/UL (ref 0–0.7)
IMM GRANULOCYTES NFR BLD AUTO: 0.4 % (ref 0–0.9)
INR PPP: 0.9 (ref 0.9–1.1)
LYMPHOCYTES # BLD AUTO: 1.8 X10*3/UL (ref 1.2–4.8)
LYMPHOCYTES NFR BLD AUTO: 32.3 %
MCH RBC QN AUTO: 31.6 PG (ref 26–34)
MCHC RBC AUTO-ENTMCNC: 32.3 G/DL (ref 32–36)
MCV RBC AUTO: 98 FL (ref 80–100)
MONOCYTES # BLD AUTO: 0.67 X10*3/UL (ref 0.1–1)
MONOCYTES NFR BLD AUTO: 12 %
NEUTROPHILS # BLD AUTO: 2.89 X10*3/UL (ref 1.2–7.7)
NEUTROPHILS NFR BLD AUTO: 51.7 %
NRBC BLD-RTO: 0 /100 WBCS (ref 0–0)
PLATELET # BLD AUTO: 263 X10*3/UL (ref 150–450)
PROTHROMBIN TIME: 10.3 SECONDS (ref 9.8–12.8)
RBC # BLD AUTO: 3.83 X10*6/UL (ref 4–5.2)
UFH PPP CHRO-ACNC: 0.6 IU/ML
WBC # BLD AUTO: 5.6 X10*3/UL (ref 4.4–11.3)

## 2024-06-22 PROCEDURE — 2500000004 HC RX 250 GENERAL PHARMACY W/ HCPCS (ALT 636 FOR OP/ED)

## 2024-06-22 PROCEDURE — 2500000001 HC RX 250 WO HCPCS SELF ADMINISTERED DRUGS (ALT 637 FOR MEDICARE OP)

## 2024-06-22 PROCEDURE — 85520 HEPARIN ASSAY: CPT

## 2024-06-22 PROCEDURE — 2500000002 HC RX 250 W HCPCS SELF ADMINISTERED DRUGS (ALT 637 FOR MEDICARE OP, ALT 636 FOR OP/ED)

## 2024-06-22 PROCEDURE — 1100000001 HC PRIVATE ROOM DAILY

## 2024-06-22 PROCEDURE — 85610 PROTHROMBIN TIME: CPT

## 2024-06-22 PROCEDURE — 99233 SBSQ HOSP IP/OBS HIGH 50: CPT

## 2024-06-22 PROCEDURE — 85025 COMPLETE CBC W/AUTO DIFF WBC: CPT

## 2024-06-22 PROCEDURE — RXMED WILLOW AMBULATORY MEDICATION CHARGE

## 2024-06-22 PROCEDURE — 36415 COLL VENOUS BLD VENIPUNCTURE: CPT

## 2024-06-22 RX ORDER — ENOXAPARIN SODIUM 100 MG/ML
1 INJECTION SUBCUTANEOUS EVERY 12 HOURS SCHEDULED
Status: DISCONTINUED | OUTPATIENT
Start: 2024-06-23 | End: 2024-06-23 | Stop reason: HOSPADM

## 2024-06-22 RX ORDER — WARFARIN 3 MG/1
3 TABLET ORAL DAILY
Status: DISCONTINUED | OUTPATIENT
Start: 2024-06-22 | End: 2024-06-23 | Stop reason: HOSPADM

## 2024-06-22 RX ORDER — ENOXAPARIN SODIUM 100 MG/ML
1 INJECTION SUBCUTANEOUS EVERY 12 HOURS SCHEDULED
Qty: 8 ML | Refills: 0 | Status: SHIPPED | OUTPATIENT
Start: 2024-06-23 | End: 2024-06-28

## 2024-06-22 ASSESSMENT — COGNITIVE AND FUNCTIONAL STATUS - GENERAL
DAILY ACTIVITIY SCORE: 24
MOBILITY SCORE: 24
MOBILITY SCORE: 24
DAILY ACTIVITIY SCORE: 24

## 2024-06-22 ASSESSMENT — PAIN SCALES - GENERAL
PAINLEVEL_OUTOF10: 7
PAINLEVEL_OUTOF10: 9
PAINLEVEL_OUTOF10: 7
PAINLEVEL_OUTOF10: 7
PAINLEVEL_OUTOF10: 3

## 2024-06-22 ASSESSMENT — PAIN DESCRIPTION - LOCATION
LOCATION: ABDOMEN
LOCATION: ABDOMEN

## 2024-06-22 ASSESSMENT — PAIN - FUNCTIONAL ASSESSMENT
PAIN_FUNCTIONAL_ASSESSMENT: 0-10

## 2024-06-22 NOTE — CARE PLAN
Problem: Pain  Goal: Takes deep breaths with improved pain control throughout the shift  Outcome: Progressing  Goal: Turns in bed with improved pain control throughout the shift  Outcome: Progressing  Goal: Walks with improved pain control throughout the shift  Outcome: Progressing  Goal: Performs ADL's with improved pain control throughout shift  Outcome: Progressing  Goal: Participates in PT with improved pain control throughout the shift  Outcome: Progressing  Goal: Free from opioid side effects throughout the shift  Outcome: Progressing  Goal: Free from acute confusion related to pain meds throughout the shift  Outcome: Progressing     Problem: Pain - Adult  Goal: Verbalizes/displays adequate comfort level or baseline comfort level  Outcome: Progressing     Problem: Safety - Adult  Goal: Free from fall injury  Outcome: Progressing     Problem: Discharge Planning  Goal: Discharge to home or other facility with appropriate resources  Outcome: Progressing     Problem: Chronic Conditions and Co-morbidities  Goal: Patient's chronic conditions and co-morbidity symptoms are monitored and maintained or improved  Outcome: Progressing

## 2024-06-22 NOTE — PROGRESS NOTES
Subjective     Interval events:    No acute events overnight. She endorses occasional blood streaks in her sputum, but that it is much improved. No recurrence of hemoptysis.     Objective     Vitals:  Vitals:    06/22/24 0552   BP: 107/55   Pulse: 75   Resp: 18   Temp: 36.4 °C (97.5 °F)   SpO2: 100%       I/O last 3 completed shifts:  In: 1714.3 (21.5 mL/kg) [P.O.:1380; I.V.:334.3 (4.2 mL/kg)]  Out: - (0 mL/kg)   Weight: 79.8 kg   I/O this shift:  In: 960 [P.O.:900; I.V.:60]  Out: -     Physical exam:  Constitutional: Well-developed female in no acute distress.  HEENT: Normocephalic, atraumatic. PERRL. EOMI. No cervical lymphadenopathy.  Respiratory: CTA bilaterally. No wheezes, rales, or rhonchi. Normal respiratory effort.  Cardiovascular: RRR. No murmurs, gallops, or rubs. No JVD. Radial pulses 2+.  Abdominal: Soft, nondistended, nontender to palpation. Bowel sounds present. No hepatosplenomegaly or masses. No CVA tenderness.  Neuro: CN II-XII intact. UE and LE strength 5/5 bilaterally and sensation intact. Normal FTN testing.  MSK: No LE edema bilaterally.  Skin: Warm, dry. No rashes or wounds.  Psych: Appropriate mood and affect.    Medications:  buPROPion XL, 150 mg, oral, q AM  [START ON 6/23/2024] cholecalciferol, 50,000 Units, oral, Every Sunday  cyanocobalamin, 100 mcg, oral, Daily  dilTIAZem CD, 120 mg, oral, Daily  DULoxetine, 60 mg, oral, Daily  [START ON 6/23/2024] enoxaparin, 1 mg/kg, subcutaneous, q12h DARLYN  folic acid, 1 mg, oral, Daily  gabapentin, 600 mg, oral, TID  pantoprazole, 40 mg, oral, q AM  warfarin, 3 mg, oral, Daily      heparin, 0-4,500 Units/hr, Last Rate: 1,000 Units/hr (06/22/24 1030)      PRN medications: HYDROmorphone, ipratropium-albuteroL, melatonin, oxyCODONE, polyethylene glycol    Labs:  Results for orders placed or performed during the hospital encounter of 06/18/24 (from the past 24 hour(s))   CBC and Auto Differential   Result Value Ref Range    WBC 5.6 4.4 - 11.3 x10*3/uL     nRBC 0.0 0.0 - 0.0 /100 WBCs    RBC 3.83 (L) 4.00 - 5.20 x10*6/uL    Hemoglobin 12.1 12.0 - 16.0 g/dL    Hematocrit 37.5 36.0 - 46.0 %    MCV 98 80 - 100 fL    MCH 31.6 26.0 - 34.0 pg    MCHC 32.3 32.0 - 36.0 g/dL    RDW 13.0 11.5 - 14.5 %    Platelets 263 150 - 450 x10*3/uL    Neutrophils % 51.7 40.0 - 80.0 %    Immature Granulocytes %, Automated 0.4 0.0 - 0.9 %    Lymphocytes % 32.3 13.0 - 44.0 %    Monocytes % 12.0 2.0 - 10.0 %    Eosinophils % 2.9 0.0 - 6.0 %    Basophils % 0.7 0.0 - 2.0 %    Neutrophils Absolute 2.89 1.20 - 7.70 x10*3/uL    Immature Granulocytes Absolute, Automated 0.02 0.00 - 0.70 x10*3/uL    Lymphocytes Absolute 1.80 1.20 - 4.80 x10*3/uL    Monocytes Absolute 0.67 0.10 - 1.00 x10*3/uL    Eosinophils Absolute 0.16 0.00 - 0.70 x10*3/uL    Basophils Absolute 0.04 0.00 - 0.10 x10*3/uL   Heparin Assay, UFH   Result Value Ref Range    Heparin Unfractionated 0.6 See Comment Below for Therapeutic Ranges IU/mL   Protime-INR   Result Value Ref Range    Protime 10.3 9.8 - 12.8 seconds    INR 0.9 0.9 - 1.1       Imaging:  [unfilled]         Assessment and plan:  Angle Martins is a 64 y.o. 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 She has been hemodynamically stable at rest but still hypoxic on walking. Her Hgb is stable (13)  with INR 1.5 and has had 3 more episodes here that amount seems to be decreased. We'll continue to monitor her respiration and keep holding warfarin/aspirin.    Updates 6/22:   - Hg stable at 12  - Continue home warfarin bridge, on 3 mg daily  - Per vascular med recs and patient preference, will complete remainder of bridge outpatient starting tomorrow with  lovenox 80 mg BID for 5 days with close INR monitoring   - No recurrence of hemoptysis      #Hemoptysis i/s/o elevated INR i/s/o ischemic hepatitis  :: stable at rest but still hypoxic (85%) on exertion  :: INR 1.5 ([facility]; 6.9 (6/17)<7.2 (6/16)<6.7 (6/15; last dose of warfarin)<2.6 (6/14))  :: stable Hgb (13)  - Vascular medicine consult  - Continue home warfarin bridge, on 3 mg daily  - Per vascular med recs and patient preference, will complete remainder of bridge outpatient starting tomorrow with lovenox 80 mg BID for 5 days with close INR monitoring   -daily CBC/INR     #Hx Arterial thromboembolism in RLE s/p RLE thrombectomy, angioplasty and stenting of the right SFA, AK (Above knee)-pop and TP (tibioperoneal) trunk angioplasty with 4 compartment fasciotomy in 12/2022  #Hx of RUE thromboembolism 2004 s/p embolectomy in [place] ([place])   :: follows with Dr. Munson from  vascular medicine/surgery  :: h/o HCY elevation, +elevated lipoprotein (A) x 1 (other check was normal); APLA - normal; VERA negative. ECHO - no PFO - fairly normal  -as above     #Ischemic hepatitis improving  :: viral hepatitis panel (A, B, C) neg  :: Although APCT with contrast was neg for infarction, clinical feature (elevated LDH, sudden intractable RUQ pain) was consistent with liver infarction  -continue to monitor hepatic function including INR     F: bolus prn  E: replete prn  N: regular  GI: pantoprazole 40  DVT: holding warfarin/aspirin, on SCD  A: PIVs  FULL code (confirmed on admission), Mic (Spouse) [phone]    Patient and plan discussed with attending physician Dr. Medina.    Emiliana Ramsey MD  PGY-1 Internal Medicine

## 2024-06-22 NOTE — HOSPITAL COURSE
Angle Martins is a 64 y.o. 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 Of note, patient was recently hospitalized for intractable RUQ abdominal pain and was found to have elevated liver enzyme (1379/1367) and LDH c/f liver infarction given her Hx of thromboembolism. This improved with conservative management and she was discharged 6/17. INR during previous admission was noted to be as high as 7.2, but decrease to 1.5 at time of presentation here.     Patient was noted to have gradually decreased amounts of hemoptysis, and never became acutely anemic (Hg stable 12-13) or had HD instability associated with this. Vascular medicine was consulted for management of anticoagulation. As hemoptysis decreased, patient was slowly trial on anticoagulation and was able to be transitioned from low intensity heparin to high intensity heparin to coumadin without reoccurrence of bleeding. Patient to complete remainder of bridge to warfarin with lovenox at home. She was discharged home in stable condition with close Coumadin clinic and vascular med follow up.

## 2024-06-23 ENCOUNTER — PHARMACY VISIT (OUTPATIENT)
Dept: PHARMACY | Facility: CLINIC | Age: 64
End: 2024-06-23
Payer: COMMERCIAL

## 2024-06-23 VITALS
WEIGHT: 176 LBS | RESPIRATION RATE: 18 BRPM | SYSTOLIC BLOOD PRESSURE: 95 MMHG | OXYGEN SATURATION: 93 % | BODY MASS INDEX: 30.05 KG/M2 | HEIGHT: 64 IN | DIASTOLIC BLOOD PRESSURE: 59 MMHG | HEART RATE: 69 BPM | TEMPERATURE: 97.3 F

## 2024-06-23 LAB
ANCA AB PATTERN SER IF-IMP: NORMAL
ANCA IGG TITR SER IF: NORMAL {TITER}
ERYTHROCYTE [DISTWIDTH] IN BLOOD BY AUTOMATED COUNT: 13.1 % (ref 11.5–14.5)
HCT VFR BLD AUTO: 39.7 % (ref 36–46)
HGB BLD-MCNC: 12.8 G/DL (ref 12–16)
INR PPP: 0.9 (ref 0.9–1.1)
MCH RBC QN AUTO: 32 PG (ref 26–34)
MCHC RBC AUTO-ENTMCNC: 32.2 G/DL (ref 32–36)
MCV RBC AUTO: 99 FL (ref 80–100)
MYELOPEROXIDASE AB SER-ACNC: 0 AU/ML (ref 0–19)
NRBC BLD-RTO: 0 /100 WBCS (ref 0–0)
PLATELET # BLD AUTO: 277 X10*3/UL (ref 150–450)
PROTEINASE3 AB SER-ACNC: 0 AU/ML (ref 0–19)
PROTHROMBIN TIME: 10.1 SECONDS (ref 9.8–12.8)
RBC # BLD AUTO: 4 X10*6/UL (ref 4–5.2)
UFH PPP CHRO-ACNC: 0.6 IU/ML
WBC # BLD AUTO: 6.4 X10*3/UL (ref 4.4–11.3)

## 2024-06-23 PROCEDURE — 85610 PROTHROMBIN TIME: CPT

## 2024-06-23 PROCEDURE — 85027 COMPLETE CBC AUTOMATED: CPT

## 2024-06-23 PROCEDURE — 2500000001 HC RX 250 WO HCPCS SELF ADMINISTERED DRUGS (ALT 637 FOR MEDICARE OP)

## 2024-06-23 PROCEDURE — 99239 HOSP IP/OBS DSCHRG MGMT >30: CPT

## 2024-06-23 PROCEDURE — RXMED WILLOW AMBULATORY MEDICATION CHARGE

## 2024-06-23 PROCEDURE — 2500000004 HC RX 250 GENERAL PHARMACY W/ HCPCS (ALT 636 FOR OP/ED)

## 2024-06-23 PROCEDURE — 36415 COLL VENOUS BLD VENIPUNCTURE: CPT

## 2024-06-23 PROCEDURE — 2500000002 HC RX 250 W HCPCS SELF ADMINISTERED DRUGS (ALT 637 FOR MEDICARE OP, ALT 636 FOR OP/ED)

## 2024-06-23 PROCEDURE — 85520 HEPARIN ASSAY: CPT

## 2024-06-23 RX ORDER — WARFARIN 3 MG/1
3 TABLET ORAL NIGHTLY
Qty: 30 TABLET | Refills: 3 | Status: SHIPPED | OUTPATIENT
Start: 2024-06-23

## 2024-06-23 RX ORDER — OXYCODONE HYDROCHLORIDE 10 MG/1
10 TABLET ORAL EVERY 8 HOURS PRN
Qty: 10 TABLET | Refills: 0 | Status: SHIPPED | OUTPATIENT
Start: 2024-06-23

## 2024-06-23 ASSESSMENT — COGNITIVE AND FUNCTIONAL STATUS - GENERAL
DAILY ACTIVITIY SCORE: 24
MOBILITY SCORE: 24

## 2024-06-23 ASSESSMENT — PAIN SCALES - GENERAL
PAINLEVEL_OUTOF10: 7
PAINLEVEL_OUTOF10: 7

## 2024-06-23 ASSESSMENT — PAIN - FUNCTIONAL ASSESSMENT: PAIN_FUNCTIONAL_ASSESSMENT: 0-10

## 2024-06-23 ASSESSMENT — PAIN DESCRIPTION - ORIENTATION: ORIENTATION: RIGHT

## 2024-06-23 ASSESSMENT — PAIN DESCRIPTION - LOCATION: LOCATION: LEG

## 2024-06-23 NOTE — DISCHARGE SUMMARY
Discharge Diagnosis  Hemoptysis    Issues Requiring Follow-Up  - Coumadin clinic follow up arranged. Pt will continue lovenox 80 mg BID for 5 days or until INR at goal (2-3) for 2 days in a row  - Follow up with vascular medicine, pt follows with Dr. Munson  - Pain medicine follow up, pt cautioned to avoid Norco given recent liver dysfunction     Test Results Pending At Discharge  Pending Labs       Order Current Status    Serum Protein Electrophoresis + Immunofixation In process    Serum Protein Electrophoresis + Immunofixation In process            Hospital Course  Angle Martins is a 64 y.o. 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 Of note, patient was recently hospitalized for intractable RUQ abdominal pain and was found to have elevated liver enzyme (1379/1367) and LDH c/f liver infarction given her Hx of thromboembolism. This improved with conservative management and she was discharged 6/17. INR during previous admission was noted to be as high as 7.2, but decrease to 1.5 at time of presentation here.     Patient was noted to have gradually decreased amounts of hemoptysis, and never became acutely anemic (Hg stable 12-13) or had HD instability associated with this. Vascular medicine was consulted for management of anticoagulation. As hemoptysis decreased, patient was slowly trial on anticoagulation and was able to be transitioned from low intensity heparin to high intensity heparin to coumadin without reoccurrence of bleeding. Patient to complete remainder of bridge to warfarin with lovenox at home. She was discharged home in stable condition with close Coumadin clinic and vascular med follow up.     Pertinent  Physical Exam At Time of Discharge  Constitutional: Well-developed female in no acute distress.  HEENT: Normocephalic, atraumatic. PERRL. EOMI. No cervical lymphadenopathy.  Respiratory: CTA bilaterally. No wheezes, rales, or rhonchi. Normal respiratory effort.  Cardiovascular: RRR. No murmurs, gallops, or rubs. No JVD. Radial pulses 2+.  Abdominal: Soft, nondistended, nontender to palpation. Bowel sounds present. No hepatosplenomegaly or masses. No CVA tenderness.  Neuro: CN II-XII intact. UE and LE strength 5/5 bilaterally and sensation intact. Normal FTN testing.  MSK: No LE edema bilaterally.  Skin: Warm, dry. No rashes or wounds.  Psych: Appropriate mood and affect.    Home Medications     Medication List      START taking these medications     atorvastatin 80 mg tablet; Commonly known as: Lipitor; Take 1 tablet (80   mg) by mouth once daily at bedtime.   enoxaparin 80 mg/0.8 mL syringe; Commonly known as: Lovenox; Inject 0.8   mL (80 mg) under the skin every 12 hours for 5 days. Do not fill before   June 23, 2024.   oxyCODONE 10 mg immediate release tablet; Commonly known as: Roxicodone;   Take 1 tablet (10 mg) by mouth every 8 hours if needed for severe pain (7   - 10).     CHANGE how you take these medications     buPROPion  mg 24 hr tablet; Commonly known as: Wellbutrin XL;   Please take 1 tablet by mouth with BREAKFAST every morning.  Do not crush,   chew, or split.; What changed: how much to take, how to take this, when to   take this, additional instructions   warfarin 3 mg tablet; Commonly known as: Coumadin; Take as directed. If   you are unsure how to take this medication, talk to your nurse or doctor.;   Original instructions: Take 1 tablet (3 mg) by mouth once daily at   bedtime. Take as directed per After Visit Summary.; What changed: how much   to take, how to take this, when to take this, additional instructions     CONTINUE taking these medications     alpha lipoic acid 600 mg capsule    aspirin 81 mg chewable tablet   cholecalciferol 50,000 unit capsule; Commonly known as: Vitamin D-3;   TAKE 1 CAPSULE Weekly SUNDAYS PLEASE with food and full glass water. Thank   you   cyanocobalamin 100 mcg tablet; Commonly known as: Vitamin B-12; Please   take 1 tablet by mouth with LUNCH every day.  Thank you.   dilTIAZem  mg 24 hr capsule; Commonly known as: Dilacor XR; Take 1   capsule (120 mg) by mouth once daily. Take 1 capsule once daily.   DULoxetine 60 mg DR capsule; Commonly known as: Cymbalta   folic acid 1 mg tablet; Commonly known as: Folvite; Please take 1 tablet   by mouth with lunch every day.  Thank you.   gabapentin 600 mg tablet; Commonly known as: Neurontin; Take 1 tablet   (600 mg) by mouth 3 times a day.   HYDROcodone-acetaminophen 5-325 mg tablet; Commonly known as: Norco;   Take 2 tablets by mouth 2 times a day as needed for severe pain (7 - 10).   Do not fill before June 21, 2024.   ipratropium-albuteroL 0.5-2.5 mg/3 mL nebulizer solution; Commonly known   as: Duo-Neb   ketamine in 0.9 % sod chloride 10 mg/mL solution   melatonin 5 mg tablet   naloxone 4 mg/0.1 mL nasal spray; Commonly known as: Narcan; Administer   1 spray (4 mg) into affected nostril(s) if needed for opioid reversal. May   repeat every 2-3 minutes if needed, alternating nostrils, until medical   assistance becomes available.   pantoprazole 40 mg EC tablet; Commonly known as: ProtoNix   polyethylene glycol 17 gram packet; Commonly known as: Glycolax, Miralax   traZODone 50 mg tablet; Commonly known as: Desyrel       Outpatient Follow-Up  Future Appointments   Date Time Provider Department Center   6/28/2024  2:00 PM INF 04 PARM OPCTR PAROPCINF [facility]   7/2/2024 11:15 AM Nestor Montero MD [id number] [facility]   8/8/2024 12:30 PM INF 01 PARM OPCTR PAROPCINF [facility]   8/14/2024 11:20 AM Stephani Quiroga MD [id number] [facility]   9/3/2024  1:00 PM Jose Armando Velazquez MD PAROPCPNM [facility]   9/10/2024  1:00 PM INF 03 PARM OPCTR PAROPCINF [facility]    2/18/2025  8:00 AM CHANDNI ANNETTA 107 VASCULAR 1 [id number] [facility]   2/18/2025  9:00 AM Lisset Cash MD [id number] [facility]       Emiliana Ramsey MD

## 2024-06-23 NOTE — DISCHARGE INSTRUCTIONS
Neva Ms. Martins,    You were admitted after you were coughing up blood, this was likely due to your INR being so high in the setting of your liver injury during your earlier hospitalization. We think this is likely a slow bronchial bleed that healed. We bridged you from heparin to warfarin slowly and your blood counts remained stable. We can send you home on Lovenox shots twice a day to complete your bridge at home.     It was our pleasure taking care of you! Please follow up with your primary care provider and your additional scheduled appointments as listed below. Please continue taking all your home medications with the changes listed below.     Medication Changes:   ~Will need a minimum of 5 days of overlap therapy with Lovenox (80 mg twice a day) and Warfarin (3 mg daily); goal INR is 2-3; when INR is >2 for 2 days in a row, Lovenox can be stopped    Follow Up Appointments:   ~Coumadin Clinic appointment at the [facility], please arrange appointment for 1-2 days after discharge  ~Scheduled outpatient follow up with Dr. Quiroga on 8/14/24 at 11:20 am at the [facility] (please call and try to arrange for earlier follow up sometime this week)     Your  Care Team

## 2024-06-24 ENCOUNTER — DOCUMENTATION (OUTPATIENT)
Dept: PRIMARY CARE | Facility: CLINIC | Age: 64
End: 2024-06-24
Payer: MEDICARE

## 2024-06-24 ENCOUNTER — ANTICOAGULATION - WARFARIN VISIT (OUTPATIENT)
Dept: CARDIOLOGY | Facility: CLINIC | Age: 64
End: 2024-06-24
Payer: MEDICARE

## 2024-06-24 ENCOUNTER — TELEPHONE (OUTPATIENT)
Dept: PAIN MEDICINE | Facility: CLINIC | Age: 64
End: 2024-06-24
Payer: MEDICARE

## 2024-06-24 ENCOUNTER — PATIENT OUTREACH (OUTPATIENT)
Dept: CARE COORDINATION | Facility: CLINIC | Age: 64
End: 2024-06-24
Payer: MEDICARE

## 2024-06-24 DIAGNOSIS — Z86.718 HISTORY OF DEEP VEIN THROMBOSIS: ICD-10-CM

## 2024-06-24 DIAGNOSIS — I74.9 ARTERIAL EMBOLISM (MULTI): Primary | ICD-10-CM

## 2024-06-24 DIAGNOSIS — I73.9 PAD (PERIPHERAL ARTERY DISEASE) (CMS-HCC): ICD-10-CM

## 2024-06-24 LAB
ALBUMIN: 3.4 G/DL (ref 3.4–5)
ALPHA 1 GLOBULIN: 0.4 G/DL (ref 0.2–0.6)
ALPHA 2 GLOBULIN: 0.7 G/DL (ref 0.4–1.1)
BETA GLOBULIN: 0.9 G/DL (ref 0.5–1.2)
GAMMA GLOBULIN: 0.5 G/DL (ref 0.5–1.4)
IMMUNOFIXATION COMMENT: NORMAL
PATH REVIEW - SERUM IMMUNOFIXATION: NORMAL
PATH REVIEW-SERUM PROTEIN ELECTROPHORESIS: NORMAL
PROTEIN ELECTROPHORESIS COMMENT: NORMAL

## 2024-06-24 NOTE — PROGRESS NOTES
Discharge Facility: [facility]   Discharge Diagnosis: Hemoptysis   Admission Date: 6/18/24  Discharge Date: 6/23/24    PCP Appointment Date: MD DARCIE Castro/tasked to office                                      Specialist Appointment Date: Pain Med Dr Velazquez 7/1/24  Ortho Surg Dr Montero 7/2/24  Hospital Encounter and Summary: Linked   See discharge assessment below for further details     Engagement  Call Start Time: 1405 (6/24/2024  2:05 PM)    Medications  Medications reviewed with patient/caregiver?: Yes (6/24/2024  2:05 PM)  Is the patient having any side effects they believe may be caused by any medication additions or changes?: No (6/24/2024  2:05 PM)  Does the patient have all medications ordered at discharge?: Yes (6/24/2024  2:05 PM)  Care Management Interventions: No intervention needed (6/24/2024  2:05 PM)  Prescription Comments: atorvastatin (Lipitor)  enoxaparin (Lovenox)  oxyCODONE (Roxicodone) (6/24/2024  2:05 PM)  Is the patient taking all medications as directed (includes completed medication regime)?: Yes (6/24/2024  2:05 PM)  Care Management Interventions: Provided patient education (6/24/2024  2:05 PM)  Medication Comments: no issues obtaining medications (6/24/2024  2:05 PM)    Appointments  Does the patient have a primary care provider?: Yes (6/24/2024  2:05 PM)  Care Management Interventions: Educated patient on importance of making appointment; Advised patient to make appointment (MD DARCIE Castro/tasked to office) (6/24/2024  2:05 PM)  Has the patient kept scheduled appointments due by today?: Yes (6/24/2024  2:05 PM)  Care Management Interventions: Advised patient to keep appointment (6/24/2024  2:05 PM)    Self Management  What is the home health agency?: denies need (6/24/2024  2:05 PM)  What Durable Medical Equipment (DME) was ordered?: denies need (6/24/2024  2:05 PM)    Patient Teaching  Does the patient have access to their discharge  instructions?: Yes (6/24/2024  2:05 PM)  Care Management Interventions: Reviewed instructions with patient (6/24/2024  2:05 PM)  What is the patient's perception of their health status since discharge?: Improving (6/24/2024  2:05 PM)  Is the patient/caregiver able to teach back the hierarchy of who to call/visit for symptoms/problems? PCP, Specialist, Home Health nurse, Urgent Care, ED, 911: Yes (6/24/2024  2:05 PM)    Wrap Up  Wrap Up Additional Comments: This CM spoke with patient via phone. Successful transition of care outreach complete. Pt reports doing well at home since discharge. New meds reviewed. Pt denies CP and SOB. Pt denies any bleeding. No difficulty giving self Lovenox injections. Patient denies any further discharge questions/needs at this time. Emphasized that Follow up is needed after discharge to review the hospital recommendations, assess your response to your treatment.  Pt aware of my availability for non-emergent concerns. Contact info provided to patient. (6/24/2024  2:05 PM)

## 2024-06-24 NOTE — TELEPHONE ENCOUNTER
Spoke to pt who is currently discharged to home and back on coumadin and bridging with lovenox inj.  She agreed to come in for an INR on Wed 6/26.

## 2024-06-24 NOTE — TELEPHONE ENCOUNTER
PT CAN'T HAVE TYLENOL PRODUCTS DUE TO BLOOD CLOT IN LIVER. HER PAIN MED HAS TYLENOL IN IT AND NEEDS NEW MEDICATION.

## 2024-06-25 ENCOUNTER — PATIENT MESSAGE (OUTPATIENT)
Dept: PAIN MEDICINE | Facility: CLINIC | Age: 64
End: 2024-06-25
Payer: MEDICARE

## 2024-06-25 DIAGNOSIS — M79.2 NEUROPATHIC PAIN: Primary | ICD-10-CM

## 2024-06-25 RX ORDER — HYDROCODONE BITARTRATE 10 MG/1
10 CAPSULE, EXTENDED RELEASE ORAL EVERY 12 HOURS SCHEDULED
Qty: 60 EACH | Refills: 0 | Status: SHIPPED | OUTPATIENT
Start: 2024-06-28 | End: 2024-06-26 | Stop reason: ALTCHOICE

## 2024-06-26 ENCOUNTER — ANTICOAGULATION - WARFARIN VISIT (OUTPATIENT)
Dept: CARDIOLOGY | Facility: CLINIC | Age: 64
End: 2024-06-26
Payer: MEDICARE

## 2024-06-26 DIAGNOSIS — Z86.718 HISTORY OF DEEP VEIN THROMBOSIS: ICD-10-CM

## 2024-06-26 DIAGNOSIS — I74.9 ARTERIAL EMBOLISM (MULTI): Primary | ICD-10-CM

## 2024-06-26 DIAGNOSIS — I74.9 ARTERIAL EMBOLISM (MULTI): ICD-10-CM

## 2024-06-26 DIAGNOSIS — I73.9 PAD (PERIPHERAL ARTERY DISEASE) (CMS-HCC): ICD-10-CM

## 2024-06-26 LAB
POC INR: 1.2
POC PROTHROMBIN TIME: NORMAL

## 2024-06-26 PROCEDURE — 99211 OFF/OP EST MAY X REQ PHY/QHP: CPT

## 2024-06-26 PROCEDURE — 85610 PROTHROMBIN TIME: CPT | Mod: QW

## 2024-06-26 RX ORDER — ENOXAPARIN SODIUM 100 MG/ML
INJECTION SUBCUTANEOUS
Qty: 9.6 ML | Refills: 0 | Status: SHIPPED | OUTPATIENT
Start: 2024-06-26

## 2024-06-26 NOTE — PROGRESS NOTES
Patient identification verified with 2 identifiers.    Location: [facility] - [address] [place] [address] [phone]     Referring Physician: Dr. Stephan Martins  Enrollment/ Re-enrollment date: 4/26/2025.    INR Goal: 2.0-3.0  INR monitoring is per [facility] protocol.  Anticoagulation Medication: warfarin  Indication: Arterial Embolism, DVT and PAD    Subjective   Bleeding signs/symptoms: No  Bruising: No   Major bleeding event: No  Thrombosis signs/symptoms: No  Thromboembolic event: Yes.  Pt was recently hospitalized with possible liver infarct and coughing up blood clots.  Pt reports that INR was >7 at [facility].  She says that this was untreated but her coumadin was held. She resumed coumadin at usual dose on 6/22/24.  Missed doses: No  Extra doses: No  Medication changes: No  Dietary changes: No  Change in health: No  Change in activity: No  Alcohol: No  Other concerns: No    Upcoming Procedures:  Does the Patient Have any upcoming procedures that require interruption in anticoagulation therapy? no  Does the patient require bridging? yes    Sent secure chat to Carol Meier and Kimber:  Hello there. I am seeing this patient in the coumadin clinic who was recently seen by both of you in the hospital. She was discharged on lovenox inj 80mg BID and resumed her warfarin 3mg/day approx 5 days ago. Her INR today is 1.2. She only has enough lovenox for one more day. Can one of you send in a refill to [address] [place] for her? Thanks so much   Response from Dr. Martins:   I can send over for 1 week. Goal is usually overlap of 1 day therapeutic INR unless she has increased bleeding/bruising. Will that tide her over?   I sent for 7 days max.  Please remind the patient that once she is therapeutic, overlap 2 more doses, then stop.  Watch out for excessive bleeding.  Thanks, Magui!  Anticoagulation Summary  As of 6/26/2024      INR goal:  2.0-3.0   TTR:  59.8% (8.6 mo)   INR  used for dosin.20 (2024)   Weekly warfarin total:  21 mg               Assessment/Plan   Subtherapeutic     1. New dose: no change.  Will maintain current coumadin dose of 21mg warfarin weekly and have pt continue lovenox inj 80mg BID.  Will not give additional dosing due to recent hospitalization with bleeding/clotting issues.  2. Next INR:  5 days  Pt unable to RTC on Friday due to infusion appt.      Education provided to patient during the visit:  Patient instructed to call in interim with questions, concerns and changes.   Patient educated on interactions between medications and warfarin.   Patient educated on dietary consistency in vitamin k consumption.   Patient educated on affects of alcohol consumption while taking warfarin.   Patient educated on signs of bleeding/clotting.   Patient educated on compliance with dosing, follow up appointments, and prescribed plan of care.

## 2024-06-27 ENCOUNTER — OFFICE VISIT (OUTPATIENT)
Dept: PRIMARY CARE | Facility: CLINIC | Age: 64
End: 2024-06-27
Payer: MEDICARE

## 2024-06-27 VITALS
WEIGHT: 174 LBS | BODY MASS INDEX: 29.71 KG/M2 | SYSTOLIC BLOOD PRESSURE: 105 MMHG | OXYGEN SATURATION: 96 % | HEIGHT: 64 IN | HEART RATE: 79 BPM | DIASTOLIC BLOOD PRESSURE: 72 MMHG

## 2024-06-27 DIAGNOSIS — E78.2 MIXED HYPERLIPIDEMIA: ICD-10-CM

## 2024-06-27 DIAGNOSIS — F33.1 MAJOR DEPRESSIVE DISORDER, RECURRENT EPISODE, MODERATE DEGREE (MULTI): ICD-10-CM

## 2024-06-27 DIAGNOSIS — E53.8 VITAMIN B12 DEFICIENCY: ICD-10-CM

## 2024-06-27 DIAGNOSIS — E55.9 VITAMIN D DEFICIENCY: ICD-10-CM

## 2024-06-27 DIAGNOSIS — D52.9 ANEMIA DUE TO FOLIC ACID DEFICIENCY, UNSPECIFIED DEFICIENCY TYPE: ICD-10-CM

## 2024-06-27 DIAGNOSIS — D68.9 COAGULOPATHY (MULTI): Primary | ICD-10-CM

## 2024-06-27 DIAGNOSIS — K72.00 ACUTE LIVER FAILURE WITHOUT HEPATIC COMA (HHS-HCC): ICD-10-CM

## 2024-06-27 DIAGNOSIS — I73.9 PAD (PERIPHERAL ARTERY DISEASE) (CMS-HCC): ICD-10-CM

## 2024-06-27 DIAGNOSIS — M54.16 LUMBAR RADICULAR PAIN: ICD-10-CM

## 2024-06-27 PROCEDURE — 3078F DIAST BP <80 MM HG: CPT | Performed by: INTERNAL MEDICINE

## 2024-06-27 PROCEDURE — 3074F SYST BP LT 130 MM HG: CPT | Performed by: INTERNAL MEDICINE

## 2024-06-27 PROCEDURE — 3008F BODY MASS INDEX DOCD: CPT | Performed by: INTERNAL MEDICINE

## 2024-06-27 PROCEDURE — 99495 TRANSJ CARE MGMT MOD F2F 14D: CPT | Performed by: INTERNAL MEDICINE

## 2024-06-27 PROCEDURE — 1036F TOBACCO NON-USER: CPT | Performed by: INTERNAL MEDICINE

## 2024-06-27 RX ORDER — ONDANSETRON 4 MG/1
TABLET, ORALLY DISINTEGRATING ORAL
Qty: 45 TABLET | Refills: 0 | Status: SHIPPED | OUTPATIENT
Start: 2024-06-27

## 2024-06-27 RX ORDER — NALOXONE HYDROCHLORIDE 4 MG/.1ML
1 SPRAY NASAL AS NEEDED
Qty: 2 EACH | Refills: 0 | Status: SHIPPED | OUTPATIENT
Start: 2024-06-27

## 2024-06-27 RX ORDER — FAMOTIDINE 20 MG/1
TABLET, FILM COATED ORAL
Qty: 180 TABLET | Refills: 1 | Status: SHIPPED | OUTPATIENT
Start: 2024-06-27

## 2024-06-27 RX ORDER — BUPROPION HYDROCHLORIDE 150 MG/1
TABLET ORAL
Qty: 30 TABLET | Refills: 0 | Status: SHIPPED | OUTPATIENT
Start: 2024-06-27

## 2024-06-27 RX ORDER — DULOXETIN HYDROCHLORIDE 60 MG/1
60 CAPSULE, DELAYED RELEASE ORAL DAILY
Qty: 30 CAPSULE | Refills: 0 | Status: SHIPPED | OUTPATIENT
Start: 2024-06-27

## 2024-06-27 NOTE — PROGRESS NOTES
Subjective   Patient ID: Angle Martins is a 64 y.o. female who presents for Hospital Follow-up (Patient presented today for a hospital follow up.).    HPI   Unremarkable interval history noted, with acute liver injury, initial hospitalization, some residual symptoms, with second hospitalization.  Patient then discharged, and with questions regarding medications, I was called to fill her ENOXAPARIN BRIDGE regimen.  She has been compliant with medications, tolerating regimens.  No gum or nose bleeding.  No particular easy bruisability, with the patient being careful.  Likewise, some nausea perhaps, but some slow improvement of appetite, and no abdominal distress.  No diarrhea, no constipation.  No apparent blood in stool.  Likewise, no jaundice, and no discoloration of urine or stool.    The patient is trying to manage without using any ACETAMINOPHEN.  Following closely with her chronic pain specialist.  Fatigue, but slowly increasing physical activity.  No dyan substernal chest pain, no orthopnea, no paroxysmal nocturnal dyspnea.  No particular cough, no particular sputum production.  Continues to want to improve quality of life, and does not wish harm to self or others.  No headache, blurred vision, diplopia.  No dysphagia.  Some residual ataxia, but no recent falls.    No dysuria, flank, suprapubic pain.  No discharge, no pruritus.  No rash.  No malodor.  No hesitancy, no feeling of incomplete voiding.  ENDOCRINE with no polyuria, polydipsia, polyphagia.  No blurred vision.  No skin, hair, nail changes.  She has lost some significant weight, and would like to keep her figure while staying healthy.  No particular cough, no particular sputum production.  CONSTITUTIONALLY, no fever, no chills.  No night sweats.  No lingering anorexia or nausea.  No apparent lymphadenopathy.  No apparent weight loss.        Review of Systems  Review of systems as in history of present illness, and otherwise, reviewed separately as  "well, and was unremarkable/negative/noncontributory.        Objective   /72 (BP Location: Left arm, Patient Position: Sitting, BP Cuff Size: Adult)   Pulse 79   Ht 1.626 m (5' 4\")   Wt 78.9 kg (174 lb)   SpO2 96%   BMI 29.87 kg/m²     Physical Exam  Currently, not in distress or diaphoresis.  Mildly anxious, but otherwise, alert, oriented x 3.  Amiable.  Not unkempt.  Receptive, cheerful, appropriate, and eager to improve quality of life.  Not wishing harm to self or others.  Moderately built.    HEAD pink palpebral conjunctivae, anicteric sclerae.  Mucous membranes moist.  NECK supple, no apparent jugular venous distention.  CARDIOVASCULAR not in distress or diaphoresis.  No bipedal edema.  Regular rate and rhythm.  No murmurs appreciated.  LUNGS not in distress or diaphoresis.  Not using accessory muscles.  Clear to auscultation bilaterally.  ABDOMEN soft, nontender.  BACK no costovertebral angle tenderness.  EXTREMITIES no clubbing, no cyanosis.  SKIN with no breakdown, bleeding, jaundice.  Subungual bruise noted, middle toe, left.  NEURO no facial asymmetry.  No apparent cranial nerve deficits.  Romberg negative.  Ambulating without need of assistance.  No apparent focal weakness.  No tremors.  PSYCH receptive, appropriate, and eager to maintain and improve quality of life.        LABORATORY results reviewed, initially from [facility], then from admission to [facility].  Interval care, Coumadin Clinic, transitional care note also reviewed.        Assessment/Plan   Diagnoses and all orders for this visit:  Coagulopathy (Multi)  -     CBC and Auto Differential; Future  -     Comprehensive Metabolic Panel; Future  -     Follow Up In Primary Care - Established; Future  -     famotidine (Pepcid) 20 mg tablet; Please take 1 tablet by mouth before breakfast time, and again 1 tablet by mouth before suppertime.  Please take twice a day even if you are not going to eat.  Thank you.  Acute liver failure without " hepatic coma (HHS-HCC)  -     CBC and Auto Differential; Future  -     Comprehensive Metabolic Panel; Future  -     Amylase; Future  -     Lipase; Future  -     Ferritin; Future  -     Follow Up In Primary Care - Established; Future  -     famotidine (Pepcid) 20 mg tablet; Please take 1 tablet by mouth before breakfast time, and again 1 tablet by mouth before suppertime.  Please take twice a day even if you are not going to eat.  Thank you.  -     ondansetron ODT (Zofran-ODT) 4 mg disintegrating tablet; Please melt on tongue every 6-8 hours as needed for nausea.  Lots of fluids throughout the day.  Thank you.  PAD (peripheral artery disease) (CMS-HCC)  -     naloxone (Narcan) 4 mg/0.1 mL nasal spray; Administer 1 spray (4 mg) into affected nostril(s) if needed for opioid reversal. May repeat every 2-3 minutes if needed, alternating nostrils, until medical assistance becomes available.  -     CBC and Auto Differential; Future  -     Comprehensive Metabolic Panel; Future  -     TSH with reflex to Free T4 if abnormal; Future  -     Follow Up In Primary Care - Established; Future  Lumbar radicular pain  -     naloxone (Narcan) 4 mg/0.1 mL nasal spray; Administer 1 spray (4 mg) into affected nostril(s) if needed for opioid reversal. May repeat every 2-3 minutes if needed, alternating nostrils, until medical assistance becomes available.  -     DULoxetine (Cymbalta) 60 mg DR capsule; Take 1 capsule (60 mg) by mouth once daily. Do not crush or chew.  -     Comprehensive Metabolic Panel; Future  -     Vitamin B12; Future  -     Folate; Future  -     Follow Up In Primary Care - Established; Future  Major depressive disorder, recurrent episode, moderate degree (Multi)  -     buPROPion XL (Wellbutrin XL) 150 mg 24 hr tablet; Please take 1 tablet by mouth with BREAKFAST every morning.  Do not crush, chew, or split.  -     DULoxetine (Cymbalta) 60 mg DR capsule; Take 1 capsule (60 mg) by mouth once daily. Do not crush or  chew.  -     Comprehensive Metabolic Panel; Future  -     TSH with reflex to Free T4 if abnormal; Future  -     Follow Up In Primary Care - Established; Future  Mixed hyperlipidemia  -     Comprehensive Metabolic Panel; Future  -     Lipid Panel; Future  -     Follow Up In Primary Care - Established; Future  Anemia due to folic acid deficiency, unspecified deficiency type  -     CBC and Auto Differential; Future  -     Vitamin B12; Future  -     Folate; Future  -     Ferritin; Future  -     Iron and TIBC; Future  -     Follow Up In Primary Care - Established; Future  -     famotidine (Pepcid) 20 mg tablet; Please take 1 tablet by mouth before breakfast time, and again 1 tablet by mouth before suppertime.  Please take twice a day even if you are not going to eat.  Thank you.  Vitamin B12 deficiency  -     CBC and Auto Differential; Future  -     Vitamin B12; Future  -     Folate; Future  -     Follow Up In Primary Care - Established; Future  Vitamin D deficiency  -     Comprehensive Metabolic Panel; Future  -     Vitamin D 25-Hydroxy,Total (for eval of Vitamin D levels); Future  -     Follow Up In Primary Care - Established; Future       Thank you very much for coming.  I am very happy to see you.    PT/INR, FASTING blood examination Monday.    Remember, once your PT/INR is within ideal levels, you will receive ONLY 1 MORE dose of ENOXAPARIN/Lovenox, then no more.  Discard the remaining enoxaparin.  If you need more, please contact your specialist regarding further blood thinness.    Your weight is good.  Eat sensibly, but do not try to hard to lose weight.  You can stay where you are at the moment.    Please come back in 6 weeks.  You will benefit from reevaluation of your recovery, as well as your Medicare Wellness evaluation for the year.    I will send word sooner regarding results and possible changes.  I will text you information at that point.    Again, thank you very much for coming.  Please do not hesitate  to call with questions or concerns.  Please continue to take care of yourself and each other, and please continue to pray for our recovery from this pandemic.  Take care and God bless us!            0  Return in 6 weeks.  40 minutes please.  Medicare Wellness evaluation.  Reassess further recovery from acute illness.  Coordinate with specialists as patient is seen.  Review preventive strategies, cardiovascular risk, mood, energy, function.            0  TCM noted 06/24/2024.  Will change charge to [id number].  Will also continue to follow anticoagualtion and ENOXAPARIN overlap with WARFARIN.            0

## 2024-06-27 NOTE — PATIENT INSTRUCTIONS
Thank you very much for coming.  I am very happy to see you.    PT/INR, FASTING blood examination Monday.    Remember, once your PT/INR is within ideal levels, you will receive ONLY 1 MORE dose of ENOXAPARIN/Lovenox, then no more.  Discard the remaining enoxaparin.  If you need more, please contact your specialist regarding further blood thinness.    Your weight is good.  Eat sensibly, but do not try to hard to lose weight.  You can stay where you are at the moment.    Please come back in 6 weeks.  You will benefit from reevaluation of your recovery, as well as your Medicare Wellness evaluation for the year.    I will send word sooner regarding results and possible changes.  I will text you information at that point.    Again, thank you very much for coming.  Please do not hesitate to call with questions or concerns.  Please continue to take care of yourself and each other, and please continue to pray for our recovery from this pandemic.  Take care and God bless us!            0  Return in 6 weeks.  40 minutes please.  Medicare Wellness evaluation.  Reassess further recovery from acute illness.  Coordinate with specialists as patient is seen.  Review preventive strategies, cardiovascular risk, mood, energy, function.            0

## 2024-06-28 ENCOUNTER — APPOINTMENT (OUTPATIENT)
Dept: INFUSION THERAPY | Facility: CLINIC | Age: 64
End: 2024-06-28
Payer: MEDICARE

## 2024-06-28 DIAGNOSIS — R29.818 NEUROGENIC CLAUDICATION: ICD-10-CM

## 2024-06-28 DIAGNOSIS — M79.2 NEUROPATHIC PAIN: ICD-10-CM

## 2024-06-28 DIAGNOSIS — U09.9 POST-COVID CHRONIC COUGH: ICD-10-CM

## 2024-06-28 DIAGNOSIS — M54.16 LUMBAR RADICULAR PAIN: Primary | ICD-10-CM

## 2024-06-28 DIAGNOSIS — R05.3 POST-COVID CHRONIC COUGH: ICD-10-CM

## 2024-07-01 ENCOUNTER — LAB (OUTPATIENT)
Dept: LAB | Facility: LAB | Age: 64
End: 2024-07-01
Payer: MEDICARE

## 2024-07-01 ENCOUNTER — OFFICE VISIT (OUTPATIENT)
Dept: PAIN MEDICINE | Facility: CLINIC | Age: 64
End: 2024-07-01
Payer: MEDICARE

## 2024-07-01 ENCOUNTER — ANTICOAGULATION - WARFARIN VISIT (OUTPATIENT)
Dept: CARDIOLOGY | Facility: CLINIC | Age: 64
End: 2024-07-01
Payer: MEDICARE

## 2024-07-01 VITALS
RESPIRATION RATE: 18 BRPM | HEIGHT: 64 IN | DIASTOLIC BLOOD PRESSURE: 85 MMHG | BODY MASS INDEX: 29.71 KG/M2 | WEIGHT: 174 LBS | SYSTOLIC BLOOD PRESSURE: 132 MMHG | TEMPERATURE: 98.8 F | HEART RATE: 90 BPM | OXYGEN SATURATION: 98 %

## 2024-07-01 DIAGNOSIS — M54.16 LUMBAR RADICULAR PAIN: ICD-10-CM

## 2024-07-01 DIAGNOSIS — I73.9 PAD (PERIPHERAL ARTERY DISEASE) (CMS-HCC): ICD-10-CM

## 2024-07-01 DIAGNOSIS — E53.8 VITAMIN B12 DEFICIENCY: ICD-10-CM

## 2024-07-01 DIAGNOSIS — E78.2 MIXED HYPERLIPIDEMIA: ICD-10-CM

## 2024-07-01 DIAGNOSIS — D52.9 ANEMIA DUE TO FOLIC ACID DEFICIENCY, UNSPECIFIED DEFICIENCY TYPE: ICD-10-CM

## 2024-07-01 DIAGNOSIS — F33.1 MAJOR DEPRESSIVE DISORDER, RECURRENT EPISODE, MODERATE DEGREE (MULTI): ICD-10-CM

## 2024-07-01 DIAGNOSIS — E55.9 VITAMIN D DEFICIENCY: ICD-10-CM

## 2024-07-01 DIAGNOSIS — Z86.718 HISTORY OF DEEP VEIN THROMBOSIS: ICD-10-CM

## 2024-07-01 DIAGNOSIS — G89.4 CHRONIC PAIN SYNDROME: ICD-10-CM

## 2024-07-01 DIAGNOSIS — D68.9 COAGULOPATHY (MULTI): ICD-10-CM

## 2024-07-01 DIAGNOSIS — I74.9 ARTERIAL EMBOLISM (MULTI): Primary | ICD-10-CM

## 2024-07-01 DIAGNOSIS — K72.00 ACUTE LIVER FAILURE WITHOUT HEPATIC COMA (HHS-HCC): ICD-10-CM

## 2024-07-01 DIAGNOSIS — M54.16 LUMBAR RADICULAR PAIN: Primary | ICD-10-CM

## 2024-07-01 LAB
25(OH)D3 SERPL-MCNC: 61 NG/ML (ref 30–100)
ALBUMIN SERPL BCP-MCNC: 4.3 G/DL (ref 3.4–5)
ALP SERPL-CCNC: 89 U/L (ref 33–136)
ALT SERPL W P-5'-P-CCNC: 53 U/L (ref 7–45)
AMYLASE SERPL-CCNC: 28 U/L (ref 29–103)
ANION GAP SERPL CALC-SCNC: 12 MMOL/L (ref 10–20)
AST SERPL W P-5'-P-CCNC: 28 U/L (ref 9–39)
BASOPHILS # BLD AUTO: 0.04 X10*3/UL (ref 0–0.1)
BASOPHILS NFR BLD AUTO: 0.7 %
BILIRUB SERPL-MCNC: 0.4 MG/DL (ref 0–1.2)
BUN SERPL-MCNC: 12 MG/DL (ref 6–23)
CALCIUM SERPL-MCNC: 10.1 MG/DL (ref 8.6–10.3)
CHLORIDE SERPL-SCNC: 105 MMOL/L (ref 98–107)
CHOLEST SERPL-MCNC: 261 MG/DL (ref 0–199)
CHOLESTEROL/HDL RATIO: 4.1
CO2 SERPL-SCNC: 25 MMOL/L (ref 21–32)
CREAT SERPL-MCNC: 0.82 MG/DL (ref 0.5–1.05)
EGFRCR SERPLBLD CKD-EPI 2021: 81 ML/MIN/1.73M*2
EOSINOPHIL # BLD AUTO: 0.03 X10*3/UL (ref 0–0.7)
EOSINOPHIL NFR BLD AUTO: 0.5 %
ERYTHROCYTE [DISTWIDTH] IN BLOOD BY AUTOMATED COUNT: 13.2 % (ref 11.5–14.5)
FERRITIN SERPL-MCNC: 127 NG/ML (ref 8–150)
FOLATE SERPL-MCNC: 22.8 NG/ML
GLUCOSE SERPL-MCNC: 82 MG/DL (ref 74–99)
HCT VFR BLD AUTO: 38.9 % (ref 36–46)
HDLC SERPL-MCNC: 63.4 MG/DL
HGB BLD-MCNC: 13.2 G/DL (ref 12–16)
IMM GRANULOCYTES # BLD AUTO: 0.01 X10*3/UL (ref 0–0.7)
IMM GRANULOCYTES NFR BLD AUTO: 0.2 % (ref 0–0.9)
IRON SATN MFR SERPL: 34 % (ref 25–45)
IRON SERPL-MCNC: 124 UG/DL (ref 35–150)
LDLC SERPL CALC-MCNC: 182 MG/DL
LIPASE SERPL-CCNC: 250 U/L (ref 9–82)
LYMPHOCYTES # BLD AUTO: 2.15 X10*3/UL (ref 1.2–4.8)
LYMPHOCYTES NFR BLD AUTO: 36.6 %
MCH RBC QN AUTO: 33.3 PG (ref 26–34)
MCHC RBC AUTO-ENTMCNC: 33.9 G/DL (ref 32–36)
MCV RBC AUTO: 98 FL (ref 80–100)
MONOCYTES # BLD AUTO: 0.52 X10*3/UL (ref 0.1–1)
MONOCYTES NFR BLD AUTO: 8.8 %
NEUTROPHILS # BLD AUTO: 3.13 X10*3/UL (ref 1.2–7.7)
NEUTROPHILS NFR BLD AUTO: 53.2 %
NON HDL CHOLESTEROL: 198 MG/DL (ref 0–149)
NRBC BLD-RTO: 0 /100 WBCS (ref 0–0)
PLATELET # BLD AUTO: 295 X10*3/UL (ref 150–450)
POC INR: 1.4 (ref 2–3)
POC PROTHROMBIN TIME: ABNORMAL
POTASSIUM SERPL-SCNC: 4.4 MMOL/L (ref 3.5–5.3)
PROT SERPL-MCNC: 6.5 G/DL (ref 6.4–8.2)
RBC # BLD AUTO: 3.96 X10*6/UL (ref 4–5.2)
SODIUM SERPL-SCNC: 138 MMOL/L (ref 136–145)
TIBC SERPL-MCNC: 366 UG/DL (ref 240–445)
TRIGL SERPL-MCNC: 79 MG/DL (ref 0–149)
TSH SERPL-ACNC: 2.54 MIU/L (ref 0.44–3.98)
UIBC SERPL-MCNC: 242 UG/DL (ref 110–370)
VIT B12 SERPL-MCNC: 531 PG/ML (ref 211–911)
VLDL: 16 MG/DL (ref 0–40)
WBC # BLD AUTO: 5.9 X10*3/UL (ref 4.4–11.3)

## 2024-07-01 PROCEDURE — 83550 IRON BINDING TEST: CPT

## 2024-07-01 PROCEDURE — 85610 PROTHROMBIN TIME: CPT | Mod: QW | Performed by: INTERNAL MEDICINE

## 2024-07-01 PROCEDURE — 3075F SYST BP GE 130 - 139MM HG: CPT | Performed by: ANESTHESIOLOGY

## 2024-07-01 PROCEDURE — 80053 COMPREHEN METABOLIC PANEL: CPT

## 2024-07-01 PROCEDURE — 83540 ASSAY OF IRON: CPT

## 2024-07-01 PROCEDURE — 99211 OFF/OP EST MAY X REQ PHY/QHP: CPT

## 2024-07-01 PROCEDURE — 83690 ASSAY OF LIPASE: CPT

## 2024-07-01 PROCEDURE — 84443 ASSAY THYROID STIM HORMONE: CPT

## 2024-07-01 PROCEDURE — 82746 ASSAY OF FOLIC ACID SERUM: CPT

## 2024-07-01 PROCEDURE — 85025 COMPLETE CBC W/AUTO DIFF WBC: CPT

## 2024-07-01 PROCEDURE — 1036F TOBACCO NON-USER: CPT | Performed by: ANESTHESIOLOGY

## 2024-07-01 PROCEDURE — 82607 VITAMIN B-12: CPT

## 2024-07-01 PROCEDURE — 36415 COLL VENOUS BLD VENIPUNCTURE: CPT

## 2024-07-01 PROCEDURE — 99213 OFFICE O/P EST LOW 20 MIN: CPT | Performed by: ANESTHESIOLOGY

## 2024-07-01 PROCEDURE — 82728 ASSAY OF FERRITIN: CPT

## 2024-07-01 PROCEDURE — 3079F DIAST BP 80-89 MM HG: CPT | Performed by: ANESTHESIOLOGY

## 2024-07-01 PROCEDURE — 82150 ASSAY OF AMYLASE: CPT

## 2024-07-01 PROCEDURE — 3008F BODY MASS INDEX DOCD: CPT | Performed by: ANESTHESIOLOGY

## 2024-07-01 PROCEDURE — 80061 LIPID PANEL: CPT

## 2024-07-01 PROCEDURE — 82306 VITAMIN D 25 HYDROXY: CPT

## 2024-07-01 RX ORDER — OXYCODONE HYDROCHLORIDE 5 MG/1
7.5 TABLET ORAL 2 TIMES DAILY PRN
Qty: 30 TABLET | Refills: 0 | Status: SHIPPED | OUTPATIENT
Start: 2024-07-01 | End: 2024-07-11

## 2024-07-01 ASSESSMENT — ENCOUNTER SYMPTOMS
SHORTNESS OF BREATH: 0
FEVER: 0
BLOOD IN STOOL: 0

## 2024-07-01 ASSESSMENT — PAIN - FUNCTIONAL ASSESSMENT: PAIN_FUNCTIONAL_ASSESSMENT: 0-10

## 2024-07-01 ASSESSMENT — PAIN SCALES - GENERAL
PAINLEVEL: 1
PAINLEVEL_OUTOF10: 1

## 2024-07-01 ASSESSMENT — PAIN DESCRIPTION - DESCRIPTORS: DESCRIPTORS: BURNING;ACHING;RADIATING

## 2024-07-01 NOTE — PROGRESS NOTES
Chief Complain  Follow-up (From Hospital follow from [facility] from the 14th-17th, for acute liver injury from a  clot. Woke up on the 18th coughing up blood clots. Had a high INR. Was in the hospital from 18-23. Is on Lovenox bridge. Missed infusion, would like to discuss other options. Wants to discuss another medication )       History Of Present Illness  Angle Martins is a 64 y.o. female here for right leg pain . The patient rates the pain at 1  on a scale from 0-10.  The patient describes pain as aching, radiating, burning.  The pain is worsened by bending forward and walking and is alleviated by medications aspirin and prescribed pain medications.  Since the last visit the pain has stayed the same.  The patient denies any fever, chills, weight loss, bladder/bowel incontinence.         Past Medical History  She has a past medical history of COVID-19 and Personal history of other venous thrombosis and embolism.    Surgical History  She has a past surgical history that includes Other surgical history (07/08/2022); Other surgical history (07/08/2022); Other surgical history (07/08/2022); CT angio aorta and bilateral iliofemoral runoff w and or wo IV contrast (12/8/2022); and CT angio aorta and bilateral iliofemoral runoff w and or wo IV contrast (12/13/2022).     Social History  She reports that she quit smoking about 36 years ago. Her smoking use included cigarettes. She has never used smokeless tobacco. She reports current alcohol use. She reports that she does not use drugs.    Family History  No family history on file.     Allergies  Promethazine and Phenothiazines    Review of Systems  Review of Systems   Constitutional:  Negative for fever.   Respiratory:  Negative for shortness of breath.    Cardiovascular:  Negative for chest pain.   Gastrointestinal:  Negative for blood in stool.   Psychiatric/Behavioral:  Negative for suicidal ideas.         Physical Exam  Physical Exam  HENT:      Head:  "Normocephalic.   Eyes:      Extraocular Movements: Extraocular movements intact.      Pupils: Pupils are equal, round, and reactive to light.   Pulmonary:      Effort: Pulmonary effort is normal.   Neurological:      Mental Status: She is alert and oriented to person, place, and time.   Psychiatric:         Mood and Affect: Mood normal.           Last Recorded Vitals  Blood pressure 132/85, pulse 90, temperature 37.1 °C (98.8 °F), resp. rate 18, height 1.626 m (5' 4\"), weight 78.9 kg (174 lb), SpO2 98%.       Assessment/Plan     Angle Martins is a 64 y.o. female here for follow-up of chronic right lower extremity pain.  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  She continues to have pain in the right lower extremity since then.  She has been on chronic opiate therapy with Norco 10 mg twice daily with significant improvement in her pain.  However since the last visit she has been hospitalized twice once with elevated liver enzymes was considered to be due to liver infarction, subsequently for hemoptysis.  She has been recommended to avoid using acetaminophen.  10 mg of hydrocodone extended release has not been covered by her insurance, we then switched her to 7.5 oxycodone which is again not her insurance.  Would put her on 5 mg oxycodone 1.5 mg twice daily as needed to replace her Norco.  I have personally reviewed the OARRS report.  I have considered the risks of abuse, dependence, addiction and diversion.          Jose Armando Velazquez MD  "

## 2024-07-01 NOTE — PROGRESS NOTES
Patient identification verified with 2 identifiers.    Location: [facility] [place] - [address] [place] [address] [phone]     Referring Physician: Dr. Stephan Martins  Enrollment/ Re-enrollment date: 4/26/2025.    INR Goal: 2.0-3.0  INR monitoring is per [facility] protocol.  Anticoagulation Medication: warfarin  Indication: Arterial Embolism, DVT and PAD    Subjective   Bleeding signs/symptoms: No.  Pt denies.  Bruising: No.  Pt denies.  Major bleeding event: No  Thrombosis signs/symptoms: No  Thromboembolic event: Yes.  Pt was recently hospitalized with possible liver infarct due to blood clot and coughing up blood clots.  Pt reports that INR was >7 at [facility].  She says that this was untreated but her coumadin was held. She resumed coumadin at usual dose on 6/22/24.  Missed doses: No.  Pt denies.  Extra doses: No  Medication changes: Yes.  Pt had no new pain meds over the weekend.  Pt advised to avoid Norco and Tylenol.  Dietary changes: No.  Pt denies.  Change in health: Yes.  Pt discharged from [facility] on 6/23/24.  Change in activity: No  Alcohol: No  Other concerns: No    Upcoming Procedures:  Does the Patient Have any upcoming procedures that require interruption in anticoagulation therapy? no  Does the patient require bridging? yes.  Lovenox subcutaneous 80mg BID    Sent secure chat to Carol Meier and Kimber:  Hello there. I am seeing this patient in the coumadin clinic who was recently seen by both of you in the hospital. She was discharged on lovenox inj 80mg BID and resumed her warfarin 3mg/day approx 5 days ago. Her INR today is 1.2. She only has enough lovenox for one more day. Can one of you send in a refill to [address] [place] for her? Thanks so much   Response from Dr. Martins:   I can send over for 1 week. Goal is usually overlap of 1 day therapeutic INR unless she has increased bleeding/bruising. Will that tide her over?   I sent for 7 days max.  Please  remind the patient that once she is therapeutic, overlap 2 more doses, then stop.  Watch out for excessive bleeding.  Thanks, Magui!  Anticoagulation Summary  As of 2024      INR goal:  2.0-3.0   TTR:  58.8% (8.7 mo)   INR used for dosin.40 (2024)   Weekly warfarin total:  21 mg               Assessment/Plan   Subtherapeutic     1. New dose: no change.  Will maintain current dose but will give Pt a One time extra dose of 1.5mg on  and  and have Pt continue Lovenox inj 80mg BID.  21mg weekly.  2. Next INR:  2 days  Pt unable to RTC on Friday due to infusion appt.  3. Continue Lovenox subcutaneous Inj. BID  4. Pt getting lab work today, LFTs      Education provided to patient during the visit:  Patient instructed to call in interim with questions, concerns and changes.   Patient educated on interactions between medications and warfarin.   Patient educated on dietary consistency in vitamin k consumption.   Patient educated on affects of alcohol consumption while taking warfarin.   Patient educated on signs of bleeding/clotting.   Patient educated on compliance with dosing, follow up appointments, and prescribed plan of care.

## 2024-07-02 ENCOUNTER — APPOINTMENT (OUTPATIENT)
Dept: ORTHOPEDIC SURGERY | Facility: CLINIC | Age: 64
End: 2024-07-02
Payer: MEDICARE

## 2024-07-03 ENCOUNTER — ANTICOAGULATION - WARFARIN VISIT (OUTPATIENT)
Dept: CARDIOLOGY | Facility: CLINIC | Age: 64
End: 2024-07-03
Payer: MEDICARE

## 2024-07-03 ENCOUNTER — APPOINTMENT (OUTPATIENT)
Dept: CARDIOLOGY | Facility: CLINIC | Age: 64
End: 2024-07-03
Payer: MEDICARE

## 2024-07-03 DIAGNOSIS — I73.9 PAD (PERIPHERAL ARTERY DISEASE) (CMS-HCC): ICD-10-CM

## 2024-07-03 DIAGNOSIS — Z86.718 HISTORY OF DEEP VEIN THROMBOSIS: ICD-10-CM

## 2024-07-03 DIAGNOSIS — I74.9 ARTERIAL EMBOLISM (MULTI): Primary | ICD-10-CM

## 2024-07-03 LAB
POC INR: 2
POC PROTHROMBIN TIME: NORMAL

## 2024-07-03 PROCEDURE — 99211 OFF/OP EST MAY X REQ PHY/QHP: CPT

## 2024-07-03 PROCEDURE — 85610 PROTHROMBIN TIME: CPT | Mod: QW

## 2024-07-03 NOTE — PROGRESS NOTES
Patient identification verified with 2 identifiers.    Location: [facility] - [address] [place] [address] [phone]     Referring Physician: Dr. Stephan Martins  Enrollment/ Re-enrollment date: 2025.    INR Goal: 2.0-3.0  INR monitoring is per [facility] protocol.  Anticoagulation Medication: warfarin  Indication: Arterial Embolism, DVT and PAD    Subjective   Bleeding signs/symptoms: No.  Pt denies.  Bruising: No.  Pt denies.  Major bleeding event: No  Thrombosis signs/symptoms: No  Thromboembolic event:    Missed doses: No.  Pt denies.  Extra doses: Yes  Pt took one time dosing for past 2 days as instructed.  Medication changes: No.  Pt denies.  Dietary changes: No.  Pt denies.  Change in health: No.  Pt discharged from [facility] on 24 on coumadin and bridging with lovenox inj.  Change in activity: No  Alcohol: No  Other concerns: No    Upcoming Procedures:  Does the Patient Have any upcoming procedures that require interruption in anticoagulation therapy? no  Does the patient require bridging? yes.  Lovenox subcutaneous 80mg BID    Dose was maintained at last visit but one time dosing was given for 2 days.    Anticoagulation Summary  As of 7/3/2024      INR goal:  2.0-3.0   TTR:  58.3% (8.8 mo)   INR used for dosin.00 (7/3/2024)   Weekly warfarin total:  21 mg               Assessment/Plan   Therapeutic     1. New dose: no change.  Will maintain current dose but will give Pt a One time extra dose of 1.5mg on 7/3 and have Pt continue Lovenox inj 80mg BID.  21mg weekly.  2. Next INR:  2 days  If therapeutic at next visit, ok to discontinue lovenox inj. And recheck INR in 1 week.  3. Continue Lovenox subcutaneous Inj. BID        Education provided to patient during the visit:  Patient instructed to call in interim with questions, concerns and changes.   Patient educated on interactions between medications and warfarin.   Patient educated on dietary consistency in vitamin  k consumption.   Patient educated on affects of alcohol consumption while taking warfarin.   Patient educated on signs of bleeding/clotting.   Patient educated on compliance with dosing, follow up appointments, and prescribed plan of care.

## 2024-07-03 NOTE — DOCUMENTATION CLARIFICATION NOTE
PATIENT:               [facility] FRANKS  [id number]  MRN:                       [id number]  :                       1960  ADMIT DATE:       2024 1:18 PM  DISCH DATE:        2024 1:05 PM  RESPONDING PROVIDER #:        [id number]          PROVIDER RESPONSE TEXT:    Hemoptysis due to or enhanced by Warfarin    CDI QUERY TEXT:    Clarification    Instruction:    Based on your assessment of the patient and the clinical information, please provide the requested documentation by clicking on the appropriate radio button and enter any additional information if prompted.    Question: Please further clarify if a relationship exists between Hemoptysis and Warfarin    When answering this query, please exercise your independent professional judgment. The fact that a question is being asked, does not imply that any particular answer is desired or expected.    The patient's clinical indicators include:  Clinical Information: The patient is a 64 year old female presenting with hemoptysis i/s/o elevated INR.    Clinical Indicators: INR: : 1.5, : 1.3, : 1.0, : 0.9, : 0.9, hemoptysis, continued chest pain, RUQ pain and SOB on exertion    Treatment: Vascular Medicine consulted, Warfarin and ASA were held, and low intensity Heparin infusion was started, As hemoptysis decreased, patient was slowly trial on anticoagulation and was able to be transitioned from low intensity heparin to high intensity heparin to coumadin without reoccurrence of bleeding. Patient to complete remainder of bridge to warfarin with lovenox at home. Coumadin clinic follow up arranged. Pt will continue lovenox 80 mg BID for 5 days or until INR at goal (2-3) for 2 days in a row    Risk Factors: on Warfarin indefinitely  Options provided:  -- Hemoptysis due to or enhanced by Warfarin  -- Hemoptysis not related to Warfarin  -- Other - I will add my own diagnosis  -- Refer to Clinical Documentation  Reviewer    Query created by: Ronit Stubbs on 6/27/2024 1:24 PM      Electronically signed by:  YAHIR LEES MD 7/3/2024 1:14 PM

## 2024-07-05 ENCOUNTER — ANTICOAGULATION - WARFARIN VISIT (OUTPATIENT)
Dept: CARDIOLOGY | Facility: CLINIC | Age: 64
End: 2024-07-05
Payer: MEDICARE

## 2024-07-05 DIAGNOSIS — Z86.718 HISTORY OF DEEP VEIN THROMBOSIS: ICD-10-CM

## 2024-07-05 DIAGNOSIS — I74.9 ARTERIAL EMBOLISM (MULTI): Primary | ICD-10-CM

## 2024-07-05 DIAGNOSIS — I73.9 PAD (PERIPHERAL ARTERY DISEASE) (CMS-HCC): ICD-10-CM

## 2024-07-05 LAB
POC INR: 2.1 (ref 2–3)
POC PROTHROMBIN TIME: NORMAL

## 2024-07-05 PROCEDURE — 85610 PROTHROMBIN TIME: CPT | Mod: QW | Performed by: INTERNAL MEDICINE

## 2024-07-05 PROCEDURE — 99211 OFF/OP EST MAY X REQ PHY/QHP: CPT

## 2024-07-05 NOTE — PROGRESS NOTES
Patient identification verified with 2 identifiers.    Location: [facility] - [address] [place] [address] [phone]     Referring Physician: Dr. Stephan Martins  Enrollment/ Re-enrollment date: 2025.    INR Goal: 2.0-3.0  INR monitoring is per [facility] protocol.  Anticoagulation Medication: warfarin  Indication: Arterial Embolism, DVT and PAD    Subjective   Bleeding signs/symptoms: No.  Pt denies.    Bruising: Yes.  Pt reports bruising all over stomach from the Lovenox Inj.  Major bleeding event: No  Thrombosis signs/symptoms: No  Thromboembolic event:    Missed doses: No.  Pt denies.  Extra doses: Yes.  Pt took One time extra dose of 1.5mg on 7/3/24 as instructed.  Medication changes: No.  Pt denies.  Dietary changes: No.  Pt denies.  Change in health: No.  Pt discharged from [facility] on 24 on coumadin and bridging with lovenox inj.  Change in activity: No  Alcohol: No  Other concerns: No    Upcoming Procedures:  Does the Patient Have any upcoming procedures that require interruption in anticoagulation therapy? no  Does the patient require bridging? yes.  Lovenox subcutaneous 80mg BID    Dose maintained after last visit but a One time extra dose of 1.5mg was given on 7/3/24.  Lovenox continued, 80mg subcutaneous BID.  Pt is taking 21mg of warfarin weekly.  Anticoagulation Summary  As of 2024      INR goal:  2.0-3.0   TTR:  58.5% (8.8 mo)   INR used for dosin.10 (2024)   Weekly warfarin total:  21 mg               Assessment/Plan   Therapeutic     1. New dose: no change.  Will maintain current dose.  21mg weekly.  2. Next INR: 1 week.  Can r/s in 2 weeks if INR is Therapeutic next visit.   3. Discontinue Lovenox Subcutaneous Inj. BID        Education provided to patient during the visit:  Patient instructed to call in interim with questions, concerns and changes.   Patient educated on interactions between medications and warfarin.   Patient educated on  dietary consistency in vitamin k consumption.   Patient educated on affects of alcohol consumption while taking warfarin.   Patient educated on signs of bleeding/clotting.   Patient educated on compliance with dosing, follow up appointments, and prescribed plan of care.

## 2024-07-09 ENCOUNTER — PATIENT MESSAGE (OUTPATIENT)
Dept: PAIN MEDICINE | Facility: CLINIC | Age: 64
End: 2024-07-09
Payer: MEDICARE

## 2024-07-09 DIAGNOSIS — G89.4 CHRONIC PAIN SYNDROME: ICD-10-CM

## 2024-07-09 DIAGNOSIS — M54.16 LUMBAR RADICULAR PAIN: ICD-10-CM

## 2024-07-09 DIAGNOSIS — I73.9 PAD (PERIPHERAL ARTERY DISEASE) (CMS-HCC): ICD-10-CM

## 2024-07-10 ENCOUNTER — APPOINTMENT (OUTPATIENT)
Dept: CARDIOLOGY | Facility: CLINIC | Age: 64
End: 2024-07-10
Payer: MEDICARE

## 2024-07-10 ENCOUNTER — PATIENT OUTREACH (OUTPATIENT)
Dept: CARE COORDINATION | Facility: CLINIC | Age: 64
End: 2024-07-10
Payer: MEDICARE

## 2024-07-10 RX ORDER — OXYCODONE HYDROCHLORIDE 5 MG/1
7.5 TABLET ORAL 2 TIMES DAILY PRN
Qty: 90 TABLET | Refills: 0 | Status: SHIPPED | OUTPATIENT
Start: 2024-07-11 | End: 2024-08-10

## 2024-07-10 NOTE — PROGRESS NOTES
Unable to reach patient for call back after patient's follow up appointment with PCP.   ZUNILDAM with call back number for patient to call if needed   If no voicemail available call attempts x 2 were made to contact the patient to assist with any questions or concerns patient may have.

## 2024-07-12 ENCOUNTER — ANTICOAGULATION - WARFARIN VISIT (OUTPATIENT)
Dept: CARDIOLOGY | Facility: CLINIC | Age: 64
End: 2024-07-12
Payer: MEDICARE

## 2024-07-12 DIAGNOSIS — I73.9 PAD (PERIPHERAL ARTERY DISEASE) (CMS-HCC): ICD-10-CM

## 2024-07-12 DIAGNOSIS — Z86.718 HISTORY OF DEEP VEIN THROMBOSIS: ICD-10-CM

## 2024-07-12 DIAGNOSIS — I74.9 ARTERIAL EMBOLISM (MULTI): Primary | ICD-10-CM

## 2024-07-12 LAB
POC INR: 2.4
POC PROTHROMBIN TIME: NORMAL

## 2024-07-12 PROCEDURE — 85610 PROTHROMBIN TIME: CPT | Mod: QW

## 2024-07-12 PROCEDURE — 99211 OFF/OP EST MAY X REQ PHY/QHP: CPT

## 2024-07-12 NOTE — PROGRESS NOTES
Patient identification verified with 2 identifiers.    Location: [facility] [place] - [address] [place] [id number] [phone]     Referring Physician: Dr. Stephan Martins  Enrollment/ Re-enrollment date: 2025.    INR Goal: 2.0-3.0  INR monitoring is per AMS protocol.  Anticoagulation Medication: warfarin  Indication: Arterial Embolism, DVT and PAD    Subjective   Bleeding signs/symptoms: No    Bruising: No   Major bleeding event: No  Thrombosis signs/symptoms: No  Thromboembolic event: No  Missed doses: No  Extra doses: No  Medication changes: No  Dietary changes: No  Change in health: No  Change in activity: No  Alcohol: No  Other concerns: No    Upcoming Procedures:  Does the Patient Have any upcoming procedures that require interruption in anticoagulation therapy? no  Does the patient require bridging? no      Anticoagulation Summary  As of 2024      INR goal:  2.0-3.0   TTR:  59.5% (9.1 mo)   INR used for dosin.40 (2024)   Weekly warfarin total:  21 mg               Assessment/Plan   Therapeutic     1. New dose: no change    2. Next INR: 2 weeks      Education provided to patient during the visit:  Patient instructed to call in interim with questions, concerns and changes.   Patient educated on dietary consistency in vitamin k consumption.

## 2024-07-23 ENCOUNTER — PATIENT OUTREACH (OUTPATIENT)
Dept: CARE COORDINATION | Facility: CLINIC | Age: 64
End: 2024-07-23
Payer: MEDICARE

## 2024-07-26 ENCOUNTER — APPOINTMENT (OUTPATIENT)
Dept: CARDIOLOGY | Facility: CLINIC | Age: 64
End: 2024-07-26
Payer: MEDICARE

## 2024-07-26 ENCOUNTER — ANTICOAGULATION - WARFARIN VISIT (OUTPATIENT)
Dept: CARDIOLOGY | Facility: CLINIC | Age: 64
End: 2024-07-26
Payer: MEDICARE

## 2024-07-26 DIAGNOSIS — I73.9 PAD (PERIPHERAL ARTERY DISEASE) (CMS-HCC): ICD-10-CM

## 2024-07-26 DIAGNOSIS — Z86.718 HISTORY OF DEEP VEIN THROMBOSIS: ICD-10-CM

## 2024-07-26 DIAGNOSIS — I74.9 ARTERIAL EMBOLISM (MULTI): ICD-10-CM

## 2024-07-29 NOTE — PROGRESS NOTES
I attempted to call the patient twice to discuss plan of care.  Instructed patient to call [facility] and provided office phone number.  Thank you,  Ascencion Huertas CNP

## 2024-07-30 ENCOUNTER — ANTICOAGULATION - WARFARIN VISIT (OUTPATIENT)
Dept: CARDIOLOGY | Facility: CLINIC | Age: 64
End: 2024-07-30
Payer: MEDICARE

## 2024-07-30 DIAGNOSIS — Z86.718 HISTORY OF DEEP VEIN THROMBOSIS: ICD-10-CM

## 2024-07-30 DIAGNOSIS — I74.9 ARTERIAL EMBOLISM (MULTI): Primary | ICD-10-CM

## 2024-07-30 DIAGNOSIS — I73.9 PAD (PERIPHERAL ARTERY DISEASE) (CMS-HCC): ICD-10-CM

## 2024-07-30 LAB
POC INR: 1.6 (ref 2–3)
POC PROTHROMBIN TIME: ABNORMAL

## 2024-07-30 PROCEDURE — 85610 PROTHROMBIN TIME: CPT | Mod: QW | Performed by: INTERNAL MEDICINE

## 2024-07-30 PROCEDURE — 99211 OFF/OP EST MAY X REQ PHY/QHP: CPT

## 2024-07-30 NOTE — PROGRESS NOTES
Patient identification verified with 2 identifiers.    Location: [facility] - [address] [place] [address] [phone]     Referring Physician: Dr. Stephan Martins  Enrollment/ Re-enrollment date: 2025.    INR Goal: 2.0-3.0  INR monitoring is per [facility] protocol.  Anticoagulation Medication: warfarin  Indication: Arterial Embolism, DVT and PAD    Subjective   Bleeding signs/symptoms: No.  Pt denies.    Bruising: No.  Pt denies.   Major bleeding event: No  Thrombosis signs/symptoms: No  Thromboembolic event: No  Missed doses: No.  Pt denies.  Extra doses: No  Medication changes: No.  Pt denies.  Dietary changes: No.  Pt denies.  Change in health: No.  Pt denies.  Change in activity: No  Alcohol: No  Other concerns: No    Upcoming Procedures:  Does the Patient Have any upcoming procedures that require interruption in anticoagulation therapy? no  Does the patient require bridging? no    Dose maintained after last visit.  Pt is taking 21mg of warfarin weekly.  Anticoagulation Summary  As of 2024      INR goal:  2.0-3.0   TTR:  59.0% (9.7 mo)   INR used for dosin.60 (2024)   Weekly warfarin total:  21 mg               Assessment/Plan   Subtherapeutic     1. New dose: no change.  Maintain current dose but will give Pt a One time extra dose of 1.5mg on  & 8/1.  21mg weekly.   2. Next INR: 1 week.  Can r/s in 2 weeks if therapeutic next visit.      Education provided to patient during the visit:  Patient instructed to call in interim with questions, concerns and changes.   Patient educated on interactions between medications and warfarin.   Patient educated on dietary consistency in vitamin k consumption.   Patient educated on affects of alcohol consumption while taking warfarin.   Patient educated on signs of bleeding/clotting.   Patient educated on compliance with dosing, follow up appointments, and prescribed plan of care.

## 2024-08-01 RX ORDER — EPINEPHRINE 0.3 MG/.3ML
0.3 INJECTION SUBCUTANEOUS EVERY 5 MIN PRN
OUTPATIENT
Start: 2024-08-08

## 2024-08-01 RX ORDER — FAMOTIDINE 10 MG/ML
20 INJECTION INTRAVENOUS ONCE AS NEEDED
OUTPATIENT
Start: 2024-08-08

## 2024-08-01 RX ORDER — DIPHENHYDRAMINE HYDROCHLORIDE 50 MG/ML
50 INJECTION INTRAMUSCULAR; INTRAVENOUS AS NEEDED
OUTPATIENT
Start: 2024-08-08

## 2024-08-01 RX ORDER — KETAMINE HCL IN NACL, ISO-OSM 100MG/10ML
SYRINGE (ML) INJECTION ONCE
OUTPATIENT
Start: 2024-08-08

## 2024-08-01 RX ORDER — ALBUTEROL SULFATE 0.83 MG/ML
3 SOLUTION RESPIRATORY (INHALATION) AS NEEDED
OUTPATIENT
Start: 2024-08-08

## 2024-08-01 RX ORDER — NITROGLYCERIN 0.4 MG/1
0.4 TABLET SUBLINGUAL ONCE
OUTPATIENT
Start: 2024-08-08 | End: 2024-08-08

## 2024-08-01 RX ORDER — ONDANSETRON HYDROCHLORIDE 2 MG/ML
4 INJECTION, SOLUTION INTRAVENOUS ONCE
OUTPATIENT
Start: 2024-08-08 | End: 2024-08-08

## 2024-08-01 RX ORDER — KETOROLAC TROMETHAMINE 30 MG/ML
30 INJECTION, SOLUTION INTRAMUSCULAR; INTRAVENOUS ONCE
OUTPATIENT
Start: 2024-08-08 | End: 2024-08-08

## 2024-08-05 ENCOUNTER — ANTICOAGULATION - WARFARIN VISIT (OUTPATIENT)
Dept: CARDIOLOGY | Facility: CLINIC | Age: 64
End: 2024-08-05
Payer: MEDICARE

## 2024-08-05 DIAGNOSIS — I74.9 ARTERIAL EMBOLISM (MULTI): Primary | ICD-10-CM

## 2024-08-05 DIAGNOSIS — I73.9 PAD (PERIPHERAL ARTERY DISEASE) (CMS-HCC): ICD-10-CM

## 2024-08-05 DIAGNOSIS — Z86.718 HISTORY OF DEEP VEIN THROMBOSIS: ICD-10-CM

## 2024-08-05 LAB
POC INR: 2.2 (ref 2–3)
POC PROTHROMBIN TIME: NORMAL

## 2024-08-05 PROCEDURE — 85610 PROTHROMBIN TIME: CPT | Mod: QW | Performed by: INTERNAL MEDICINE

## 2024-08-05 PROCEDURE — 99211 OFF/OP EST MAY X REQ PHY/QHP: CPT

## 2024-08-05 NOTE — PROGRESS NOTES
Patient identification verified with 2 identifiers.    Location: [facility] - [address] [place] [address] [phone]     Referring Physician: Dr. Stephan Martins  Enrollment/ Re-enrollment date: 2025.    INR Goal: 2.0-3.0  INR monitoring is per AMS protocol.  Anticoagulation Medication: warfarin  Indication: Arterial Embolism, DVT and PAD    Subjective   Bleeding signs/symptoms: No.  Pt denies.    Bruising: No.  Pt denies.   Major bleeding event: No  Thrombosis signs/symptoms: No  Thromboembolic event: No  Missed doses: No.  Pt denies.  Extra doses: Yes.  Pt took a One time extra dose of 1.5mg on  and  as instructed.  Medication changes: No.  Pt denies.  Dietary changes: No.  Pt denies.  Change in health: Yes.  Pt states that she has been having a lot pain in her leg, mainly burning sharp pain.  She missed her Ketamine infusion in  and July.  Change in activity: No  Alcohol: No  Other concerns: No    Upcoming Procedures:  Does the Patient Have any upcoming procedures that require interruption in anticoagulation therapy? no  Does the patient require bridging? no    Dose maintained after last visit but a One time extra dose of 1.5mg was given on  and .  Pt is taking 21mg of warfarin weekly.  Anticoagulation Summary  As of 2024      INR goal:  2.0-3.0   TTR:  58.4% (9.9 mo)   INR used for dosin.20 (2024)   Weekly warfarin total:  22.5 mg               Assessment/Plan   Therapeutic     1. New dose:  Will Increase weekly dosage by approx. 7% .  21mg of warfarin weekly Increased to 22.5mg weekly.  Pt was given 3mg of extra warfarin this past week to get her therapeutic so I will incorporate half of that into her weekly dosing.  2. Next INR: 1 week.  Can r/s in 2 weeks if therapeutic next visit.  3. Pt is getting Ketamine infusion this week for leg pain and will be back on schedule to have these done every month.      Education provided to patient  during the visit:  Patient instructed to call in interim with questions, concerns and changes.   Patient educated on interactions between medications and warfarin.   Patient educated on dietary consistency in vitamin k consumption.   Patient educated on affects of alcohol consumption while taking warfarin.   Patient educated on signs of bleeding/clotting.   Patient educated on compliance with dosing, follow up appointments, and prescribed plan of care.

## 2024-08-08 ENCOUNTER — APPOINTMENT (OUTPATIENT)
Dept: INFUSION THERAPY | Facility: CLINIC | Age: 64
End: 2024-08-08
Payer: MEDICARE

## 2024-08-08 DIAGNOSIS — U09.9 POST-COVID CHRONIC COUGH: ICD-10-CM

## 2024-08-08 DIAGNOSIS — R29.818 NEUROGENIC CLAUDICATION: ICD-10-CM

## 2024-08-08 DIAGNOSIS — R05.3 POST-COVID CHRONIC COUGH: ICD-10-CM

## 2024-08-08 DIAGNOSIS — M54.16 LUMBAR RADICULAR PAIN: Primary | ICD-10-CM

## 2024-08-09 ENCOUNTER — APPOINTMENT (OUTPATIENT)
Dept: PRIMARY CARE | Facility: CLINIC | Age: 64
End: 2024-08-09
Payer: MEDICARE

## 2024-08-09 VITALS
WEIGHT: 171.2 LBS | HEART RATE: 83 BPM | DIASTOLIC BLOOD PRESSURE: 86 MMHG | OXYGEN SATURATION: 98 % | SYSTOLIC BLOOD PRESSURE: 120 MMHG | HEIGHT: 64 IN | BODY MASS INDEX: 29.23 KG/M2

## 2024-08-09 DIAGNOSIS — F51.04 CHRONIC INSOMNIA: ICD-10-CM

## 2024-08-09 DIAGNOSIS — E78.2 MIXED HYPERLIPIDEMIA: ICD-10-CM

## 2024-08-09 DIAGNOSIS — G89.4 CHRONIC PAIN SYNDROME: ICD-10-CM

## 2024-08-09 DIAGNOSIS — I82.411 ACUTE EMBOLISM AND THROMBOSIS OF RIGHT FEMORAL VEIN (MULTI): ICD-10-CM

## 2024-08-09 DIAGNOSIS — K72.00 ACUTE LIVER FAILURE WITHOUT HEPATIC COMA (HHS-HCC): ICD-10-CM

## 2024-08-09 DIAGNOSIS — I73.9 PAD (PERIPHERAL ARTERY DISEASE) (CMS-HCC): ICD-10-CM

## 2024-08-09 DIAGNOSIS — D52.9 ANEMIA DUE TO FOLIC ACID DEFICIENCY, UNSPECIFIED DEFICIENCY TYPE: ICD-10-CM

## 2024-08-09 DIAGNOSIS — M54.16 LUMBAR RADICULAR PAIN: ICD-10-CM

## 2024-08-09 DIAGNOSIS — E55.9 VITAMIN D DEFICIENCY: ICD-10-CM

## 2024-08-09 DIAGNOSIS — F33.1 MAJOR DEPRESSIVE DISORDER, RECURRENT EPISODE, MODERATE DEGREE (MULTI): ICD-10-CM

## 2024-08-09 DIAGNOSIS — K85.00 IDIOPATHIC ACUTE PANCREATITIS WITHOUT INFECTION OR NECROSIS (HHS-HCC): Primary | ICD-10-CM

## 2024-08-09 DIAGNOSIS — I10 ESSENTIAL HYPERTENSION: ICD-10-CM

## 2024-08-09 DIAGNOSIS — M54.31 SCIATICA OF RIGHT SIDE: ICD-10-CM

## 2024-08-09 DIAGNOSIS — E53.8 VITAMIN B12 DEFICIENCY: ICD-10-CM

## 2024-08-09 PROBLEM — E66.01 MORBID OBESITY (MULTI): Status: RESOLVED | Noted: 2018-10-24 | Resolved: 2024-08-09

## 2024-08-09 PROCEDURE — 3074F SYST BP LT 130 MM HG: CPT | Performed by: INTERNAL MEDICINE

## 2024-08-09 PROCEDURE — 3008F BODY MASS INDEX DOCD: CPT | Performed by: INTERNAL MEDICINE

## 2024-08-09 PROCEDURE — 99214 OFFICE O/P EST MOD 30 MIN: CPT | Performed by: INTERNAL MEDICINE

## 2024-08-09 PROCEDURE — 3079F DIAST BP 80-89 MM HG: CPT | Performed by: INTERNAL MEDICINE

## 2024-08-09 RX ORDER — ASPIRIN 325 MG
TABLET, DELAYED RELEASE (ENTERIC COATED) ORAL
Qty: 13 CAPSULE | Refills: 1 | Status: SHIPPED | OUTPATIENT
Start: 2024-08-09

## 2024-08-09 RX ORDER — DILTIAZEM HYDROCHLORIDE 120 MG/1
120 CAPSULE, EXTENDED RELEASE ORAL DAILY
Qty: 90 CAPSULE | Refills: 1 | Status: SHIPPED | OUTPATIENT
Start: 2024-08-09 | End: 2025-02-05

## 2024-08-09 RX ORDER — PNV NO.95/FERROUS FUM/FOLIC AC 28MG-0.8MG
TABLET ORAL
Qty: 90 TABLET | Refills: 3 | Status: SHIPPED | OUTPATIENT
Start: 2024-08-09

## 2024-08-09 RX ORDER — PANCRELIPASE 60000; 12000; 38000 [USP'U]/1; [USP'U]/1; [USP'U]/1
CAPSULE, DELAYED RELEASE PELLETS ORAL
Qty: 250 CAPSULE | Refills: 1 | Status: SHIPPED | OUTPATIENT
Start: 2024-08-09

## 2024-08-09 RX ORDER — ACETAMINOPHEN 500 MG
TABLET ORAL
Qty: 90 TABLET | Refills: 0 | Status: SHIPPED | OUTPATIENT
Start: 2024-08-09

## 2024-08-09 RX ORDER — WARFARIN 3 MG/1
3 TABLET ORAL NIGHTLY
Qty: 90 TABLET | Refills: 0 | Status: SHIPPED | OUTPATIENT
Start: 2024-08-09

## 2024-08-09 RX ORDER — TRAZODONE HYDROCHLORIDE 50 MG/1
25 TABLET ORAL NIGHTLY PRN
Status: CANCELLED | OUTPATIENT
Start: 2024-08-09

## 2024-08-09 RX ORDER — GABAPENTIN 600 MG/1
600 TABLET ORAL 3 TIMES DAILY
Qty: 90 TABLET | Refills: 3 | Status: SHIPPED | OUTPATIENT
Start: 2024-08-09 | End: 2024-12-07

## 2024-08-09 RX ORDER — OXYCODONE HYDROCHLORIDE 5 MG/1
7.5 TABLET ORAL 2 TIMES DAILY PRN
Qty: 90 TABLET | Refills: 0 | Status: SHIPPED | OUTPATIENT
Start: 2024-08-11 | End: 2024-09-10

## 2024-08-09 NOTE — PATIENT INSTRUCTIONS
Thank you very much for coming.  I am very happy to see you.    Initially, we were going to do your Medicare Wellness evaluation for the year, but with other pressing issues, let us first address your symptoms.    With your lingering loss of appetite, and lingering nausea, it seems that you have irritation or inflammation of your PANCREAS or PANCREATITIS.  Please avoid fat and dairy.  Please enjoy small frequent meals.  Please drink lots of fluids throughout the day.  You will benefit from CREON 38,000 units, take 2 capsules before meals 3 times a day, and take 1 capsule as needed up to 2 times a day for snacks.    When you come back in 6 weeks, please do some NONFASTING laboratory examinations a few days prior, so that we can reevaluate how you are doing.    Otherwise, the CT scan of your belly done in June did not reveal any source of your irritation.  We will continue to follow you clinically with a careful diet, and with Creon, as above.    Please continue using your ONDANSETRON as needed for NAUSEA.  Please let me know if your symptoms worsen.    In the future, you will benefit from VACCINATIONS, but for now, let us first concentrate on getting you to feel better sooner.    You will also be due for a MAMMOGRAM sometime soon.  I will remind you to get it done.    Please come back in 6 weeks.  That will be time for us to see how you are doing with CREON.  Please get in touch with your CHRONIC PAIN specialist to help alleviate your pain.  I am sorry that I cannot help any further on this matter.    Please continue to take care of yourself and each other, and please continue to pray for our recovery from this pandemic.  See you in 6 weeks.            0  Return in 6 weeks.  40 minutes please.  Repeat FASTING laboratory examinations, then see me soon after.  Medicare Wellness evaluation.  Coordinate with chronic pain, other specialists as the patient is seen.  Reassess history of PANCREATITIS, as well as LDL  cholesterol approaching 190.

## 2024-08-09 NOTE — PROGRESS NOTES
Subjective   Patient ID: Angle Martins is a 64 y.o. female who presents for Medicare Annual Wellness Visit Subsequent (Annual Medicare Wellness.).    HPI   Initially here for Medicare Wellness for the year, but with other pressing issues, this will be postponed in favor of symptomatic care.    Lingering anorexia, nausea.  In the meantime, she has had to use ONDANSETRON as needed.  She has lost some modest weight on top of previous weight loss.  She has been trying to cope by avoiding fat and dairy.  In the meantime, recent workup included a CT SCAN of the abdomen and pelvis revealing absent gallbladder, no apparent findings regarding GI tract, liver, pancreas.  Blood examination in the meantime showing elevated LIPASE levels.  Otherwise, no new skin changes.    Tolerating medications, with minimal dyspepsia perhaps, but otherwise, no gum or nose bleeding.  No apparent easy bruisability.  No apparent blood in urine or stool.  Likewise, no palpitations, dizziness, diaphoresis, or any other symptoms that might be referable to hypovolemia.    With debility, continues to try to manage with chronic pain without succumbing to depression.  Continues to want to improve quality of life, and does not wish harm to self or others.  Ambulating with the use of a single-point walking stick.  No recent falls.  No dyan substernal chest pain, no orthopnea, no paroxysmal nocturnal dyspnea.  No particular cough, no particular sputum production.  Again, continues to want to improve quality of life, and does not wish harm to self or others.  CONSTITUTIONALLY, no fever, no chills.  No night sweats.  No lingering anorexia or nausea.  No apparent lymphadenopathy.  No apparent weight loss.        Review of Systems  Review of systems as in history of present illness, and otherwise, reviewed separately as well, and was unremarkable/negative/noncontributory.        Objective   /86 (BP Location: Left arm, Patient Position: Sitting, BP  "Cuff Size: Adult)   Pulse 83   Ht 1.626 m (5' 4\")   Wt 77.7 kg (171 lb 3.2 oz)   SpO2 98%   BMI 29.39 kg/m²     Physical Exam  In good and hopeful spirits.  Not in distress or diaphoresis.  Minimally anxious perhaps.  Otherwise, receptive, oriented x 3.  Amiable.  Not unkempt.  Moderately built.  Remaining capable and independent.  Eager to improve quality of life, and not wishing harm to self or others.  Appropriate.    HEAD pink palpebral conjunctivae, anicteric sclerae.  Mucous membranes moist.  NECK supple, no apparent jugular venous distention.  CARDIOVASCULAR not in distress or diaphoresis.  No bipedal edema.  LUNGS not in distress or diaphoresis.  Not using accessory muscles.  ABDOMEN soft, with no direct, no rebound tenderness.  BACK no costovertebral angle tenderness.  EXTREMITIES no clubbing, no cyanosis.  SKIN with no breakdown, bleeding, jaundice.  NEURO no facial asymmetry.  No apparent cranial nerve deficits.  Romberg negative.  Ambulating without need of assistance.  No apparent focal weakness.  No tremors.  PSYCH receptive, appropriate, and eager to maintain and improve quality of life.        LABORATORY results reviewed, vitamin D adequate, folic acid adequate, LIPASE elevated, with mild transaminasemia noted.  Mixed hypercholesterolemia history, with LDL cholesterol 182.  ASCVD risk computed at 12%.  BMI 29.39, no longer obese.    CT scan of abdomen and pelvis done June 2024 negative, as above.        Assessment/Plan   Diagnoses and all orders for this visit:  Idiopathic acute pancreatitis without infection or necrosis ([facility]-HCC)  -     pancrelipase, Lip-Prot-Amyl, (Creon) 12,000-38,000 -60,000 unit capsule; Please take 2 capsules by mouth 3 times a day with meals, and please take 1 capsule twice a day for snacks.  Thank you.  -     Comprehensive Metabolic Panel; Future  -     Amylase; Future  -     Lipase; Future  -     Lipid Panel; Future  -     Follow Up In Primary Care - Established; " Future  Acute liver failure without hepatic coma ([facility]-HCC)  -     Follow Up In Primary Care - Established  -     Comprehensive Metabolic Panel; Future  -     Urinalysis with Reflex Culture and Microscopic; Future  -     CBC and Auto Differential; Future  -     Lipid Panel; Future  -     Follow Up In Primary Care - Established; Future  PAD (peripheral artery disease) (CMS-HCC)  -     Comprehensive Metabolic Panel; Future  -     Follow Up In Primary Care - Established; Future  Acute embolism and thrombosis of right femoral vein (Multi)  -     warfarin (Coumadin) 3 mg tablet; Take 1 tablet (3 mg) by mouth once daily at bedtime. Take as directed per After Visit Summary.  -     CBC and Auto Differential; Future  -     Follow Up In Primary Care - Established; Future  Major depressive disorder, recurrent episode, moderate degree (Multi)  -     Comprehensive Metabolic Panel; Future  -     Follow Up In Primary Care - Established; Future  Anemia due to folic acid deficiency, unspecified deficiency type  -     CBC and Auto Differential; Future  -     Follow Up In Primary Care - Established; Future  Vitamin D deficiency  -     cholecalciferol (Vitamin D-3) 50,000 unit capsule; TAKE 1 CAPSULE Weekly SUNDAYS PLEASE with food and full glass water. Thank you  -     Comprehensive Metabolic Panel; Future  -     Follow Up In Primary Care - Established; Future  Essential hypertension  -     dilTIAZem XR (Dilacor XR) 120 mg 24 hr capsule; Take 1 capsule (120 mg) by mouth once daily. Take 1 capsule once daily.  -     Comprehensive Metabolic Panel; Future  -     Urinalysis with Reflex Culture and Microscopic; Future  -     CBC and Auto Differential; Future  -     Follow Up In Primary Care - Established; Future  Mixed hyperlipidemia  -     Comprehensive Metabolic Panel; Future  -     Lipid Panel; Future  -     Follow Up In Primary Care - Established; Future  Vitamin B12 deficiency  -     cyanocobalamin (Vitamin B-12) 100 mcg tablet; Please  take 1 tablet by mouth with LUNCH every day.  Thank you.  -     CBC and Auto Differential; Future  -     Follow Up In Primary Care - Established; Future  Chronic insomnia  -     melatonin 5 mg tablet; Please take 1 tablet by mouth after supper every evening.  Watch out for daytime sleepiness.  No driving if drowsy.  Thank you.  -     Comprehensive Metabolic Panel; Future  -     Follow Up In Primary Care - Established; Future       Thank you very much for coming.  I am very happy to see you.    Initially, we were going to do your Medicare Wellness evaluation for the year, but with other pressing issues, let us first address your symptoms.    With your lingering loss of appetite, and lingering nausea, it seems that you have irritation or inflammation of your PANCREAS or PANCREATITIS.  Please avoid fat and dairy.  Please enjoy small frequent meals.  Please drink lots of fluids throughout the day.  You will benefit from CREON 38,000 units, take 2 capsules before meals 3 times a day, and take 1 capsule as needed up to 2 times a day for snacks.    When you come back in 6 weeks, please do some NONFASTING laboratory examinations a few days prior, so that we can reevaluate how you are doing.    Otherwise, the CT scan of your belly done in June did not reveal any source of your irritation.  We will continue to follow you clinically with a careful diet, and with Creon, as above.    Please continue using your ONDANSETRON as needed for NAUSEA.  Please let me know if your symptoms worsen.    In the future, you will benefit from VACCINATIONS, but for now, let us first concentrate on getting you to feel better sooner.    You will also be due for a MAMMOGRAM sometime soon.  I will remind you to get it done.    Please come back in 6 weeks.  That will be time for us to see how you are doing with CREON.  Please get in touch with your CHRONIC PAIN specialist to help alleviate your pain.  I am sorry that I cannot help any further on this  matter.    Please continue to take care of yourself and each other, and please continue to pray for our recovery from this pandemic.  See you in 6 weeks.            0  Return in 6 weeks.  40 minutes please.  Repeat FASTING laboratory examinations, then see me soon after.  Medicare Wellness evaluation.  Coordinate with chronic pain, other specialists as the patient is seen.  Reassess history of PANCREATITIS, as well as LDL cholesterol approaching 190.

## 2024-08-14 ENCOUNTER — ANTICOAGULATION - WARFARIN VISIT (OUTPATIENT)
Dept: CARDIOLOGY | Facility: CLINIC | Age: 64
End: 2024-08-14
Payer: MEDICARE

## 2024-08-14 ENCOUNTER — OFFICE VISIT (OUTPATIENT)
Dept: CARDIOLOGY | Facility: CLINIC | Age: 64
End: 2024-08-14
Payer: MEDICARE

## 2024-08-14 VITALS
SYSTOLIC BLOOD PRESSURE: 129 MMHG | WEIGHT: 171.2 LBS | HEART RATE: 82 BPM | RESPIRATION RATE: 18 BRPM | BODY MASS INDEX: 29.23 KG/M2 | HEIGHT: 64 IN | DIASTOLIC BLOOD PRESSURE: 87 MMHG

## 2024-08-14 DIAGNOSIS — I73.9 PAD (PERIPHERAL ARTERY DISEASE) (CMS-HCC): ICD-10-CM

## 2024-08-14 DIAGNOSIS — I74.9 ARTERIAL EMBOLISM (MULTI): Primary | ICD-10-CM

## 2024-08-14 DIAGNOSIS — E78.41 ELEVATED LIPOPROTEIN(A): ICD-10-CM

## 2024-08-14 DIAGNOSIS — Z86.718 HISTORY OF DEEP VEIN THROMBOSIS: ICD-10-CM

## 2024-08-14 DIAGNOSIS — G89.4 CHRONIC PAIN SYNDROME: Primary | ICD-10-CM

## 2024-08-14 DIAGNOSIS — E78.00 PURE HYPERCHOLESTEROLEMIA: ICD-10-CM

## 2024-08-14 LAB
POC INR: 1.9
POC PROTHROMBIN TIME: NORMAL

## 2024-08-14 PROCEDURE — 99215 OFFICE O/P EST HI 40 MIN: CPT | Performed by: INTERNAL MEDICINE

## 2024-08-14 PROCEDURE — 3008F BODY MASS INDEX DOCD: CPT | Performed by: INTERNAL MEDICINE

## 2024-08-14 PROCEDURE — 3074F SYST BP LT 130 MM HG: CPT | Performed by: INTERNAL MEDICINE

## 2024-08-14 PROCEDURE — 3079F DIAST BP 80-89 MM HG: CPT | Performed by: INTERNAL MEDICINE

## 2024-08-14 PROCEDURE — 85610 PROTHROMBIN TIME: CPT | Mod: QW

## 2024-08-14 PROCEDURE — 1036F TOBACCO NON-USER: CPT | Performed by: INTERNAL MEDICINE

## 2024-08-14 ASSESSMENT — PATIENT HEALTH QUESTIONNAIRE - PHQ9
2. FEELING DOWN, DEPRESSED OR HOPELESS: NOT AT ALL
SUM OF ALL RESPONSES TO PHQ9 QUESTIONS 1 AND 2: 0
1. LITTLE INTEREST OR PLEASURE IN DOING THINGS: NOT AT ALL

## 2024-08-14 ASSESSMENT — COLUMBIA-SUICIDE SEVERITY RATING SCALE - C-SSRS
1. IN THE PAST MONTH, HAVE YOU WISHED YOU WERE DEAD OR WISHED YOU COULD GO TO SLEEP AND NOT WAKE UP?: NO
6. HAVE YOU EVER DONE ANYTHING, STARTED TO DO ANYTHING, OR PREPARED TO DO ANYTHING TO END YOUR LIFE?: NO
2. HAVE YOU ACTUALLY HAD ANY THOUGHTS OF KILLING YOURSELF?: NO

## 2024-08-14 ASSESSMENT — PAIN SCALES - GENERAL: PAINLEVEL: 2

## 2024-08-14 NOTE — PATIENT INSTRUCTIONS
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 Continue the warfarin now. Local INR monitoring. Use the med-alert.    Off the statin bc of the recent liver issues - ?PCKS-9i as an alternative.   Lp(a) due fasting. Given the premature ASO - check coronary calcium scoring. Will arrange for an appt with Dr. Romero given the significant hyperlipidemia - not on statin..     Follow up 6 months.

## 2024-08-14 NOTE — PROGRESS NOTES
"OUTPATIENT FOLLOW-UP -  VASCULAR MEDICINE    DOS: 8/14/2024  Last seen:  2/14/2024      REQUESTING PHYSICIAN:  Dr. 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 INR followed by the PCP.     In June admitted to [facility] with CP - found to have elevate LET. Unclear etiology. Reportedly testing was normal. C/f clots in the liver by report - unfounded. DC with INR 6.2 - c/b hemoptysis - this was ultimately self limited. Resumed warfarin at [facility].     Last visit with Dr. Cash - told things looked OK. MITZY - mild disease (0.9).  CONCLUSIONS:  Right Lower Arterial: There is >50% stenosis documented at the popliteal artery. Known greater than 50% stenosis of the popliteal artery distal visualized on 11/13/2023. Anterior tibial artery was visualized in segments due to edema.  Stent: Lower Extremity Arterial - the stent located in the superficial femoral artery distal right is patent without stenosis.    Reports right calf pain \"if she overdoes it\". Reports could walk a mile without sx. Can be continuously walking in the pool for about an hour before cramping up.     Still doing the ketamine infusion monthly - thinks this helps with the nerve injury. Missed the June infusion and had more sx.     From prev notes:  hospital admission with ALI. [place] [facility] with acute on chronic limb ischemia. Underwent thrombectomy via thigh and leg incisions with angioplasty and stenting of the right SFA and AK pop and TP trunk angioplasty. Also had 4 compartment fasciotomy at that time. From eval - reports h/o HCY elevation, +elevated lipoprotein (A) x 1 (other check was normal); APLA - normal; VERA negative. ECHO - no PFO - fairly normal.      By her report the RLE foot changes with paraesthesia, numbness, mottling and IC dated to 5/2022. Was being " "evaluated for LCS as an etiology until duplex revealed arterial occlusion and was admitted for eval and procedures as noted.   Has a h/o RUE thromboembolism approx 15 years ago [2004] - s/p embolectomy in [place] ([place]). [phone] was also evaluated for ?TIA vs complex migraine - this was facial droop and some arm drift. +COVID x2 in  - also with c/f facial droop recurring in . Had loop recorder in the past - denies knowledge of arrhythmia. Reports prev work up for thromboembolic disorders in [place] but records not available.       - no pregnancy loss. One pregnancy with pre-eclampsia and IUGR. Denies a personal h/o VTE.        REVIEW OF SYSTEMS:     weight is decreased 19#  No sores, ulcers, rashes, skin lesions  + color changes hands and foot in the winter  No CP, chest pressure  No cough, SOB  No BRBPR, melena, hematuria  No bleeding  No edema, no calf pain    PHYSICAL EXAMINATION:   /87 (BP Location: Right arm, Patient Position: Sitting)   Pulse 82   Resp 18   Ht 1.626 m (5' 4\")   Wt 77.7 kg (171 lb 3.2 oz)   BMI 29.39 kg/m²     Gen: Appears well, NAD  Chest: CTA  CVS: regular without murmur or gallop  Ext: no edema, nontender  Skin: good condition without wounds or lesions  Pulses: DP / PT - multiphasic  Mood and affect appropriate    ADDITIONAL DATA:   Single Tubigrip worn on BLE.  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 Continue the warfarin now. Local INR monitoring. Use the med-alert.    Off the statin bc of the recent liver issues - ?PCKS-9i as an alternative.   Lp(a) due fasting. Given the premature ASO - check coronary calcium scoring. Will arrange for an appt with Dr. Romero given the significant hyperlipidemia - not on statin..     Follow up 6 months.   "

## 2024-08-14 NOTE — PROGRESS NOTES
Patient identification verified with 2 identifiers.    Location: [facility] - [address] [place] [address] [phone]     Referring Physician: Dr. Stephan Martins  Enrollment/ Re-enrollment date: 2025.    INR Goal: 2.0-3.0  INR monitoring is per AMS protocol.  Anticoagulation Medication: warfarin  Indication: Arterial Embolism, DVT and PAD    Subjective   Bleeding signs/symptoms: No.  Pt denies.    Bruising: No.  Pt denies.   Major bleeding event: No  Thrombosis signs/symptoms: No  Thromboembolic event: No  Missed doses: No.  Pt denies.  Extra doses: No.    Medication changes: No.  Pt denies.  Dietary changes: No.  Pt denies.  Change in health: No.    Change in activity: No  Alcohol: No  Other concerns: No    Upcoming Procedures:  Does the Patient Have any upcoming procedures that require interruption in anticoagulation therapy? no  Does the patient require bridging? no    Dose increase was made at last visit.  Pt is currently taking 22.5mg warfarin weekly.  Anticoagulation Summary  As of 2024      INR goal:  2.0-3.0   TTR:  58.7% (10.2 mo)   INR used for dosin.90 (2024)   Weekly warfarin total:  24 mg               Assessment/Plan   Subtherapeutic     1. New dose:  Will make a 6.7% dose increase based on pt's tablet stregth .  22.5mg weekly increased to 24mg warfarin weekly.    2. Next INR: 1 week.  Can r/s in 2 weeks if therapeutic next visit.  3. Pt is getting Ketamine infusion every month.       Education provided to patient during the visit:  Patient instructed to call in interim with questions, concerns and changes.   Patient educated on interactions between medications and warfarin.   Patient educated on dietary consistency in vitamin k consumption.   Patient educated on affects of alcohol consumption while taking warfarin.   Patient educated on signs of bleeding/clotting.   Patient educated on compliance with dosing, follow up appointments, and  prescribed plan of care.

## 2024-08-20 ENCOUNTER — LAB (OUTPATIENT)
Dept: LAB | Facility: LAB | Age: 64
End: 2024-08-20
Payer: MEDICARE

## 2024-08-20 ENCOUNTER — ANTICOAGULATION - WARFARIN VISIT (OUTPATIENT)
Dept: CARDIOLOGY | Facility: CLINIC | Age: 64
End: 2024-08-20
Payer: MEDICARE

## 2024-08-20 DIAGNOSIS — E53.8 VITAMIN B12 DEFICIENCY: ICD-10-CM

## 2024-08-20 DIAGNOSIS — D52.9 ANEMIA DUE TO FOLIC ACID DEFICIENCY, UNSPECIFIED DEFICIENCY TYPE: ICD-10-CM

## 2024-08-20 DIAGNOSIS — I10 ESSENTIAL HYPERTENSION: ICD-10-CM

## 2024-08-20 DIAGNOSIS — I73.9 PAD (PERIPHERAL ARTERY DISEASE) (CMS-HCC): ICD-10-CM

## 2024-08-20 DIAGNOSIS — K85.00 IDIOPATHIC ACUTE PANCREATITIS WITHOUT INFECTION OR NECROSIS (HHS-HCC): ICD-10-CM

## 2024-08-20 DIAGNOSIS — F33.1 MAJOR DEPRESSIVE DISORDER, RECURRENT EPISODE, MODERATE DEGREE (MULTI): ICD-10-CM

## 2024-08-20 DIAGNOSIS — K72.00 ACUTE LIVER FAILURE WITHOUT HEPATIC COMA (HHS-HCC): ICD-10-CM

## 2024-08-20 DIAGNOSIS — I82.411 ACUTE EMBOLISM AND THROMBOSIS OF RIGHT FEMORAL VEIN (MULTI): ICD-10-CM

## 2024-08-20 DIAGNOSIS — Z86.718 HISTORY OF DEEP VEIN THROMBOSIS: ICD-10-CM

## 2024-08-20 DIAGNOSIS — F51.04 CHRONIC INSOMNIA: ICD-10-CM

## 2024-08-20 DIAGNOSIS — E55.9 VITAMIN D DEFICIENCY: ICD-10-CM

## 2024-08-20 DIAGNOSIS — E78.2 MIXED HYPERLIPIDEMIA: ICD-10-CM

## 2024-08-20 DIAGNOSIS — E78.41 ELEVATED LIPOPROTEIN(A): ICD-10-CM

## 2024-08-20 DIAGNOSIS — I74.9 ARTERIAL EMBOLISM (MULTI): Primary | ICD-10-CM

## 2024-08-20 LAB
POC INR: 2.4 (ref 2–3)
POC PROTHROMBIN TIME: NORMAL

## 2024-08-20 PROCEDURE — 99211 OFF/OP EST MAY X REQ PHY/QHP: CPT

## 2024-08-20 PROCEDURE — 85025 COMPLETE CBC W/AUTO DIFF WBC: CPT

## 2024-08-20 PROCEDURE — 36415 COLL VENOUS BLD VENIPUNCTURE: CPT

## 2024-08-20 PROCEDURE — 80053 COMPREHEN METABOLIC PANEL: CPT

## 2024-08-20 PROCEDURE — 85610 PROTHROMBIN TIME: CPT | Mod: QW | Performed by: INTERNAL MEDICINE

## 2024-08-20 PROCEDURE — 82150 ASSAY OF AMYLASE: CPT

## 2024-08-20 PROCEDURE — 80061 LIPID PANEL: CPT

## 2024-08-20 PROCEDURE — 83690 ASSAY OF LIPASE: CPT

## 2024-08-20 PROCEDURE — 82172 ASSAY OF APOLIPOPROTEIN: CPT

## 2024-08-20 NOTE — PROGRESS NOTES
Patient identification verified with 2 identifiers.    Location: [facility] - [address] [place] [id number] [phone]     Referring Physician: Dr. Stephan Martins  Enrollment/ Re-enrollment date: 2025.    INR Goal: 2.0-3.0  INR monitoring is per [facility] protocol.  Anticoagulation Medication: warfarin  Indication: Arterial Embolism, DVT and PAD    Subjective   Bleeding signs/symptoms: No.  Pt denies.    Bruising: No.  Pt denies.   Major bleeding event: No  Thrombosis signs/symptoms: No  Thromboembolic event: No  Missed doses: No.  Pt denies.  Extra doses: No.    Medication changes: No.  Pt denies.  Dietary changes: No.  Pt denies.  Change in health: No.    Change in activity: No  Alcohol: No  Other concerns: No    Upcoming Procedures:  Does the Patient Have any upcoming procedures that require interruption in anticoagulation therapy? no  Does the patient require bridging? no    Dose increase was made at last visit by approx. 7%.  22.5mg of warfarin weekly Increased to 24mg weekly. Pt is taking 24mg of warfarin weekly.  Anticoagulation Summary  As of 2024      INR goal:  2.0-3.0   TTR:  59.1% (10.4 mo)   INR used for dosin.40 (2024)   Weekly warfarin total:  24 mg               Assessment/Plan   Therapeutic     1. New dose: no change.  Maintain current dose.  24mg weekly.   2. Next INR: 1 week.  Can r/s in 2 weeks if therapeutic next visit.  3. Pt is getting Ketamine infusion every month. Pt missed her Aug. Infusion at [facility] due to power outage.  Pt has not had one for 3 mos.  4. Pt going to [place], [place] the week of Labor Day.      Education provided to patient during the visit:  Patient instructed to call in interim with questions, concerns and changes.   Patient educated on interactions between medications and warfarin.   Patient educated on dietary consistency in vitamin k consumption.   Patient educated on affects of alcohol consumption while taking warfarin.    Patient educated on signs of bleeding/clotting.   Patient educated on compliance with dosing, follow up appointments, and prescribed plan of care.

## 2024-08-21 LAB
ALBUMIN SERPL BCP-MCNC: 4.3 G/DL (ref 3.4–5)
ALP SERPL-CCNC: 81 U/L (ref 33–136)
ALT SERPL W P-5'-P-CCNC: 22 U/L (ref 7–45)
AMYLASE SERPL-CCNC: 21 U/L (ref 29–103)
ANION GAP SERPL CALC-SCNC: 13 MMOL/L (ref 10–20)
AST SERPL W P-5'-P-CCNC: 25 U/L (ref 9–39)
BASOPHILS # BLD AUTO: 0.04 X10*3/UL (ref 0–0.1)
BASOPHILS NFR BLD AUTO: 0.8 %
BILIRUB SERPL-MCNC: 0.3 MG/DL (ref 0–1.2)
BUN SERPL-MCNC: 12 MG/DL (ref 6–23)
CALCIUM SERPL-MCNC: 9.9 MG/DL (ref 8.6–10.6)
CHLORIDE SERPL-SCNC: 104 MMOL/L (ref 98–107)
CHOLEST SERPL-MCNC: 273 MG/DL (ref 0–199)
CHOLESTEROL/HDL RATIO: 3.5
CO2 SERPL-SCNC: 27 MMOL/L (ref 21–32)
CREAT SERPL-MCNC: 0.86 MG/DL (ref 0.5–1.05)
EGFRCR SERPLBLD CKD-EPI 2021: 76 ML/MIN/1.73M*2
EOSINOPHIL # BLD AUTO: 0.11 X10*3/UL (ref 0–0.7)
EOSINOPHIL NFR BLD AUTO: 2.1 %
ERYTHROCYTE [DISTWIDTH] IN BLOOD BY AUTOMATED COUNT: 12.9 % (ref 11.5–14.5)
GLUCOSE SERPL-MCNC: 87 MG/DL (ref 74–99)
HCT VFR BLD AUTO: 42.3 % (ref 36–46)
HDLC SERPL-MCNC: 77.8 MG/DL
HGB BLD-MCNC: 13.6 G/DL (ref 12–16)
IMM GRANULOCYTES # BLD AUTO: 0.01 X10*3/UL (ref 0–0.7)
IMM GRANULOCYTES NFR BLD AUTO: 0.2 % (ref 0–0.9)
LDLC SERPL CALC-MCNC: 181 MG/DL
LIPASE SERPL-CCNC: 25 U/L (ref 9–82)
LYMPHOCYTES # BLD AUTO: 1.6 X10*3/UL (ref 1.2–4.8)
LYMPHOCYTES NFR BLD AUTO: 30.4 %
MCH RBC QN AUTO: 31.6 PG (ref 26–34)
MCHC RBC AUTO-ENTMCNC: 32.2 G/DL (ref 32–36)
MCV RBC AUTO: 98 FL (ref 80–100)
MONOCYTES # BLD AUTO: 0.58 X10*3/UL (ref 0.1–1)
MONOCYTES NFR BLD AUTO: 11 %
NEUTROPHILS # BLD AUTO: 2.93 X10*3/UL (ref 1.2–7.7)
NEUTROPHILS NFR BLD AUTO: 55.5 %
NON HDL CHOLESTEROL: 195 MG/DL (ref 0–149)
NRBC BLD-RTO: 0 /100 WBCS (ref 0–0)
PLATELET # BLD AUTO: 252 X10*3/UL (ref 150–450)
POTASSIUM SERPL-SCNC: 5 MMOL/L (ref 3.5–5.3)
PROT SERPL-MCNC: 6.7 G/DL (ref 6.4–8.2)
RBC # BLD AUTO: 4.31 X10*6/UL (ref 4–5.2)
SODIUM SERPL-SCNC: 139 MMOL/L (ref 136–145)
TRIGL SERPL-MCNC: 71 MG/DL (ref 0–149)
VLDL: 14 MG/DL (ref 0–40)
WBC # BLD AUTO: 5.3 X10*3/UL (ref 4.4–11.3)

## 2024-08-23 LAB — LPA SERPL-MCNC: 51 MG/DL

## 2024-08-30 ENCOUNTER — ANTICOAGULATION - WARFARIN VISIT (OUTPATIENT)
Dept: CARDIOLOGY | Facility: CLINIC | Age: 64
End: 2024-08-30
Payer: MEDICARE

## 2024-08-30 DIAGNOSIS — I74.9 ARTERIAL EMBOLISM (MULTI): Primary | ICD-10-CM

## 2024-08-30 DIAGNOSIS — I73.9 PAD (PERIPHERAL ARTERY DISEASE) (CMS-HCC): ICD-10-CM

## 2024-08-30 DIAGNOSIS — Z86.718 HISTORY OF DEEP VEIN THROMBOSIS: ICD-10-CM

## 2024-08-30 LAB
POC INR: 2.9
POC PROTHROMBIN TIME: NORMAL

## 2024-08-30 PROCEDURE — 85610 PROTHROMBIN TIME: CPT | Mod: QW

## 2024-08-30 PROCEDURE — 99211 OFF/OP EST MAY X REQ PHY/QHP: CPT

## 2024-08-30 NOTE — PROGRESS NOTES
Patient identification verified with 2 identifiers.    Location: [facility] - [address] [place] [id number] [phone]     Referring Physician: Dr. Stephan Martins  Enrollment/ Re-enrollment date: 25   INR Goal: 2.0-3.0  INR monitoring is per [facility] protocol.  Anticoagulation Medication: warfarin  Indication: Peripheral Artery Disease (PAD)    Subjective   Bleeding signs/symptoms: No    Bruising: No   Major bleeding event: No  Thrombosis signs/symptoms: No  Thromboembolic event: No  Missed doses: No  Extra doses: No  Medication changes: No  Dietary changes: No  Change in health: No  Change in activity: No  Alcohol: No  Other concerns: No    Upcoming Procedures:  Does the Patient Have any upcoming procedures that require interruption in anticoagulation therapy? no  Does the patient require bridging? no      Anticoagulation Summary  As of 2024      INR goal:  2.0-3.0   TTR:  60.4% (10.7 mo)   INR used for dosin.90 (2024)   Weekly warfarin total:  24 mg               Assessment/Plan   Therapeutic     1. New dose: no change    2. Next INR: 2 weeks      Education provided to patient during the visit:  Patient instructed to call in interim with questions, concerns and changes.   Patient educated on interactions between medications and warfarin.   Patient educated on dietary consistency in vitamin k consumption.   Patient educated on affects of alcohol consumption while taking warfarin.   Patient educated on signs of bleeding/clotting.   Patient educated on compliance with dosing, follow up appointments, and prescribed plan of care.

## 2024-09-03 ENCOUNTER — OFFICE VISIT (OUTPATIENT)
Dept: PAIN MEDICINE | Facility: CLINIC | Age: 64
End: 2024-09-03
Payer: MEDICARE

## 2024-09-03 VITALS
WEIGHT: 171 LBS | HEART RATE: 84 BPM | RESPIRATION RATE: 20 BRPM | OXYGEN SATURATION: 98 % | HEIGHT: 64 IN | TEMPERATURE: 98.4 F | BODY MASS INDEX: 29.19 KG/M2 | SYSTOLIC BLOOD PRESSURE: 140 MMHG | DIASTOLIC BLOOD PRESSURE: 76 MMHG

## 2024-09-03 DIAGNOSIS — M54.16 LUMBAR RADICULAR PAIN: ICD-10-CM

## 2024-09-03 DIAGNOSIS — G89.29 CHRONIC PAIN OF RIGHT KNEE: Primary | ICD-10-CM

## 2024-09-03 DIAGNOSIS — G89.4 CHRONIC PAIN SYNDROME: ICD-10-CM

## 2024-09-03 DIAGNOSIS — I73.9 PAD (PERIPHERAL ARTERY DISEASE) (CMS-HCC): ICD-10-CM

## 2024-09-03 DIAGNOSIS — M25.561 CHRONIC PAIN OF RIGHT KNEE: Primary | ICD-10-CM

## 2024-09-03 DIAGNOSIS — M54.31 SCIATICA OF RIGHT SIDE: ICD-10-CM

## 2024-09-03 PROCEDURE — 99214 OFFICE O/P EST MOD 30 MIN: CPT | Performed by: NURSE PRACTITIONER

## 2024-09-03 RX ORDER — METHYLPREDNISOLONE 4 MG/1
TABLET ORAL
Qty: 21 TABLET | Refills: 0 | Status: SHIPPED | OUTPATIENT
Start: 2024-09-03 | End: 2024-09-10

## 2024-09-03 RX ORDER — GABAPENTIN 600 MG/1
600 TABLET ORAL 3 TIMES DAILY
Qty: 90 TABLET | Refills: 3 | Status: SHIPPED | OUTPATIENT
Start: 2024-09-09 | End: 2025-01-07

## 2024-09-03 RX ORDER — OXYCODONE HYDROCHLORIDE 5 MG/1
7.5 TABLET ORAL 2 TIMES DAILY PRN
Qty: 90 TABLET | Refills: 0 | Status: SHIPPED | OUTPATIENT
Start: 2024-09-06 | End: 2024-10-06

## 2024-09-03 ASSESSMENT — ENCOUNTER SYMPTOMS
HEADACHES: 0
DIFFICULTY URINATING: 0
DIARRHEA: 0
CHILLS: 0
AGITATION: 0
NAUSEA: 0
CHEST TIGHTNESS: 0
PALPITATIONS: 0
MYALGIAS: 1
SLEEP DISTURBANCE: 0
DEPRESSION: 0
WEAKNESS: 0
LOSS OF SENSATION IN FEET: 1
OCCASIONAL FEELINGS OF UNSTEADINESS: 1
SHORTNESS OF BREATH: 0
WHEEZING: 0
NUMBNESS: 1
FATIGUE: 0
CONFUSION: 0

## 2024-09-03 ASSESSMENT — PAIN SCALES - GENERAL
PAINLEVEL_OUTOF10: 5 - MODERATE PAIN
PAINLEVEL: 5

## 2024-09-03 ASSESSMENT — PAIN - FUNCTIONAL ASSESSMENT: PAIN_FUNCTIONAL_ASSESSMENT: 0-10

## 2024-09-03 NOTE — PROGRESS NOTES
Chief Complain  Follow-up visit for chronic right knee pain radiating to lower extremity pain.    History Of Present Illness  Angle Martins is a 64 y.o. female here for chronic right knee pain radiating to right foot. The patient rates the pain at 5 on a scale from 0-10.  The patient describes pain as sharp, aching, radiating, burning.  The pain is worsened by standing, prolonged sitting, and walking and is alleviated by medications opioid analgesics, position change, sitting, and IV infusion therapy .  Since the last visit the pain has worsened.  Due to missed IV infusion therapy.  The patient denies any fever, chills, weight loss, weakness, bladder/ bowel incontinence, history of cancer, history of IV drug abuse, recent trauma.     Portions of record reviewed for pertinent issues: active problem list, medication list, allergies, family history, social history, notes from last encounter, encounters, lab results, imaging and other available records.      I have personally reviewed the OARRS report for this patient. This report is scanned into the electronic medical record. I have considered the risks of abuse, dependence, addiction and diversion. It showed: Oxycodone IR 5 mg, gabapentin 600 mg hydrocodone acetaminophen Dr. Velazquez  OPIOID RISK ASSESSMENT SCORE 0/26  Opioid agreement: 3/4/2024  Activities of daily living: Very active  Adverse effects: None  Analgesia: W/O: 6/10, W 3/10    Toxicology screen: 3/14/2024  Aberrant behavior: None    Past Medical History  She has a past medical history of COVID-19 and Personal history of other venous thrombosis and embolism.    Surgical History  She has a past surgical history that includes Other surgical history (07/08/2022); Other surgical history (07/08/2022); Other surgical history (07/08/2022); CT angio aorta and bilateral iliofemoral runoff w and or wo IV contrast (12/8/2022); and CT angio aorta and bilateral iliofemoral runoff w and or wo IV contrast (12/13/2022).      Social History  She reports that she quit smoking about 36 years ago. Her smoking use included cigarettes. She has never used smokeless tobacco. She reports current alcohol use. She reports that she does not use drugs.    Family History  No family history on file.     Allergies  Promethazine and Phenothiazines    Review of Systems  Review of Systems   Constitutional:  Negative for chills and fatigue.   Respiratory:  Negative for chest tightness, shortness of breath and wheezing.    Cardiovascular:  Negative for chest pain and palpitations.   Gastrointestinal:  Negative for diarrhea and nausea.   Genitourinary:  Negative for difficulty urinating and urgency.   Musculoskeletal:  Positive for myalgias.        Hx of right leg vascular surgeries complicated by compartment syndrome, and suffered liver infarction and hemoptysis  Chronic right knee pain radiating to toes.    Neurological:  Positive for numbness. Negative for weakness and headaches.   Psychiatric/Behavioral:  Negative for agitation, confusion, sleep disturbance and suicidal ideas.         Physical Exam  Physical Exam  Constitutional:       General: She is not in acute distress.     Appearance: Normal appearance. She is normal weight.   Eyes:      Extraocular Movements: Extraocular movements intact.   Musculoskeletal:         General: Tenderness present.      Right knee: Tenderness present.   Neurological:      General: No focal deficit present.      Mental Status: She is alert and oriented to person, place, and time.      GCS: GCS eye subscore is 4. GCS verbal subscore is 5. GCS motor subscore is 6.      Cranial Nerves: Cranial nerves 2-12 are intact.      Sensory: Sensation is intact.      Motor: Motor function is intact.      Coordination: Coordination is intact.      Gait: Gait is intact.   Psychiatric:         Mood and Affect: Mood normal.         Thought Content: Thought content normal.         Judgment: Judgment normal.           Last Recorded  "Vitals  Blood pressure 140/76, pulse 84, temperature 36.9 °C (98.4 °F), resp. rate 20, height 1.626 m (5' 4\"), weight 77.6 kg (171 lb), SpO2 98%.      Reviewed Labs  Lab Results   Component Value Date    GLUCOSE 87 08/20/2024    CALCIUM 9.9 08/20/2024     08/20/2024    K 5.0 08/20/2024    CO2 27 08/20/2024     08/20/2024    BUN 12 08/20/2024    CREATININE 0.86 08/20/2024         Assessment/Plan     Angle Martins is a 64 y.o. female here for follow-up chronic right knee pain radiating to her foot.  Symptoms started after  emergent thrombectomy of right lower extremity complicated by compartment syndrome.  She was started on chronic opioid therapy receiving Norco 10 mg twice a day and gabapentin which was providing 80 to 90% relief of pain in conjunction with IV infusion therapy once a month.  This commendation significantly improves her quality of life and ADLs.  However her IV infusion was canceled due to power outage.  Of note in June she suffered a liver infarction and was hospitalized with elevated liver enzymes.  Her liver enzymes have improved however she is still recommended to avoid acetaminophen.  She was transition to oxycodone 7.5 mg immediate release twice daily.  This provides moderate relief of right extremity pain.  She is leaving for vacation to tomorrow morning.  Provided a Medrol Dosepak for pain and inflammation.  Will continue with current pain medication regiment.  Provided refill of oxycodone and gabapentin.  Courage patient to apply ice to affected area 20 minutes on 20 minutes off.  She is scheduled for IV infusion therapy on 9/10/24.  Plan to follow-up in 3 months or sooner if needed.    Plan  At least 50% of the visit was involved in the discussion of the options for treatment. We discussed exercises, medication, interventional therapies and surgery. Healthy life style is essential with patient hard work to achieve the wellness. In addition; discussion with the patient and/or " family about any of the diagnostic results, impressions and/or recommended diagnostic studies, prognosis, risks and benefits of treatment options, instructions for treatment and/or follow-up, importance of compliance with chosen treatment options, risk-factor reduction, and patient/family education.        *Please note this report has been produced using speech recognition software and may contain errors related to that system including grammar, punctuation and spelling as well as words and phrases that may be inappropriate. If there are questions or concerns, please feel free to contact me to clarify.    Patient is being treated with opioid therapy for pain and is responding appropriately.  There are NO signs of opioid intoxication, abuse, addiction or withdrawal.  Pupils are equal, reactive to light bilateral, appropriate speech and cognition. Patient denies any opioid induced constipation. The OARRS registry followed periodically, urine toxicology completed and appropriate.     Patient is advised and warned  in specific detail about potential benefits of opioids along with risks and side effects including, but not limited to, dependency, addiction, tolerance, hyperalgesia, anxiety, depression, insomnia, endocrine changes, immunologic disturbances, respiratory depression and death.     Caution advised regarding the use of medications prescribed at this office and specific mention made regarding not to drive or operate heavy machinery if feeling side effects from this the medications. Patient  expressed an understanding in regards to these particular concerns.     Discussed non-opioid options including (But no limited), physical wellness, antiinflammatory diet, icing and heating, meditation, relaxation, massage and acupuncture.    We will continue to monitor and adjust medications as needed.     I spent 40 minutes in the professional and overall care of this patient.         Ascencion Huertas, APRN-CNP

## 2024-09-05 DIAGNOSIS — U09.9 POST-COVID CHRONIC COUGH: ICD-10-CM

## 2024-09-05 DIAGNOSIS — R05.3 POST-COVID CHRONIC COUGH: ICD-10-CM

## 2024-09-05 DIAGNOSIS — R29.818 NEUROGENIC CLAUDICATION: ICD-10-CM

## 2024-09-05 DIAGNOSIS — M54.16 LUMBAR RADICULAR PAIN: Primary | ICD-10-CM

## 2024-09-05 RX ORDER — KETOROLAC TROMETHAMINE 30 MG/ML
30 INJECTION, SOLUTION INTRAMUSCULAR; INTRAVENOUS ONCE
OUTPATIENT
Start: 2024-09-10 | End: 2024-09-10

## 2024-09-05 RX ORDER — KETAMINE HCL IN NACL, ISO-OSM 100MG/10ML
SYRINGE (ML) INJECTION ONCE
OUTPATIENT
Start: 2024-09-10

## 2024-09-10 ENCOUNTER — APPOINTMENT (OUTPATIENT)
Dept: INFUSION THERAPY | Facility: CLINIC | Age: 64
End: 2024-09-10
Payer: MEDICARE

## 2024-09-13 ENCOUNTER — ANTICOAGULATION - WARFARIN VISIT (OUTPATIENT)
Dept: CARDIOLOGY | Facility: CLINIC | Age: 64
End: 2024-09-13
Payer: MEDICARE

## 2024-09-13 ENCOUNTER — APPOINTMENT (OUTPATIENT)
Dept: CARDIOLOGY | Facility: CLINIC | Age: 64
End: 2024-09-13
Payer: MEDICARE

## 2024-09-13 DIAGNOSIS — I74.9 ARTERIAL EMBOLISM (MULTI): Primary | ICD-10-CM

## 2024-09-13 DIAGNOSIS — Z86.718 HISTORY OF DEEP VEIN THROMBOSIS: ICD-10-CM

## 2024-09-13 DIAGNOSIS — I73.9 PAD (PERIPHERAL ARTERY DISEASE) (CMS-HCC): ICD-10-CM

## 2024-09-13 NOTE — PROGRESS NOTES
Pt called [facility] and I spoke to her.  Pt has emergency, is in the ER with her sister after her sister fell.  I r/s Pt's INR appt. To Tues. 9/17/24 per her request.

## 2024-09-17 ENCOUNTER — ANTICOAGULATION - WARFARIN VISIT (OUTPATIENT)
Dept: CARDIOLOGY | Facility: CLINIC | Age: 64
End: 2024-09-17
Payer: MEDICARE

## 2024-09-17 DIAGNOSIS — I74.9 ARTERIAL EMBOLISM (MULTI): Primary | ICD-10-CM

## 2024-09-17 DIAGNOSIS — Z86.718 HISTORY OF DEEP VEIN THROMBOSIS: ICD-10-CM

## 2024-09-17 DIAGNOSIS — I73.9 PAD (PERIPHERAL ARTERY DISEASE) (CMS-HCC): ICD-10-CM

## 2024-09-17 LAB
POC INR: 2.4
POC PROTHROMBIN TIME: NORMAL

## 2024-09-17 PROCEDURE — 99211 OFF/OP EST MAY X REQ PHY/QHP: CPT

## 2024-09-17 PROCEDURE — 85610 PROTHROMBIN TIME: CPT | Mod: QW

## 2024-09-17 NOTE — PROGRESS NOTES
Patient identification verified with 2 identifiers.    Location: [facility] - [address] [place] [id number] [phone]     Referring Physician: Dr. Stephan Martins  Enrollment/ Re-enrollment date: 25   INR Goal: 2.0-3.0  INR monitoring is per [facility] protocol.  Anticoagulation Medication: warfarin  Indication: Peripheral Artery Disease (PAD)    Subjective   Bleeding signs/symptoms: No    Bruising: No   Major bleeding event: No  Thrombosis signs/symptoms: No  Thromboembolic event: No  Missed doses: No  Extra doses: No  Medication changes: No  Dietary changes: No  Change in health: No  Change in activity: No  Alcohol: No  Other concerns: No    Upcoming Procedures:  Does the Patient Have any upcoming procedures that require interruption in anticoagulation therapy? no  Does the patient require bridging? no    Dose maintained at last visit.  Pt is currently taking 24mg warfarin weekly.  Anticoagulation Summary  As of 2024      INR goal:  2.0-3.0   TTR:  62.5% (11.3 mo)   INR used for dosin.40 (2024)   Weekly warfarin total:  24 mg               Assessment/Plan   Therapeutic     1. New dose: no change    2. Next INR: 1 month      Education provided to patient during the visit:  Patient instructed to call in interim with questions, concerns and changes.   Patient educated on interactions between medications and warfarin.   Patient educated on dietary consistency in vitamin k consumption.   Patient educated on affects of alcohol consumption while taking warfarin.   Patient educated on signs of bleeding/clotting.   Patient educated on compliance with dosing, follow up appointments, and prescribed plan of care.

## 2024-09-19 ENCOUNTER — PATIENT OUTREACH (OUTPATIENT)
Dept: PRIMARY CARE | Facility: CLINIC | Age: 64
End: 2024-09-19
Payer: MEDICARE

## 2024-09-19 NOTE — PROGRESS NOTES
Final call. Called and spoke with patient to address any questions or concerns regarding hospitalization.   Patient reports their condition has (improved).   Patient encouraged to keep my contact information in case any needs arise.

## 2024-09-26 ENCOUNTER — OFFICE VISIT (OUTPATIENT)
Dept: CARDIOLOGY | Facility: CLINIC | Age: 64
End: 2024-09-26
Payer: MEDICARE

## 2024-09-26 VITALS
HEIGHT: 64 IN | DIASTOLIC BLOOD PRESSURE: 80 MMHG | OXYGEN SATURATION: 96 % | SYSTOLIC BLOOD PRESSURE: 123 MMHG | HEART RATE: 85 BPM | BODY MASS INDEX: 28.85 KG/M2 | WEIGHT: 169 LBS

## 2024-09-26 DIAGNOSIS — R74.8 ABNORMAL TRANSAMINASES: ICD-10-CM

## 2024-09-26 DIAGNOSIS — E78.41 ELEVATED LIPOPROTEIN(A): ICD-10-CM

## 2024-09-26 DIAGNOSIS — E78.5 HYPERLIPIDEMIA, UNSPECIFIED HYPERLIPIDEMIA TYPE: ICD-10-CM

## 2024-09-26 DIAGNOSIS — I73.9 PAD (PERIPHERAL ARTERY DISEASE) (CMS-HCC): ICD-10-CM

## 2024-09-26 DIAGNOSIS — E78.00 PURE HYPERCHOLESTEROLEMIA: Primary | ICD-10-CM

## 2024-09-26 DIAGNOSIS — Z78.9 STATIN INTOLERANCE: ICD-10-CM

## 2024-09-26 PROCEDURE — 3074F SYST BP LT 130 MM HG: CPT | Performed by: INTERNAL MEDICINE

## 2024-09-26 PROCEDURE — 3008F BODY MASS INDEX DOCD: CPT | Performed by: INTERNAL MEDICINE

## 2024-09-26 PROCEDURE — 99214 OFFICE O/P EST MOD 30 MIN: CPT | Performed by: INTERNAL MEDICINE

## 2024-09-26 PROCEDURE — 1036F TOBACCO NON-USER: CPT | Performed by: INTERNAL MEDICINE

## 2024-09-26 PROCEDURE — 3079F DIAST BP 80-89 MM HG: CPT | Performed by: INTERNAL MEDICINE

## 2024-09-26 RX ORDER — EVOLOCUMAB 140 MG/ML
140 INJECTION, SOLUTION SUBCUTANEOUS
Qty: 6 ML | Refills: 3 | Status: SHIPPED | OUTPATIENT
Start: 2024-09-26 | End: 2025-09-26

## 2024-09-26 ASSESSMENT — ENCOUNTER SYMPTOMS
CARDIOVASCULAR NEGATIVE: 1
CONSTITUTIONAL NEGATIVE: 1
ENDOCRINE NEGATIVE: 1
NEUROLOGICAL NEGATIVE: 1
PSYCHIATRIC NEGATIVE: 1
HEMATOLOGIC/LYMPHATIC NEGATIVE: 1
EYES NEGATIVE: 1
MUSCULOSKELETAL NEGATIVE: 1
ALLERGIC/IMMUNOLOGIC NEGATIVE: 1
GASTROINTESTINAL NEGATIVE: 1
RESPIRATORY NEGATIVE: 1

## 2024-09-26 ASSESSMENT — COLUMBIA-SUICIDE SEVERITY RATING SCALE - C-SSRS
2. HAVE YOU ACTUALLY HAD ANY THOUGHTS OF KILLING YOURSELF?: NO
1. IN THE PAST MONTH, HAVE YOU WISHED YOU WERE DEAD OR WISHED YOU COULD GO TO SLEEP AND NOT WAKE UP?: NO
6. HAVE YOU EVER DONE ANYTHING, STARTED TO DO ANYTHING, OR PREPARED TO DO ANYTHING TO END YOUR LIFE?: NO

## 2024-09-26 ASSESSMENT — PATIENT HEALTH QUESTIONNAIRE - PHQ9
1. LITTLE INTEREST OR PLEASURE IN DOING THINGS: NOT AT ALL
2. FEELING DOWN, DEPRESSED OR HOPELESS: NOT AT ALL
SUM OF ALL RESPONSES TO PHQ9 QUESTIONS 1 AND 2: 0

## 2024-09-26 ASSESSMENT — PAIN SCALES - GENERAL: PAINLEVEL: 0-NO PAIN

## 2024-09-26 NOTE — PROGRESS NOTES
Preventive Cardiology Clinic Note    Reason for Visit: Hyperlipidemia  Referring Clinician: No ref. provider found     History of Present Illness: Angle Martins is a 64 y.o. female with multiple and recurrent arterial thromboembolic disease with peripheral arterial disease complicated by hypercholesterolemia and recent acute liver injury with transaminitis who was referred by her vascular medicine physician for consideration of PCSK9 inhibitor therapy.  She recently had an acute liver injury related to infarction of the liver with severe transaminitis and was told to stop her statin medication as this may exacerbate her liver injury.  After this occurred her cholesterol increased significantly and she comes here today looking for alternatives to statins.  This is also important because she was found to have an elevated LP(a) level which can also contribute to thromboembolic disease and is not reduced with statin therapy but can be reduced to modest degree with PCSK9 inhibition.  She does not have any current chest pain, shortness of breath, palpitations, or syncope.  She does not have any contraindications to PCSK9 inhibitor therapy.  She is a non-smoker.  She does have extensive family history of thromboembolic disease in multiple family members including her sister, her son, and her niece.    Past Medical History:  She has a past medical history of COVID-19 and Personal history of other venous thrombosis and embolism.    Past Surgical History:  She has a past surgical history that includes Other surgical history (07/08/2022); Other surgical history (07/08/2022); Other surgical history (07/08/2022); CT angio aorta and bilateral iliofemoral runoff w and or wo IV contrast (12/8/2022); and CT angio aorta and bilateral iliofemoral runoff w and or wo IV contrast (12/13/2022).    Social History:  She reports that she quit smoking about 36 years ago. Her smoking use included cigarettes. She has never used smokeless  tobacco. She reports current alcohol use. She reports that she does not use drugs.    Family History:  No family history on file.    Allergies:  Promethazine and Phenothiazines    Outpatient Medications:  Current Outpatient Medications   Medication Instructions    alpha lipoic acid 600 mg capsule 1 capsule, oral, 2 times daily    aspirin 81 mg, oral, Every morning    buPROPion XL (Wellbutrin XL) 150 mg 24 hr tablet Please take 1 tablet by mouth with BREAKFAST every morning.  Do not crush, chew, or split.    cholecalciferol (Vitamin D-3) 50,000 unit capsule TAKE 1 CAPSULE Weekly SUNDAYS PLEASE with food and full glass water. Thank you    cyanocobalamin (Vitamin B-12) 100 mcg tablet Please take 1 tablet by mouth with LUNCH every day.  Thank you.    dilTIAZem XR (DILACOR XR) 120 mg, oral, Daily, Take 1 capsule once daily.    DULoxetine (CYMBALTA) 60 mg, oral, Daily, Do not crush or chew.    famotidine (Pepcid) 20 mg tablet Please take 1 tablet by mouth before breakfast time, and again 1 tablet by mouth before suppertime.  Please take twice a day even if you are not going to eat.  Thank you.    folic acid (Folvite) 1 mg tablet Please take 1 tablet by mouth with lunch every day.  Thank you.    gabapentin (NEURONTIN) 600 mg, oral, 3 times daily    ipratropium-albuteroL (Duo-Neb) 0.5-2.5 mg/3 mL nebulizer solution 3 mL, inhalation, 3 times daily PRN    ketamine HCl in 0.9 % NaCl (ketamine in 0.9 % sod chloride) 10 mg/mL solution intravenous    melatonin 5 mg tablet Please take 1 tablet by mouth after supper every evening.  Watch out for daytime sleepiness.  No driving if drowsy.  Thank you.    naloxone (NARCAN) 4 mg, nasal, As needed, May repeat every 2-3 minutes if needed, alternating nostrils, until medical assistance becomes available.    ondansetron ODT (Zofran-ODT) 4 mg disintegrating tablet Please melt on tongue every 6-8 hours as needed for nausea.  Lots of fluids throughout the day.  Thank you.    oxyCODONE  "(ROXICODONE) 7.5 mg, oral, 2 times daily PRN    pancrelipase, Lip-Prot-Amyl, (Creon) 12,000-38,000 -60,000 unit capsule Please take 2 capsules by mouth 3 times a day with meals, and please take 1 capsule twice a day for snacks.  Thank you.    pantoprazole (PROTONIX) 40 mg, oral, Every morning    polyethylene glycol (Glycolax, Miralax) 17 gram packet 0.5 packets, oral, Daily PRN    traZODone (Desyrel) 50 mg tablet 0.5 tablets, oral, Nightly PRN    warfarin (COUMADIN) 3 mg, oral, Nightly, Take as directed per After Visit Summary.       Review of Systems:  Review of Systems   Constitutional: Negative.   HENT: Negative.     Eyes: Negative.    Cardiovascular: Negative.    Respiratory: Negative.     Endocrine: Negative.    Hematologic/Lymphatic: Negative.    Skin: Negative.    Musculoskeletal: Negative.    Gastrointestinal: Negative.    Genitourinary: Negative.    Neurological: Negative.    Psychiatric/Behavioral: Negative.     Allergic/Immunologic: Negative.        Last Recorded Vitals:  Vitals:    09/26/24 1408   BP: 123/80   BP Location: Left arm   Patient Position: Sitting   Pulse: 85   SpO2: 96%   Weight: 76.7 kg (169 lb)   Height: 1.626 m (5' 4\")       Physical Examination:  General: Well appearing, well-nourished, in no acute distress.  HEENT: Normocephalic atraumatic, pupils equal and reactive to light, extraocular muscles intact, no conjunctival injection, oropharynx clear without exudates.  Neck: Normal carotid arterial pulses, no arterial bruits, no thyromegaly.  Cardiac: Regular rhythm and normal heart rate.  S1, S2 present and normal.  No murmurs, rubs, or gallops.  PMI is nondisplaced. Jugular venous pulsations are normal.  Pulmonary: Normal breath sounds, no increased work of breathing, no wheezes or crackles.  GI: Normal bowel sounds, abdominal aorta not enlarged, no hepatosplenomegaly, no abdominal bruits.  Lower extremities: No cyanosis, clubbing, or edema.  No xanthelasma present. Normal distal " "pulses.  Skin: Skin intact. No significant rashes or lesions present.  Neuro: Alert and oriented x 3, normal attention and cognition, no focal motor or sensory neurologic deficits.  Psych: Normal affect and mood.  Musculoskeletal: Normal gait normal muscle tone.    Laboratory Studies:  Lab Results   Component Value Date    GLUCOSE 87 08/20/2024    CALCIUM 9.9 08/20/2024     08/20/2024    K 5.0 08/20/2024    CO2 27 08/20/2024     08/20/2024    BUN 12 08/20/2024    CREATININE 0.86 08/20/2024     Lab Results   Component Value Date    ALT 22 08/20/2024    AST 25 08/20/2024    ALKPHOS 81 08/20/2024    BILITOT 0.3 08/20/2024         Lab Results   Component Value Date    CHOL 273 (H) 08/20/2024    CHOL 261 (H) 07/01/2024    CHOL 185 02/01/2024     Lab Results   Component Value Date    HDL 77.8 08/20/2024    HDL 63.4 07/01/2024    HDL 70.0 02/01/2024     Lab Results   Component Value Date    LDLCALC 181 (H) 08/20/2024    LDLCALC 182 (H) 07/01/2024    LDLCALC 100 (H) 02/01/2024     Lab Results   Component Value Date    TRIG 71 08/20/2024    TRIG 79 07/01/2024    TRIG 74 02/01/2024     No components found for: \"CHOLHDL\"  Lab Results   Component Value Date    HGBA1C 5.1 06/18/2024     Lp(a) 51 (normal <=29 mg/dl)    CT Chest 6/2024:   No coronary artery calcifications are seen.    Assessment and Plan:  Problem List Items Addressed This Visit          Cardiac and Vasculature    PAD (peripheral artery disease) (CMS-HCC)    Pure hypercholesterolemia - Primary    Elevated lipoprotein(a)     Other Visit Diagnoses       Statin intolerance        Abnormal transaminases        Hyperlipidemia, unspecified hyperlipidemia type              Angle Martins is a 64 y.o. female with multiple and recurrent arterial thromboembolic disease with peripheral arterial disease complicated by hypercholesterolemia and recent acute liver injury with transaminitis who was referred by her vascular medicine physician for consideration of PCSK9 " inhibitor therapy.  It is reasonable to try PCSK9 inhibitor therapy first given the constellation of hypercholesterolemia, peripheral arterial disease, recurrent arterial thromboses, and an elevated LP(a) level.  I described the risks and benefits of the medication and potential side effects.  I also demonstrated how to use the medication in the office.  I sent a prescription to her preferred pharmacy and also ordered a repeat lipid panel and LP(a) level to be done in 3 months.  After above testing is completed, I will contact her with results and further recommendations.  Please do not hesitate or contact me with questions or concerns.    Leonardo Romero MD, FAHA, FACC  Director,  [facility]  Director,  [facility] Program  Associate Professor of Medicine  [employer]

## 2024-09-27 ENCOUNTER — APPOINTMENT (OUTPATIENT)
Dept: PRIMARY CARE | Facility: CLINIC | Age: 64
End: 2024-09-27
Payer: MEDICARE

## 2024-10-09 ENCOUNTER — HOSPITAL ENCOUNTER (OUTPATIENT)
Dept: RADIOLOGY | Facility: HOSPITAL | Age: 64
Discharge: HOME | End: 2024-10-09
Payer: MEDICARE

## 2024-10-09 ENCOUNTER — TELEPHONE (OUTPATIENT)
Dept: PAIN MEDICINE | Facility: CLINIC | Age: 64
End: 2024-10-09
Payer: MEDICARE

## 2024-10-09 DIAGNOSIS — M54.16 LUMBAR RADICULAR PAIN: ICD-10-CM

## 2024-10-09 DIAGNOSIS — I73.9 PAD (PERIPHERAL ARTERY DISEASE) (CMS-HCC): ICD-10-CM

## 2024-10-09 DIAGNOSIS — G89.4 CHRONIC PAIN SYNDROME: ICD-10-CM

## 2024-10-09 DIAGNOSIS — E78.41 ELEVATED LIPOPROTEIN(A): ICD-10-CM

## 2024-10-09 PROCEDURE — 75571 CT HRT W/O DYE W/CA TEST: CPT

## 2024-10-09 RX ORDER — OXYCODONE HYDROCHLORIDE 5 MG/1
7.5 TABLET ORAL 2 TIMES DAILY PRN
Qty: 90 TABLET | Refills: 0 | Status: SHIPPED | OUTPATIENT
Start: 2024-10-11 | End: 2024-11-10

## 2024-10-15 ENCOUNTER — APPOINTMENT (OUTPATIENT)
Dept: CARDIOLOGY | Facility: CLINIC | Age: 64
End: 2024-10-15
Payer: MEDICARE

## 2024-10-15 ENCOUNTER — ANTICOAGULATION - WARFARIN VISIT (OUTPATIENT)
Dept: CARDIOLOGY | Facility: CLINIC | Age: 64
End: 2024-10-15
Payer: MEDICARE

## 2024-10-15 DIAGNOSIS — I74.9 ARTERIAL EMBOLISM (MULTI): Primary | ICD-10-CM

## 2024-10-15 DIAGNOSIS — I73.9 PAD (PERIPHERAL ARTERY DISEASE) (CMS-HCC): ICD-10-CM

## 2024-10-15 DIAGNOSIS — Z86.718 HISTORY OF DEEP VEIN THROMBOSIS: ICD-10-CM

## 2024-10-15 NOTE — PROGRESS NOTES
Pt came in for INR appt. On 10/15 but left immediately for family emergency.  I called and spoke to Pt.  INR appt. r/s for next Tues. 10/22 in the PM per PT request.

## 2024-10-16 ENCOUNTER — INFUSION (OUTPATIENT)
Dept: INFUSION THERAPY | Facility: CLINIC | Age: 64
End: 2024-10-16
Payer: MEDICARE

## 2024-10-16 VITALS
HEART RATE: 70 BPM | DIASTOLIC BLOOD PRESSURE: 57 MMHG | TEMPERATURE: 96.4 F | OXYGEN SATURATION: 97 % | RESPIRATION RATE: 12 BRPM | SYSTOLIC BLOOD PRESSURE: 139 MMHG

## 2024-10-16 DIAGNOSIS — R05.3 POST-COVID CHRONIC COUGH: ICD-10-CM

## 2024-10-16 DIAGNOSIS — U09.9 POST-COVID CHRONIC COUGH: ICD-10-CM

## 2024-10-16 DIAGNOSIS — R29.818 NEUROGENIC CLAUDICATION: ICD-10-CM

## 2024-10-16 DIAGNOSIS — M54.16 LUMBAR RADICULAR PAIN: Primary | ICD-10-CM

## 2024-10-16 PROCEDURE — 96375 TX/PRO/DX INJ NEW DRUG ADDON: CPT | Mod: INF

## 2024-10-16 PROCEDURE — 96368 THER/DIAG CONCURRENT INF: CPT | Mod: INF

## 2024-10-16 PROCEDURE — 2500000004 HC RX 250 GENERAL PHARMACY W/ HCPCS (ALT 636 FOR OP/ED): Performed by: NURSE PRACTITIONER

## 2024-10-16 PROCEDURE — 96365 THER/PROPH/DIAG IV INF INIT: CPT | Mod: INF

## 2024-10-16 RX ORDER — NITROGLYCERIN 0.4 MG/1
0.4 TABLET SUBLINGUAL ONCE
OUTPATIENT
Start: 2024-11-15 | End: 2024-11-15

## 2024-10-16 RX ORDER — KETOROLAC TROMETHAMINE 30 MG/ML
30 INJECTION, SOLUTION INTRAMUSCULAR; INTRAVENOUS ONCE
Status: COMPLETED | OUTPATIENT
Start: 2024-10-16 | End: 2024-10-16

## 2024-10-16 RX ORDER — FAMOTIDINE 10 MG/ML
20 INJECTION INTRAVENOUS ONCE AS NEEDED
OUTPATIENT
Start: 2024-11-15

## 2024-10-16 RX ORDER — EPINEPHRINE 0.3 MG/.3ML
0.3 INJECTION SUBCUTANEOUS EVERY 5 MIN PRN
OUTPATIENT
Start: 2024-11-15

## 2024-10-16 RX ORDER — KETOROLAC TROMETHAMINE 30 MG/ML
30 INJECTION, SOLUTION INTRAMUSCULAR; INTRAVENOUS ONCE
OUTPATIENT
Start: 2024-11-15 | End: 2024-11-15

## 2024-10-16 RX ORDER — ALBUTEROL SULFATE 0.83 MG/ML
3 SOLUTION RESPIRATORY (INHALATION) AS NEEDED
OUTPATIENT
Start: 2024-11-15

## 2024-10-16 RX ORDER — KETAMINE HCL IN NACL, ISO-OSM 100MG/10ML
SYRINGE (ML) INJECTION ONCE
OUTPATIENT
Start: 2024-11-15

## 2024-10-16 RX ORDER — ONDANSETRON HYDROCHLORIDE 2 MG/ML
4 INJECTION, SOLUTION INTRAVENOUS ONCE
OUTPATIENT
Start: 2024-11-15 | End: 2024-11-15

## 2024-10-16 RX ORDER — DIPHENHYDRAMINE HYDROCHLORIDE 50 MG/ML
50 INJECTION INTRAMUSCULAR; INTRAVENOUS AS NEEDED
OUTPATIENT
Start: 2024-11-15

## 2024-10-16 RX ORDER — KETAMINE HCL IN NACL, ISO-OSM 100MG/10ML
SYRINGE (ML) INJECTION ONCE
Status: COMPLETED | OUTPATIENT
Start: 2024-10-16 | End: 2024-10-16

## 2024-10-16 ASSESSMENT — PAIN SCALES - GENERAL
PAINLEVEL_OUTOF10: 3
PAINLEVEL_OUTOF10: 1

## 2024-10-16 ASSESSMENT — ENCOUNTER SYMPTOMS
DEPRESSION: 0
OCCASIONAL FEELINGS OF UNSTEADINESS: 0
LOSS OF SENSATION IN FEET: 0

## 2024-10-16 NOTE — PATIENT INSTRUCTIONS
Today :We administered ketamine 30 mg-lidocaine 300 mg, propofol, and ketorolac.     For:   1. Lumbar radicular pain    2. Neurogenic claudication    3. Post-COVID chronic cough         (Tell all doctors including dentists that you are taking this medication)     Go to the emergency room or call 911 if:  -You have signs of allergic reaction:   -Rash, hives, itching.   -Swollen, blistered, peeling skin.   -Swelling of face, lips, mouth, tongue or throat.   -Tightness of chest, trouble breathing, swallowing or talking     Call your doctor:  - If IV / injection site gets red, warm, swollen, itchy or leaks fluid or pus.     (Leave dressing on your IV site for at least 2 hours and keep area clean and dry  - If you get sick or have symptoms of infection or are not feeling well for any reason.    (Wash your hands often, stay away from people who are sick)  - If you have side effects from your medication that do not go away or are bothersome.     (Refer to the teaching your nurse gave you for side effects to call your doctor about)    - Common side effects may include:  stuffy nose, headache, feeling tired, muscle aches, upset stomach  - Before receiving any vaccines     - [facility] at   If:  - You get sick, are on antibiotics, have had a recent vaccine, have surgery or dental work and your doctor wants your visit rescheduled.  - You need to cancel and reschedule your visit for any reason. Call at least 2 days before your visit if you need to cancel.   - Your insurance changes before your next visit.    (We will need to get approval from your new insurance. This can take up to two weeks.)     [facility] is opened Monday thru Friday. We are closed on weekends and holidays.   Voice mail will take your call if the center is closed. If you leave a message please allow 24 hours for a call back during weekdays. If you leave a message on a weekend/holiday, we will call you back the next business  day.              [facility]      Pain Infusion Aftercare Instructions      1. It is normal to feel sedated, tired and low in energy after a pain infusion. DO NOT DRIVE, OPERATE ANY MACHINERY, OR MAKE ANY IMPORTANT DECISIONS FOR AT LEAST 24 HOURS AFTER THE INFUSION.     2. Call the pain center at [phone] with any problems, questions, or concerns.     3. Eat light after the infusion. If you feel queasy or sick to your stomach, laying down with your eyes closed may help. When you resume eating start with something mild like clear liquids, yogurt, applesauce, crackers, etc… Gradually advance to a regular diet.     4. Do not leave your house alone the evening of your pain infusion.     5. No alcohol or sedative medications, such as sleeping pills, for 24 hours after your pain infusion.     6. Resume all other prescribed medications unless directed otherwise by you physician.     7. If you have any medical emergencies, call 911 or go directly to the closest emergency room.

## 2024-10-16 NOTE — PROGRESS NOTES
S: Patient here for 16th opioid sparing pain infusion. Patient reports 65-70% reduction in pain after last infusion that lasted  almost a month.    Purpose of pain infusion meds explained along with potential side effects.  Patient verbalized understanding.    B: Pain Issues in right knee and toes.    A: Patient currently has pain described on flow sheet documentation. Designated  is Mic, at bedside. Patient last ate solid food >4 hours ago, and had liquid 1 hours ago.    R: Plan; Obtain IV access, do patient risk assessment, and start opioid sparing infusion as ordered. Monitoring for S/S of adverse reactions.    1555- Post infusion teaching provided. Patient verbalized understanding. VSS, Patient states pain is 1. Will assist patient to waiting car via wheelchair.

## 2024-10-22 ENCOUNTER — ANTICOAGULATION - WARFARIN VISIT (OUTPATIENT)
Dept: CARDIOLOGY | Facility: CLINIC | Age: 64
End: 2024-10-22
Payer: MEDICARE

## 2024-10-22 DIAGNOSIS — Z86.718 HISTORY OF DEEP VEIN THROMBOSIS: ICD-10-CM

## 2024-10-22 DIAGNOSIS — I73.9 PAD (PERIPHERAL ARTERY DISEASE) (CMS-HCC): ICD-10-CM

## 2024-10-22 DIAGNOSIS — I74.9 ARTERIAL EMBOLISM (MULTI): Primary | ICD-10-CM

## 2024-10-22 LAB
POC INR: 2.5 (ref 2–3)
POC PROTHROMBIN TIME: NORMAL

## 2024-10-22 PROCEDURE — 85610 PROTHROMBIN TIME: CPT | Mod: QW | Performed by: INTERNAL MEDICINE

## 2024-10-22 PROCEDURE — 99211 OFF/OP EST MAY X REQ PHY/QHP: CPT

## 2024-10-22 NOTE — PROGRESS NOTES
Patient identification verified with 2 identifiers.    Location: [facility] - [address] [place] [id number] [phone]     Referring Physician: Dr. Stephan Martins  Enrollment/ Re-enrollment date: 2025   INR Goal: 2.0-3.0  INR monitoring is per [facility] protocol.  Anticoagulation Medication: warfarin  Indication: Peripheral Artery Disease (PAD)    Subjective   Bleeding signs/symptoms: No.  Pt denies.    Bruising: No.  Pt denies.  Major bleeding event: No  Thrombosis signs/symptoms: No  Thromboembolic event: No  Missed doses: No.  Pt denies.  Extra doses: No  Medication changes: Yes.  Pt is back getting Ketamine infusions and is on Repatha.   Dietary changes: No.  Pt denies.  Change in health: No.  Pt denies.  Change in activity: No  Alcohol: No.  Pt denies.  Other concerns: No    Upcoming Procedures:  Does the Patient Have any upcoming procedures that require interruption in anticoagulation therapy? no  Does the patient require bridging? no    Dose maintained at last visit.  Pt is currently taking 24mg warfarin weekly.  Anticoagulation Summary  As of 10/22/2024      INR goal:  2.0-3.0   TTR:  65.9% (1 y)   INR used for dosin.50 (10/22/2024)   Weekly warfarin total:  24 mg               Assessment/Plan   Therapeutic     1. New dose: no change.  Maintain dose.   24mg weekly.  2. Next INR: 1 month.  Can r/s in 4 weeks if therapeutic.      Education provided to patient during the visit:  Patient instructed to call in interim with questions, concerns and changes.   Patient educated on interactions between medications and warfarin.   Patient educated on dietary consistency in vitamin k consumption.   Patient educated on affects of alcohol consumption while taking warfarin.   Patient educated on signs of bleeding/clotting.   Patient educated on compliance with dosing, follow up appointments, and prescribed plan of care.

## 2024-11-08 DIAGNOSIS — M54.16 LUMBAR RADICULAR PAIN: ICD-10-CM

## 2024-11-08 DIAGNOSIS — G89.4 CHRONIC PAIN SYNDROME: ICD-10-CM

## 2024-11-08 DIAGNOSIS — I73.9 PAD (PERIPHERAL ARTERY DISEASE) (CMS-HCC): ICD-10-CM

## 2024-11-08 RX ORDER — OXYCODONE HYDROCHLORIDE 5 MG/1
7.5 TABLET ORAL 2 TIMES DAILY PRN
Qty: 90 TABLET | Refills: 0 | Status: SHIPPED | OUTPATIENT
Start: 2024-11-09 | End: 2024-12-09

## 2024-11-11 DIAGNOSIS — I82.411 ACUTE EMBOLISM AND THROMBOSIS OF RIGHT FEMORAL VEIN (MULTI): ICD-10-CM

## 2024-11-12 DIAGNOSIS — R29.818 NEUROGENIC CLAUDICATION: ICD-10-CM

## 2024-11-12 DIAGNOSIS — U09.9 POST-COVID CHRONIC COUGH: ICD-10-CM

## 2024-11-12 DIAGNOSIS — M54.16 LUMBAR RADICULAR PAIN: Primary | ICD-10-CM

## 2024-11-12 DIAGNOSIS — R05.3 POST-COVID CHRONIC COUGH: ICD-10-CM

## 2024-11-12 RX ORDER — KETAMINE HCL IN NACL, ISO-OSM 100MG/10ML
SYRINGE (ML) INJECTION ONCE
Status: CANCELLED | OUTPATIENT
Start: 2024-11-14

## 2024-11-12 RX ORDER — KETOROLAC TROMETHAMINE 30 MG/ML
30 INJECTION, SOLUTION INTRAMUSCULAR; INTRAVENOUS ONCE
Status: CANCELLED | OUTPATIENT
Start: 2024-11-14 | End: 2024-11-14

## 2024-11-13 RX ORDER — WARFARIN 3 MG/1
3 TABLET ORAL NIGHTLY
Qty: 90 TABLET | Refills: 0 | Status: SHIPPED | OUTPATIENT
Start: 2024-11-13

## 2024-11-14 ENCOUNTER — INFUSION (OUTPATIENT)
Dept: INFUSION THERAPY | Facility: CLINIC | Age: 64
End: 2024-11-14
Payer: MEDICARE

## 2024-11-14 VITALS
HEART RATE: 77 BPM | TEMPERATURE: 97.5 F | OXYGEN SATURATION: 98 % | RESPIRATION RATE: 16 BRPM | DIASTOLIC BLOOD PRESSURE: 79 MMHG | SYSTOLIC BLOOD PRESSURE: 133 MMHG

## 2024-11-14 DIAGNOSIS — R29.818 NEUROGENIC CLAUDICATION: ICD-10-CM

## 2024-11-14 DIAGNOSIS — R05.3 POST-COVID CHRONIC COUGH: ICD-10-CM

## 2024-11-14 DIAGNOSIS — U09.9 POST-COVID CHRONIC COUGH: ICD-10-CM

## 2024-11-14 DIAGNOSIS — M54.16 LUMBAR RADICULAR PAIN: ICD-10-CM

## 2024-11-14 PROCEDURE — 96375 TX/PRO/DX INJ NEW DRUG ADDON: CPT | Mod: INF

## 2024-11-14 PROCEDURE — 96368 THER/DIAG CONCURRENT INF: CPT | Mod: INF

## 2024-11-14 PROCEDURE — 96365 THER/PROPH/DIAG IV INF INIT: CPT | Mod: INF

## 2024-11-14 PROCEDURE — 2500000004 HC RX 250 GENERAL PHARMACY W/ HCPCS (ALT 636 FOR OP/ED): Performed by: NURSE PRACTITIONER

## 2024-11-14 RX ORDER — DIPHENHYDRAMINE HYDROCHLORIDE 50 MG/ML
50 INJECTION INTRAMUSCULAR; INTRAVENOUS AS NEEDED
OUTPATIENT
Start: 2024-12-14

## 2024-11-14 RX ORDER — KETOROLAC TROMETHAMINE 30 MG/ML
30 INJECTION, SOLUTION INTRAMUSCULAR; INTRAVENOUS ONCE
Status: COMPLETED | OUTPATIENT
Start: 2024-11-14 | End: 2024-11-14

## 2024-11-14 RX ORDER — EPINEPHRINE 0.3 MG/.3ML
0.3 INJECTION SUBCUTANEOUS EVERY 5 MIN PRN
OUTPATIENT
Start: 2024-12-14

## 2024-11-14 RX ORDER — ALBUTEROL SULFATE 0.83 MG/ML
3 SOLUTION RESPIRATORY (INHALATION) AS NEEDED
OUTPATIENT
Start: 2024-12-14

## 2024-11-14 RX ORDER — KETOROLAC TROMETHAMINE 30 MG/ML
30 INJECTION, SOLUTION INTRAMUSCULAR; INTRAVENOUS ONCE
OUTPATIENT
Start: 2024-12-14 | End: 2024-12-14

## 2024-11-14 RX ORDER — NITROGLYCERIN 0.4 MG/1
0.4 TABLET SUBLINGUAL ONCE
OUTPATIENT
Start: 2024-12-14 | End: 2024-12-14

## 2024-11-14 RX ORDER — KETAMINE HCL IN NACL, ISO-OSM 100MG/10ML
SYRINGE (ML) INJECTION ONCE
OUTPATIENT
Start: 2024-12-14

## 2024-11-14 RX ORDER — FAMOTIDINE 10 MG/ML
20 INJECTION INTRAVENOUS ONCE AS NEEDED
OUTPATIENT
Start: 2024-12-14

## 2024-11-14 RX ORDER — KETAMINE HCL IN NACL, ISO-OSM 100MG/10ML
SYRINGE (ML) INJECTION ONCE
Status: COMPLETED | OUTPATIENT
Start: 2024-11-14 | End: 2024-11-14

## 2024-11-14 RX ORDER — ONDANSETRON HYDROCHLORIDE 2 MG/ML
4 INJECTION, SOLUTION INTRAVENOUS ONCE
OUTPATIENT
Start: 2024-12-14 | End: 2024-12-14

## 2024-11-14 ASSESSMENT — ENCOUNTER SYMPTOMS
DEPRESSION: 0
OCCASIONAL FEELINGS OF UNSTEADINESS: 1
LOSS OF SENSATION IN FEET: 0

## 2024-11-14 ASSESSMENT — PAIN SCALES - GENERAL: PAINLEVEL_OUTOF10: 6

## 2024-11-14 NOTE — PATIENT INSTRUCTIONS
Today :We administered ketamine 30 mg-lidocaine 300 mg, propofol, and ketorolac.     For:   1. Lumbar radicular pain    2. Neurogenic claudication    3. Post-COVID chronic cough          (Tell all doctors including dentists that you are taking this medication)     Go to the emergency room or call 911 if:  -You have signs of allergic reaction:   -Rash, hives, itching.   -Swollen, blistered, peeling skin.   -Swelling of face, lips, mouth, tongue or throat.   -Tightness of chest, trouble breathing, swallowing or talking     Call your doctor:  - If IV / injection site gets red, warm, swollen, itchy or leaks fluid or pus.     (Leave dressing on your IV site for at least 2 hours and keep area clean and dry  - If you get sick or have symptoms of infection or are not feeling well for any reason.    (Wash your hands often, stay away from people who are sick)  - If you have side effects from your medication that do not go away or are bothersome.     (Refer to the teaching your nurse gave you for side effects to call your doctor about)    - Common side effects may include:  stuffy nose, headache, feeling tired, muscle aches, upset stomach  - Before receiving any vaccines     - Call [facility] at   If:  - You get sick, are on antibiotics, have had a recent vaccine, have surgery or dental work and your doctor wants your visit rescheduled.  - You need to cancel and reschedule your visit for any reason. Call at least 2 days before your visit if you need to cancel.   - Your insurance changes before your next visit.    (We will need to get approval from your new insurance. This can take up to two weeks.)     [facility] is opened Monday thru Friday. We are closed on weekends and holidays.   Voice mail will take your call if the center is closed. If you leave a message please allow 24 hours for a call back during weekdays. If you leave a message on a weekend/holiday, we will call you back the next  business day.    A pharmacist is available Monday - Friday from 8:30AM to 3:30PM to help answer any questions you may have about your prescriptions(s). Please call pharmacy at:    [facility]: [phone]  [facility]: [phone]  [facility]: [phone]              [facility]      Pain Infusion Aftercare Instructions      1. It is normal to feel sedated, tired and low in energy after a pain infusion. DO NOT DRIVE, OPERATE ANY MACHINERY, OR MAKE ANY IMPORTANT DECISIONS FOR AT LEAST 24 HOURS AFTER THE INFUSION.     2. Call the pain center at [phone] with any problems, questions, or concerns.     3. Eat light after the infusion. If you feel queasy or sick to your stomach, laying down with your eyes closed may help. When you resume eating start with something mild like clear liquids, yogurt, applesauce, crackers, etc… Gradually advance to a regular diet.     4. Do not leave your house alone the evening of your pain infusion.     5. No alcohol or sedative medications, such as sleeping pills, for 24 hours after your pain infusion.     6. Resume all other prescribed medications unless directed otherwise by you physician.     7. If you have any medical emergencies, call 911 or go directly to the closest emergency room.

## 2024-11-14 NOTE — PROGRESS NOTES
S: Patient here for 17th opioid sparing pain infusion. Patient reports 65-70% reduction in pain after last infusion that lasted 3 weeks.    Purpose of pain infusion meds explained along with potential side effects.  Patient verbalized understanding.    B: Pain Issues in right knee to toes   Is Patient breast feeding: no    A: Patient currently has pain described on flow sheet documentation. Designated  is Lonnie Martins,  at [phone]. Patient last ate solid food >4 hours ago, and had liquid 1 hours ago.    R: Plan; Obtain IV access, do patient risk assessment, and start opioid sparing infusion as ordered. Monitoring for S/S of adverse reactions.    1220- Post infusion teaching provided. Patient verbalized understanding. VSS, Patient states pain is 1. Will assist patient to waiting car via wheelchair.

## 2024-11-19 ENCOUNTER — ANTICOAGULATION - WARFARIN VISIT (OUTPATIENT)
Dept: CARDIOLOGY | Facility: CLINIC | Age: 64
End: 2024-11-19
Payer: MEDICARE

## 2024-11-19 DIAGNOSIS — Z86.718 HISTORY OF DEEP VEIN THROMBOSIS: ICD-10-CM

## 2024-11-19 DIAGNOSIS — I74.9 ARTERIAL EMBOLISM (MULTI): Primary | ICD-10-CM

## 2024-11-19 DIAGNOSIS — I73.9 PAD (PERIPHERAL ARTERY DISEASE) (CMS-HCC): ICD-10-CM

## 2024-11-19 LAB
POC INR: 2.2 (ref 2–3)
POC PROTHROMBIN TIME: NORMAL

## 2024-11-19 PROCEDURE — 85610 PROTHROMBIN TIME: CPT | Mod: QW | Performed by: INTERNAL MEDICINE

## 2024-11-19 PROCEDURE — 99211 OFF/OP EST MAY X REQ PHY/QHP: CPT

## 2024-11-19 NOTE — PROGRESS NOTES
Patient identification verified with 2 identifiers.    Location: [facility] - [address] [place] [id number] [phone]     Referring Physician: Dr. Stephan Martins  Enrollment/ Re-enrollment date: 2025   INR Goal: 2.0-3.0  INR monitoring is per [facility] protocol.  Anticoagulation Medication: warfarin  Indication: Peripheral Artery Disease (PAD)    Subjective   Bleeding signs/symptoms: No.  Pt denies.    Bruising: No.  Pt denies.  Major bleeding event: No  Thrombosis signs/symptoms: No  Thromboembolic event: No  Missed doses: No.  Pt denies.  Extra doses: No  Medication changes: No.  Pt is back getting Ketamine infusions and is on Repatha.   Dietary changes: No.  Pt denies.  Change in health: No.  Pt denies.  Change in activity: No  Alcohol: No.  Pt denies.  Other concerns: No    Upcoming Procedures:  Does the Patient Have any upcoming procedures that require interruption in anticoagulation therapy? no  Does the patient require bridging? no    Dose maintained at last visit.  Pt is currently taking 24mg of warfarin weekly.  Anticoagulation Summary  As of 2024      INR goal:  2.0-3.0   TTR:  68.3% (1.1 y)   INR used for dosin.20 (2024)   Weekly warfarin total:  24 mg               Assessment/Plan   Therapeutic     1. New dose: no change.  Maintain dose.   24mg weekly.  2. Next INR: 1 month.  Can r/s in 4 weeks if therapeutic.      Education provided to patient during the visit:  Patient instructed to call in interim with questions, concerns and changes.   Patient educated on interactions between medications and warfarin.   Patient educated on dietary consistency in vitamin k consumption.   Patient educated on affects of alcohol consumption while taking warfarin.   Patient educated on signs of bleeding/clotting.   Patient educated on compliance with dosing, follow up appointments, and prescribed plan of care.

## 2024-12-03 ENCOUNTER — OFFICE VISIT (OUTPATIENT)
Dept: PAIN MEDICINE | Facility: CLINIC | Age: 64
End: 2024-12-03
Payer: MEDICARE

## 2024-12-03 VITALS
TEMPERATURE: 97.7 F | RESPIRATION RATE: 18 BRPM | SYSTOLIC BLOOD PRESSURE: 131 MMHG | BODY MASS INDEX: 28.34 KG/M2 | WEIGHT: 166 LBS | HEIGHT: 64 IN | HEART RATE: 72 BPM | DIASTOLIC BLOOD PRESSURE: 89 MMHG | OXYGEN SATURATION: 98 %

## 2024-12-03 DIAGNOSIS — M54.16 LUMBAR RADICULAR PAIN: ICD-10-CM

## 2024-12-03 DIAGNOSIS — M54.31 SCIATICA OF RIGHT SIDE: ICD-10-CM

## 2024-12-03 DIAGNOSIS — G89.4 CHRONIC PAIN SYNDROME: ICD-10-CM

## 2024-12-03 DIAGNOSIS — I73.9 PAD (PERIPHERAL ARTERY DISEASE) (CMS-HCC): ICD-10-CM

## 2024-12-03 PROCEDURE — 1036F TOBACCO NON-USER: CPT | Performed by: ANESTHESIOLOGY

## 2024-12-03 PROCEDURE — 3075F SYST BP GE 130 - 139MM HG: CPT | Performed by: ANESTHESIOLOGY

## 2024-12-03 PROCEDURE — 99213 OFFICE O/P EST LOW 20 MIN: CPT | Performed by: ANESTHESIOLOGY

## 2024-12-03 PROCEDURE — 3079F DIAST BP 80-89 MM HG: CPT | Performed by: ANESTHESIOLOGY

## 2024-12-03 PROCEDURE — 3008F BODY MASS INDEX DOCD: CPT | Performed by: ANESTHESIOLOGY

## 2024-12-03 RX ORDER — OXYCODONE HYDROCHLORIDE 5 MG/1
7.5 TABLET ORAL 2 TIMES DAILY PRN
Qty: 90 TABLET | Refills: 0 | Status: SHIPPED | OUTPATIENT
Start: 2024-12-09 | End: 2025-01-08

## 2024-12-03 RX ORDER — GABAPENTIN 600 MG/1
600 TABLET ORAL 3 TIMES DAILY
Qty: 90 TABLET | Refills: 3 | Status: SHIPPED | OUTPATIENT
Start: 2024-12-03 | End: 2025-04-02

## 2024-12-03 ASSESSMENT — PAIN SCALES - GENERAL
PAINLEVEL_OUTOF10: 2
PAINLEVEL_OUTOF10: 2

## 2024-12-03 ASSESSMENT — ENCOUNTER SYMPTOMS
FEVER: 0
ADENOPATHY: 0
OCCASIONAL FEELINGS OF UNSTEADINESS: 0
DEPRESSION: 0
CONSTIPATION: 0
LOSS OF SENSATION IN FEET: 0
BLOOD IN STOOL: 0
SHORTNESS OF BREATH: 0

## 2024-12-03 ASSESSMENT — PAIN - FUNCTIONAL ASSESSMENT: PAIN_FUNCTIONAL_ASSESSMENT: 0-10

## 2024-12-03 NOTE — PROGRESS NOTES
Chief Complain  Pain and Follow-up     History Of Present Illness  Angle Martins is a 64 y.o. female here for right leg pain . The patient rates the pain at 3  on a scale from 0-10.  The patient describes pain as aching, radiating, burning.  The pain is worsened by bending forward and walking and is alleviated by medications aspirin and prescribed pain medications.  Since the last visit the pain has worsened.  The patient denies any fever, chills, weight loss, bladder/bowel incontinence.       Past Medical History  She has a past medical history of COVID-19 and Personal history of other venous thrombosis and embolism.    Surgical History  She has a past surgical history that includes Other surgical history (07/08/2022); Other surgical history (07/08/2022); Other surgical history (07/08/2022); CT angio aorta and bilateral iliofemoral runoff including without contrast if performed (12/8/2022); and CT angio aorta and bilateral iliofemoral runoff including without contrast if performed (12/13/2022).     Social History  She reports that she quit smoking about 36 years ago. Her smoking use included cigarettes. She has never used smokeless tobacco. She reports current alcohol use. She reports that she does not use drugs.    Family History  No family history on file.     Allergies  Promethazine and Phenothiazines    Review of Systems  Review of Systems   Constitutional:  Negative for fever.   Respiratory:  Negative for shortness of breath.    Cardiovascular:  Negative for chest pain.   Gastrointestinal:  Negative for blood in stool and constipation.   Skin:  Negative for rash.   Hematological:  Negative for adenopathy.   Psychiatric/Behavioral:  Negative for suicidal ideas.         Physical Exam  Physical Exam  Constitutional:       Appearance: Normal appearance.   HENT:      Head: Normocephalic and atraumatic.   Eyes:      Extraocular Movements: Extraocular movements intact.      Pupils: Pupils are equal, round, and  "reactive to light.   Pulmonary:      Effort: Pulmonary effort is normal.   Neurological:      Mental Status: She is alert and oriented to person, place, and time.   Psychiatric:         Mood and Affect: Mood normal.         Last Recorded Vitals  Blood pressure 131/89, pulse 72, temperature 36.5 °C (97.7 °F), resp. rate 18, height 1.626 m (5' 4\"), weight 75.3 kg (166 lb), SpO2 98%.       Assessment/Plan     Angle Martins is a 64 y.o. female here for follow-up of chronic right lower extremity pain.  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  She continues to have pain in the right lower extremity since then.  She has been on chronic opiate therapy with Norco 10 mg twice daily with significant improvement in her pain.  However due to her liver infarction, she was recommended to stop Tylenol as a result we switched her to oxycodone.  She is taking 7.5 mg twice daily which does provide her with 60% relief of pain.  Patient she did not get ketamine infusions with significant benefit.  Her pain has been flared up due to her missing couple of infusions in the past.  She is back up to speed and has infusion schedule later this month.  Denies any significant side effects.  No new neurological, consistent symptoms.  Refill for oxycodone was provided.  I have personally reviewed the OARRS report.  I have considered the risks of abuse, dependence, addiction and diversion.        Jose Armando Velazquez MD  "

## 2024-12-03 NOTE — PROGRESS NOTES
Pt is complaining of pain in her right knee to her toes. Pt states she has cramping in her foot. Pt states that her pain level is a 2/10 on the pain scale. Pt has been getting ketamine infusions and states overall that they have been helping. Pt states that she just started getting back to the infusions due to being in the hospital. Pt has missed 4-5 months of infusions.

## 2024-12-10 DIAGNOSIS — M54.16 LUMBAR RADICULAR PAIN: Primary | ICD-10-CM

## 2024-12-10 DIAGNOSIS — R29.818 NEUROGENIC CLAUDICATION: ICD-10-CM

## 2024-12-10 DIAGNOSIS — R05.3 POST-COVID CHRONIC COUGH: ICD-10-CM

## 2024-12-10 DIAGNOSIS — U09.9 POST-COVID CHRONIC COUGH: ICD-10-CM

## 2024-12-10 RX ORDER — KETOROLAC TROMETHAMINE 30 MG/ML
30 INJECTION, SOLUTION INTRAMUSCULAR; INTRAVENOUS ONCE
Status: CANCELLED | OUTPATIENT
Start: 2024-12-12 | End: 2024-12-12

## 2024-12-10 RX ORDER — KETAMINE HCL IN NACL, ISO-OSM 100MG/10ML
SYRINGE (ML) INJECTION ONCE
Status: CANCELLED | OUTPATIENT
Start: 2024-12-12

## 2024-12-12 ENCOUNTER — INFUSION (OUTPATIENT)
Dept: INFUSION THERAPY | Facility: CLINIC | Age: 64
End: 2024-12-12
Payer: MEDICARE

## 2024-12-12 VITALS
SYSTOLIC BLOOD PRESSURE: 139 MMHG | RESPIRATION RATE: 12 BRPM | OXYGEN SATURATION: 99 % | TEMPERATURE: 98.7 F | DIASTOLIC BLOOD PRESSURE: 70 MMHG | HEART RATE: 84 BPM

## 2024-12-12 DIAGNOSIS — R29.818 NEUROGENIC CLAUDICATION: ICD-10-CM

## 2024-12-12 DIAGNOSIS — R05.3 POST-COVID CHRONIC COUGH: ICD-10-CM

## 2024-12-12 DIAGNOSIS — M54.16 LUMBAR RADICULAR PAIN: ICD-10-CM

## 2024-12-12 DIAGNOSIS — U09.9 POST-COVID CHRONIC COUGH: ICD-10-CM

## 2024-12-12 PROCEDURE — 96375 TX/PRO/DX INJ NEW DRUG ADDON: CPT | Mod: INF

## 2024-12-12 PROCEDURE — 2500000004 HC RX 250 GENERAL PHARMACY W/ HCPCS (ALT 636 FOR OP/ED): Performed by: NURSE PRACTITIONER

## 2024-12-12 PROCEDURE — 96365 THER/PROPH/DIAG IV INF INIT: CPT | Mod: INF

## 2024-12-12 PROCEDURE — 96368 THER/DIAG CONCURRENT INF: CPT | Mod: INF

## 2024-12-12 RX ORDER — KETAMINE HCL IN NACL, ISO-OSM 100MG/10ML
SYRINGE (ML) INJECTION ONCE
Status: COMPLETED | OUTPATIENT
Start: 2024-12-12 | End: 2024-12-12

## 2024-12-12 RX ORDER — NITROGLYCERIN 0.4 MG/1
0.4 TABLET SUBLINGUAL ONCE
OUTPATIENT
Start: 2025-01-11 | End: 2025-01-11

## 2024-12-12 RX ORDER — KETOROLAC TROMETHAMINE 30 MG/ML
30 INJECTION, SOLUTION INTRAMUSCULAR; INTRAVENOUS ONCE
Status: CANCELLED | OUTPATIENT
Start: 2025-01-11 | End: 2025-01-11

## 2024-12-12 RX ORDER — KETOROLAC TROMETHAMINE 30 MG/ML
30 INJECTION, SOLUTION INTRAMUSCULAR; INTRAVENOUS ONCE
Status: COMPLETED | OUTPATIENT
Start: 2024-12-12 | End: 2024-12-12

## 2024-12-12 RX ORDER — DIPHENHYDRAMINE HYDROCHLORIDE 50 MG/ML
50 INJECTION INTRAMUSCULAR; INTRAVENOUS AS NEEDED
OUTPATIENT
Start: 2025-01-11

## 2024-12-12 RX ORDER — FAMOTIDINE 10 MG/ML
20 INJECTION INTRAVENOUS ONCE AS NEEDED
OUTPATIENT
Start: 2025-01-11

## 2024-12-12 RX ORDER — KETAMINE HCL IN NACL, ISO-OSM 100MG/10ML
SYRINGE (ML) INJECTION ONCE
Status: CANCELLED | OUTPATIENT
Start: 2025-01-11

## 2024-12-12 RX ORDER — ONDANSETRON HYDROCHLORIDE 2 MG/ML
4 INJECTION, SOLUTION INTRAVENOUS ONCE
OUTPATIENT
Start: 2025-01-11 | End: 2025-01-11

## 2024-12-12 RX ORDER — ALBUTEROL SULFATE 0.83 MG/ML
3 SOLUTION RESPIRATORY (INHALATION) AS NEEDED
OUTPATIENT
Start: 2025-01-11

## 2024-12-12 RX ORDER — EPINEPHRINE 0.3 MG/.3ML
0.3 INJECTION SUBCUTANEOUS EVERY 5 MIN PRN
OUTPATIENT
Start: 2025-01-11

## 2024-12-12 ASSESSMENT — ENCOUNTER SYMPTOMS
DEPRESSION: 0
LOSS OF SENSATION IN FEET: 0
OCCASIONAL FEELINGS OF UNSTEADINESS: 0

## 2024-12-12 ASSESSMENT — PAIN - FUNCTIONAL ASSESSMENT: PAIN_FUNCTIONAL_ASSESSMENT: 0-10

## 2024-12-12 ASSESSMENT — PAIN SCALES - GENERAL: PAINLEVEL_OUTOF10: 2

## 2024-12-12 NOTE — PATIENT INSTRUCTIONS
Today :We administered ketamine 30 mg-lidocaine 300 mg, propofol, and ketorolac.     For:   1. Lumbar radicular pain    2. Neurogenic claudication    3. Post-COVID chronic cough         Your next appointment is due in:  2 weeks        Please read the  Medication Guide that was given to you and reviewed during todays visit.     (Tell all doctors including dentists that you are taking this medication)     Go to the emergency room or call 911 if:  -You have signs of allergic reaction:   -Rash, hives, itching.   -Swollen, blistered, peeling skin.   -Swelling of face, lips, mouth, tongue or throat.   -Tightness of chest, trouble breathing, swallowing or talking     Call your doctor:  - If IV / injection site gets red, warm, swollen, itchy or leaks fluid or pus.     (Leave dressing on your IV site for at least 2 hours and keep area clean and dry  - If you get sick or have symptoms of infection or are not feeling well for any reason.    (Wash your hands often, stay away from people who are sick)  - If you have side effects from your medication that do not go away or are bothersome.     (Refer to the teaching your nurse gave you for side effects to call your doctor about)    - Common side effects may include:  stuffy nose, headache, feeling tired, muscle aches, upset stomach  - Before receiving any vaccines     - [facility] at   If:  - You get sick, are on antibiotics, have had a recent vaccine, have surgery or dental work and your doctor wants your visit rescheduled.  - You need to cancel and reschedule your visit for any reason. Call at least 2 days before your visit if you need to cancel.   - Your insurance changes before your next visit.    (We will need to get approval from your new insurance. This can take up to two weeks.)     [facility] is opened Monday thru Friday. We are closed on weekends and holidays.   Voice mail will take your call if the center is closed. If you  leave a message please allow 24 hours for a call back during weekdays. If you leave a message on a weekend/holiday, we will call you back the next business day.    A pharmacist is available Monday - Friday from 8:30AM to 3:30PM to help answer any questions you may have about your prescriptions(s). Please call pharmacy at:    [facility]: [phone]  [facility]: [phone]  [facility]: [phone]              [facility]      Pain Infusion Aftercare Instructions      1. It is normal to feel sedated, tired and low in energy after a pain infusion. DO NOT DRIVE, OPERATE ANY MACHINERY, OR MAKE ANY IMPORTANT DECISIONS FOR AT LEAST 24 HOURS AFTER THE INFUSION.     2. Call the pain center at [phone] with any problems, questions, or concerns.     3. Eat light after the infusion. If you feel queasy or sick to your stomach, laying down with your eyes closed may help. When you resume eating start with something mild like clear liquids, yogurt, applesauce, crackers, etc… Gradually advance to a regular diet.     4. Do not leave your house alone the evening of your pain infusion.     5. No alcohol or sedative medications, such as sleeping pills, for 24 hours after your pain infusion.     6. Resume all other prescribed medications unless directed otherwise by you physician.     7. If you have any medical emergencies, call 911 or go directly to the closest emergency room.

## 2024-12-12 NOTE — PROGRESS NOTES
S: Patient here for #18 opioid sparing pain infusion. Patient reports 65% reduction in pain after last infusion that lasted 3 weeks.    Purpose of pain infusion meds explained along with potential side effects.  Patient verbalized understanding.    B: Pain Issues.     A: Patient currently has pain described on flow sheet documentation. Designated  is . Patient last ate solid food 12 hours ago, and had liquid 4 hours ago.    R: Plan; Obtain IV access, do patient risk assessment, and start opioid sparing infusion as ordered. Monitoring for S/S of adverse reactions.

## 2024-12-17 ENCOUNTER — ANTICOAGULATION - WARFARIN VISIT (OUTPATIENT)
Dept: CARDIOLOGY | Facility: CLINIC | Age: 64
End: 2024-12-17
Payer: MEDICARE

## 2024-12-17 DIAGNOSIS — I74.9 ARTERIAL EMBOLISM (MULTI): Primary | ICD-10-CM

## 2024-12-17 DIAGNOSIS — Z86.718 HISTORY OF DEEP VEIN THROMBOSIS: ICD-10-CM

## 2024-12-17 DIAGNOSIS — I73.9 PAD (PERIPHERAL ARTERY DISEASE) (CMS-HCC): ICD-10-CM

## 2024-12-17 LAB
POC INR: 1.9 (ref 2–3)
POC PROTHROMBIN TIME: ABNORMAL

## 2024-12-17 PROCEDURE — 85610 PROTHROMBIN TIME: CPT | Mod: QW | Performed by: INTERNAL MEDICINE

## 2024-12-17 PROCEDURE — 99211 OFF/OP EST MAY X REQ PHY/QHP: CPT

## 2024-12-17 NOTE — PROGRESS NOTES
Patient identification verified with 2 identifiers.    Location: [facility] - [address] [place] [id number] [phone]     Referring Physician: Dr. Stephan Martins  Enrollment/ Re-enrollment date: 2025   INR Goal: 2.0-3.0  INR monitoring is per [facility] protocol.  Anticoagulation Medication: warfarin  Indication: Peripheral Artery Disease (PAD)    Subjective   Bleeding signs/symptoms: No.  Pt denies.    Bruising: No.  Pt denies.  Major bleeding event: No  Thrombosis signs/symptoms: No  Thromboembolic event: No  Missed doses: No.  Pt denies.  Extra doses: No  Medication changes: No.  Pt is back getting Ketamine infusions and is on Repatha.   Dietary changes: No.  Pt denies.  Change in health: No.  Pt denies.  Change in activity: No.  Pt denies.  Alcohol: No.  Pt denies.  Other concerns: No    Upcoming Procedures:  Does the Patient Have any upcoming procedures that require interruption in anticoagulation therapy? no  Does the patient require bridging? no    Dose maintained at last visit.  Pt is currently taking 24mg of warfarin weekly.  Anticoagulation Summary  As of 2024      INR goal:  2.0-3.0   TTR:  68.2% (1.2 y)   INR used for dosin.90 (2024)   Weekly warfarin total:  24 mg               Assessment/Plan   Subtherapeutic     1. New dose: no change.  Maintain dose but will give Pt a One time extra dose of 1.5mg (4.5mg instead of 3mg on 24).   24mg weekly.  2. Next INR: 1 week.  Pt r/s to RTC in 2 weeks instead of 1 week recommended per protocol due to Pt availability around week of [place].  Can r/s in 4 weeks if therapeutic.      Education provided to patient during the visit:  Patient instructed to call in interim with questions, concerns and changes.   Patient educated on interactions between medications and warfarin.   Patient educated on dietary consistency in vitamin k consumption.   Patient educated on affects of alcohol consumption while taking  warfarin.   Patient educated on signs of bleeding/clotting.   Patient educated on compliance with dosing, follow up appointments, and prescribed plan of care.

## 2024-12-30 ENCOUNTER — APPOINTMENT (OUTPATIENT)
Dept: PRIMARY CARE | Facility: CLINIC | Age: 64
End: 2024-12-30
Payer: MEDICARE

## 2025-01-02 ENCOUNTER — ANTICOAGULATION - WARFARIN VISIT (OUTPATIENT)
Dept: CARDIOLOGY | Facility: CLINIC | Age: 65
End: 2025-01-02
Payer: MEDICARE

## 2025-01-02 ENCOUNTER — LAB (OUTPATIENT)
Dept: LAB | Facility: LAB | Age: 65
End: 2025-01-02
Payer: MEDICARE

## 2025-01-02 DIAGNOSIS — Z86.718 HISTORY OF DEEP VEIN THROMBOSIS: ICD-10-CM

## 2025-01-02 DIAGNOSIS — E78.5 HYPERLIPIDEMIA, UNSPECIFIED HYPERLIPIDEMIA TYPE: ICD-10-CM

## 2025-01-02 DIAGNOSIS — E78.00 PURE HYPERCHOLESTEROLEMIA: ICD-10-CM

## 2025-01-02 DIAGNOSIS — I74.9 ARTERIAL EMBOLISM (MULTI): Primary | ICD-10-CM

## 2025-01-02 DIAGNOSIS — I73.9 PAD (PERIPHERAL ARTERY DISEASE) (CMS-HCC): ICD-10-CM

## 2025-01-02 LAB
POC INR: 2.3 (ref 2–3)
POC PROTHROMBIN TIME: NORMAL

## 2025-01-02 PROCEDURE — 80061 LIPID PANEL: CPT

## 2025-01-02 PROCEDURE — 99211 OFF/OP EST MAY X REQ PHY/QHP: CPT

## 2025-01-02 PROCEDURE — 82172 ASSAY OF APOLIPOPROTEIN: CPT

## 2025-01-02 PROCEDURE — 36415 COLL VENOUS BLD VENIPUNCTURE: CPT

## 2025-01-02 PROCEDURE — 80076 HEPATIC FUNCTION PANEL: CPT

## 2025-01-02 PROCEDURE — 85610 PROTHROMBIN TIME: CPT | Mod: QW

## 2025-01-02 NOTE — PROGRESS NOTES
Patient identification verified with 2 identifiers.    Location: [facility] - [address] [place] [address] [phone]     Referring Physician: Dr. Stephan Martins  Enrollment/ Re-enrollment date: 2025   INR Goal: 2.0-3.0  INR monitoring is per [facility] protocol.  Anticoagulation Medication: warfarin  Indication: Peripheral Artery Disease (PAD)    Subjective   Bleeding signs/symptoms: No.  Pt denies.    Bruising: No.  Pt denies.  Major bleeding event: No  Thrombosis signs/symptoms: No  Thromboembolic event: No  Missed doses: No.  Pt denies.  Extra doses: Yes.  Pt took One time extra dose of 1.5mg on 24 as instructed.  Medication changes: No.  Pt is back getting Ketamine infusions and is on Repatha.   Dietary changes: No.  Pt denies.  Change in health: No.  Pt denies.  Change in activity: No.  Pt denies.  Alcohol: No.  Pt denies.  Other concerns: No    Upcoming Procedures:  Does the Patient Have any upcoming procedures that require interruption in anticoagulation therapy? no  Does the patient require bridging? no    Dose maintained at last visit but a One time extra dose of 1.5mg was given on 24 (4.5mg instead of 3mg).  Pt is currently taking 24mg of warfarin weekly.  Anticoagulation Summary  As of 2025      INR goal:  2.0-3.0   TTR:  68.5% (1.2 y)   INR used for dosin.30 (2025)   Weekly warfarin total:  24 mg               Assessment/Plan   Therapeutic     1. New dose: no change.  Maintain dose but will give Pt a One time extra dose of 1.5mg (4.5mg instead of 3mg on 24).   24mg weekly.  2. Next INR: 1 month.   Can r/s in 4 weeks if therapeutic.      Education provided to patient during the visit:  Patient instructed to call in interim with questions, concerns and changes.   Patient educated on interactions between medications and warfarin.   Patient educated on dietary consistency in vitamin k consumption.   Patient educated on affects of alcohol  consumption while taking warfarin.   Patient educated on signs of bleeding/clotting.   Patient educated on compliance with dosing, follow up appointments, and prescribed plan of care.

## 2025-01-03 LAB
ALBUMIN SERPL BCP-MCNC: 4.7 G/DL (ref 3.4–5)
ALP SERPL-CCNC: 87 U/L (ref 33–136)
ALT SERPL W P-5'-P-CCNC: 19 U/L (ref 7–45)
AST SERPL W P-5'-P-CCNC: 17 U/L (ref 9–39)
BILIRUB DIRECT SERPL-MCNC: 0.1 MG/DL (ref 0–0.3)
BILIRUB SERPL-MCNC: 0.5 MG/DL (ref 0–1.2)
CHOLEST SERPL-MCNC: 202 MG/DL (ref 0–199)
CHOLESTEROL/HDL RATIO: 2.6
HDLC SERPL-MCNC: 77.9 MG/DL
LDLC SERPL CALC-MCNC: 110 MG/DL
NON HDL CHOLESTEROL: 124 MG/DL (ref 0–149)
PROT SERPL-MCNC: 7.2 G/DL (ref 6.4–8.2)
TRIGL SERPL-MCNC: 70 MG/DL (ref 0–149)
VLDL: 14 MG/DL (ref 0–40)

## 2025-01-04 LAB — LPA SERPL-MCNC: 60 MG/DL

## 2025-01-06 DIAGNOSIS — I73.9 PAD (PERIPHERAL ARTERY DISEASE) (CMS-HCC): ICD-10-CM

## 2025-01-06 DIAGNOSIS — G89.4 CHRONIC PAIN SYNDROME: ICD-10-CM

## 2025-01-06 DIAGNOSIS — M54.16 LUMBAR RADICULAR PAIN: ICD-10-CM

## 2025-01-06 RX ORDER — OXYCODONE HYDROCHLORIDE 5 MG/1
7.5 TABLET ORAL 2 TIMES DAILY PRN
Qty: 90 TABLET | Refills: 0 | Status: SHIPPED | OUTPATIENT
Start: 2025-01-08 | End: 2025-02-07

## 2025-01-08 DIAGNOSIS — U09.9 POST-COVID CHRONIC COUGH: ICD-10-CM

## 2025-01-08 DIAGNOSIS — M54.16 LUMBAR RADICULAR PAIN: Primary | ICD-10-CM

## 2025-01-08 DIAGNOSIS — R05.3 POST-COVID CHRONIC COUGH: ICD-10-CM

## 2025-01-08 DIAGNOSIS — R29.818 NEUROGENIC CLAUDICATION: ICD-10-CM

## 2025-01-08 RX ORDER — KETOROLAC TROMETHAMINE 30 MG/ML
30 INJECTION, SOLUTION INTRAMUSCULAR; INTRAVENOUS ONCE
Status: CANCELLED | OUTPATIENT
Start: 2025-01-09 | End: 2025-01-09

## 2025-01-08 RX ORDER — KETAMINE HCL IN NACL, ISO-OSM 100MG/10ML
SYRINGE (ML) INJECTION ONCE
Status: CANCELLED | OUTPATIENT
Start: 2025-01-09

## 2025-01-09 ENCOUNTER — INFUSION (OUTPATIENT)
Dept: INFUSION THERAPY | Facility: CLINIC | Age: 65
End: 2025-01-09
Payer: MEDICARE

## 2025-01-09 ENCOUNTER — PATIENT MESSAGE (OUTPATIENT)
Dept: CARDIOLOGY | Facility: CLINIC | Age: 65
End: 2025-01-09

## 2025-01-09 VITALS
DIASTOLIC BLOOD PRESSURE: 76 MMHG | TEMPERATURE: 96.6 F | RESPIRATION RATE: 18 BRPM | SYSTOLIC BLOOD PRESSURE: 149 MMHG | HEART RATE: 73 BPM | OXYGEN SATURATION: 99 %

## 2025-01-09 DIAGNOSIS — R05.3 POST-COVID CHRONIC COUGH: ICD-10-CM

## 2025-01-09 DIAGNOSIS — M54.16 LUMBAR RADICULAR PAIN: ICD-10-CM

## 2025-01-09 DIAGNOSIS — R29.818 NEUROGENIC CLAUDICATION: ICD-10-CM

## 2025-01-09 DIAGNOSIS — U09.9 POST-COVID CHRONIC COUGH: ICD-10-CM

## 2025-01-09 PROCEDURE — 2500000004 HC RX 250 GENERAL PHARMACY W/ HCPCS (ALT 636 FOR OP/ED): Performed by: NURSE PRACTITIONER

## 2025-01-09 PROCEDURE — 96365 THER/PROPH/DIAG IV INF INIT: CPT | Mod: INF

## 2025-01-09 PROCEDURE — 96368 THER/DIAG CONCURRENT INF: CPT | Mod: INF

## 2025-01-09 PROCEDURE — 96375 TX/PRO/DX INJ NEW DRUG ADDON: CPT | Mod: INF

## 2025-01-09 RX ORDER — NITROGLYCERIN 0.4 MG/1
0.4 TABLET SUBLINGUAL ONCE
OUTPATIENT
Start: 2025-02-08 | End: 2025-02-08

## 2025-01-09 RX ORDER — EPINEPHRINE 0.3 MG/.3ML
0.3 INJECTION SUBCUTANEOUS EVERY 5 MIN PRN
OUTPATIENT
Start: 2025-02-08

## 2025-01-09 RX ORDER — KETAMINE HCL IN NACL, ISO-OSM 100MG/10ML
SYRINGE (ML) INJECTION ONCE
OUTPATIENT
Start: 2025-02-08

## 2025-01-09 RX ORDER — KETOROLAC TROMETHAMINE 30 MG/ML
30 INJECTION, SOLUTION INTRAMUSCULAR; INTRAVENOUS ONCE
OUTPATIENT
Start: 2025-02-08 | End: 2025-02-08

## 2025-01-09 RX ORDER — ONDANSETRON HYDROCHLORIDE 2 MG/ML
4 INJECTION, SOLUTION INTRAVENOUS ONCE
OUTPATIENT
Start: 2025-02-08 | End: 2025-02-08

## 2025-01-09 RX ORDER — DIPHENHYDRAMINE HYDROCHLORIDE 50 MG/ML
50 INJECTION INTRAMUSCULAR; INTRAVENOUS AS NEEDED
OUTPATIENT
Start: 2025-02-08

## 2025-01-09 RX ORDER — KETOROLAC TROMETHAMINE 30 MG/ML
30 INJECTION, SOLUTION INTRAMUSCULAR; INTRAVENOUS ONCE
Status: COMPLETED | OUTPATIENT
Start: 2025-01-09 | End: 2025-01-09

## 2025-01-09 RX ORDER — KETAMINE HCL IN NACL, ISO-OSM 100MG/10ML
SYRINGE (ML) INJECTION ONCE
Status: COMPLETED | OUTPATIENT
Start: 2025-01-09 | End: 2025-01-09

## 2025-01-09 RX ORDER — ALBUTEROL SULFATE 0.83 MG/ML
3 SOLUTION RESPIRATORY (INHALATION) AS NEEDED
OUTPATIENT
Start: 2025-02-08

## 2025-01-09 RX ORDER — FAMOTIDINE 10 MG/ML
20 INJECTION INTRAVENOUS ONCE AS NEEDED
OUTPATIENT
Start: 2025-02-08

## 2025-01-09 RX ADMIN — PROPOFOL 100 MG: 10 INJECTION, EMULSION INTRAVENOUS at 13:29

## 2025-01-09 RX ADMIN — Medication: at 13:28

## 2025-01-09 RX ADMIN — KETOROLAC TROMETHAMINE 30 MG: 30 INJECTION, SOLUTION INTRAMUSCULAR at 13:28

## 2025-01-09 ASSESSMENT — ENCOUNTER SYMPTOMS
OCCASIONAL FEELINGS OF UNSTEADINESS: 0
DEPRESSION: 0
LOSS OF SENSATION IN FEET: 1

## 2025-01-09 ASSESSMENT — PAIN SCALES - GENERAL
PAINLEVEL_OUTOF10: 2
PAINLEVEL_OUTOF10: 0 - NO PAIN

## 2025-01-09 ASSESSMENT — PAIN - FUNCTIONAL ASSESSMENT
PAIN_FUNCTIONAL_ASSESSMENT: 0-10
PAIN_FUNCTIONAL_ASSESSMENT: 0-10

## 2025-01-09 NOTE — PATIENT INSTRUCTIONS
Today :We administered ketamine 30 mg-lidocaine 300 mg, propofol (Diprivan), and ketorolac.     For:   1. Lumbar radicular pain    2. Neurogenic claudication    3. Post-COVID chronic cough         Your next appointment is due in:  See [facility]      Pain Infusion Aftercare Instructions      1. It is normal to feel sedated, tired and low in energy after a pain infusion. DO NOT DRIVE, OPERATE ANY MACHINERY, OR MAKE ANY IMPORTANT DECISIONS FOR AT LEAST 24 HOURS AFTER THE INFUSION.     2. Call the pain center at [phone] with any problems, questions, or concerns.     3. Eat light after the infusion. If you feel queasy or sick to your stomach, laying down with your eyes closed may help. When you resume eating start with something mild like clear liquids, yogurt, applesauce, crackers, etc… Gradually advance to a regular diet.     4. Do not leave your house alone the evening of your pain infusion.     5. No alcohol or sedative medications, such as sleeping pills, for 24 hours after your pain infusion.     6. Resume all other prescribed medications unless directed otherwise by you physician.     7. If you have any medical emergencies, call 911 or go directly to the closest emergency room.        Please read the  Medication Guide that was given to you and reviewed during todays visit.     (Tell all doctors including dentists that you are taking this medication)     Go to the emergency room or call 911 if:  -You have signs of allergic reaction:   -Rash, hives, itching.   -Swollen, blistered, peeling skin.   -Swelling of face, lips, mouth, tongue or throat.   -Tightness of chest, trouble breathing, swallowing or talking     Call your doctor:  - If IV / injection site gets red, warm, swollen, itchy or leaks fluid or pus.     (Leave dressing on your IV site for at least 2 hours and keep area clean and dry  - If you get sick or have symptoms of infection or are not feeling well for any  reason.    (Wash your hands often, stay away from people who are sick)  - If you have side effects from your medication that do not go away or are bothersome.     (Refer to the teaching your nurse gave you for side effects to call your doctor about)    - Common side effects may include:  stuffy nose, headache, feeling tired, muscle aches, upset stomach  - Before receiving any vaccines     - Call [facility] at   If:  - You get sick, are on antibiotics, have had a recent vaccine, have surgery or dental work and your doctor wants your visit rescheduled.  - You need to cancel and reschedule your visit for any reason. Call at least 2 days before your visit if you need to cancel.   - Your insurance changes before your next visit.    (We will need to get approval from your new insurance. This can take up to two weeks.)     The [facility] is opened Monday thru Friday. We are closed on weekends and holidays.   Voice mail will take your call if the center is closed. If you leave a message please allow 24 hours for a call back during weekdays. If you leave a message on a weekend/holiday, we will call you back the next business day.    A pharmacist is available Monday - Friday from 8:30AM to 3:30PM to help answer any questions you may have about your prescriptions(s). Please call pharmacy at:    [facility]: [phone]  [facility]: [phone]  [facility]: [phone]

## 2025-01-09 NOTE — PROGRESS NOTES
S: Patient here for 19 th opioid sparing pain infusion. Patient reports 70% reduction in pain after last infusion that lasted 3 weeks.    Purpose of pain infusion meds explained along with potential side effects.  Patient verbalized understanding.    B: Pain Issues. Is Patient breast feeding: N/A    A: Patient currently has pain described on flow sheet documentation. Designated  is  [phone]. Patient last ate solid food >8 hours ago, and had liquid >1 hours ago.    R: Plan; Obtain IV access, do patient risk assessment, and start opioid sparing infusion as ordered. Monitoring for S/S of adverse reactions.    1345: Patient is restful as of this time, tolerating pain infusion well. Continuing to monitor.    1410:Post infusion teaching provided. Patient verbalized understanding. VSS, Patient states pain is gone. Will assist patient to waiting car via wheelchair.

## 2025-01-30 ENCOUNTER — ANTICOAGULATION - WARFARIN VISIT (OUTPATIENT)
Dept: CARDIOLOGY | Facility: CLINIC | Age: 65
End: 2025-01-30
Payer: MEDICARE

## 2025-01-30 DIAGNOSIS — Z86.718 HISTORY OF DEEP VEIN THROMBOSIS: ICD-10-CM

## 2025-01-30 DIAGNOSIS — I74.9 ARTERIAL EMBOLISM (MULTI): Primary | ICD-10-CM

## 2025-01-30 DIAGNOSIS — I73.9 PAD (PERIPHERAL ARTERY DISEASE) (CMS-HCC): ICD-10-CM

## 2025-01-30 LAB
POC INR: 2 (ref 2–3)
POC PROTHROMBIN TIME: NORMAL

## 2025-01-30 PROCEDURE — 99211 OFF/OP EST MAY X REQ PHY/QHP: CPT

## 2025-01-30 PROCEDURE — 85610 PROTHROMBIN TIME: CPT | Mod: QW | Performed by: INTERNAL MEDICINE

## 2025-01-30 NOTE — PROGRESS NOTES
Patient identification verified with 2 identifiers.    Location: [facility] - [address] [place] [address] [phone]     Referring Physician: Dr. Stephan Martins  Enrollment/ Re-enrollment date: 2025   INR Goal: 2.0-3.0  INR monitoring is per [facility] protocol.  Anticoagulation Medication: warfarin  Indication: Peripheral Artery Disease (PAD)    Subjective   Pt present by herself for today's visit.  Bleeding signs/symptoms: No.  Pt denies.    Bruising: No.  Pt denies.  Major bleeding event: No  Thrombosis signs/symptoms: No  Thromboembolic event: No  Missed doses: No.  Pt denies.  Extra doses: No.    Medication changes: No.  Pt is back getting Ketamine infusions and is on Repatha.   Dietary changes: No.  Pt denies.  Change in health: No.  Pt denies.  Change in activity: No.  Pt denies.  Alcohol: No.  Pt denies.  Other concerns: No    Upcoming Procedures:  Does the Patient Have any upcoming procedures that require interruption in anticoagulation therapy? no  Does the patient require bridging? no    Dose maintained after last visit.  Pt is currently taking 24mg of warfarin weekly.  Anticoagulation Summary  As of 2025      INR goal:  2.0-3.0   TTR:  70.3% (1.3 y)   INR used for dosin.00 (2025)   Weekly warfarin total:  24 mg               Assessment/Plan   Therapeutic     1. New dose: no change.  Maintain dose.  24mg weekly.  2. Next INR: 1 month.   Can r/s in 4 weeks if therapeutic.      Education provided to patient during the visit:  Patient instructed to call in interim with questions, concerns and changes.   Patient educated on interactions between medications and warfarin.   Patient educated on dietary consistency in vitamin k consumption.   Patient educated on affects of alcohol consumption while taking warfarin.   Patient educated on signs of bleeding/clotting.   Patient educated on compliance with dosing, follow up appointments, and prescribed plan of  care.

## 2025-02-03 DIAGNOSIS — I82.411 ACUTE EMBOLISM AND THROMBOSIS OF RIGHT FEMORAL VEIN (MULTI): ICD-10-CM

## 2025-02-03 RX ORDER — WARFARIN 3 MG/1
3 TABLET ORAL NIGHTLY
Qty: 103 TABLET | Refills: 0 | Status: SHIPPED | OUTPATIENT
Start: 2025-02-03

## 2025-02-04 DIAGNOSIS — M54.16 LUMBAR RADICULAR PAIN: Primary | ICD-10-CM

## 2025-02-04 DIAGNOSIS — U09.9 POST-COVID CHRONIC COUGH: ICD-10-CM

## 2025-02-04 DIAGNOSIS — R29.818 NEUROGENIC CLAUDICATION: ICD-10-CM

## 2025-02-04 DIAGNOSIS — R05.3 POST-COVID CHRONIC COUGH: ICD-10-CM

## 2025-02-04 RX ORDER — KETAMINE HCL IN NACL, ISO-OSM 100MG/10ML
SYRINGE (ML) INJECTION ONCE
OUTPATIENT
Start: 2025-02-07

## 2025-02-04 RX ORDER — KETOROLAC TROMETHAMINE 30 MG/ML
30 INJECTION, SOLUTION INTRAMUSCULAR; INTRAVENOUS ONCE
OUTPATIENT
Start: 2025-02-07 | End: 2025-02-07

## 2025-02-06 DIAGNOSIS — G89.4 CHRONIC PAIN SYNDROME: ICD-10-CM

## 2025-02-06 DIAGNOSIS — M54.16 LUMBAR RADICULAR PAIN: ICD-10-CM

## 2025-02-06 DIAGNOSIS — I73.9 PAD (PERIPHERAL ARTERY DISEASE) (CMS-HCC): ICD-10-CM

## 2025-02-06 RX ORDER — OXYCODONE HYDROCHLORIDE 5 MG/1
7.5 TABLET ORAL 2 TIMES DAILY PRN
Qty: 90 TABLET | Refills: 0 | Status: SHIPPED | OUTPATIENT
Start: 2025-02-07 | End: 2025-03-09

## 2025-02-12 ENCOUNTER — APPOINTMENT (OUTPATIENT)
Dept: CARDIOLOGY | Facility: CLINIC | Age: 65
End: 2025-02-12
Payer: MEDICARE

## 2025-02-18 ENCOUNTER — APPOINTMENT (OUTPATIENT)
Dept: VASCULAR SURGERY | Facility: CLINIC | Age: 65
End: 2025-02-18
Payer: MEDICARE

## 2025-02-18 ENCOUNTER — APPOINTMENT (OUTPATIENT)
Dept: PRIMARY CARE | Facility: CLINIC | Age: 65
End: 2025-02-18
Payer: MEDICARE

## 2025-02-18 ENCOUNTER — APPOINTMENT (OUTPATIENT)
Dept: VASCULAR MEDICINE | Facility: CLINIC | Age: 65
End: 2025-02-18
Payer: MEDICARE

## 2025-02-24 ENCOUNTER — HOSPITAL ENCOUNTER (OUTPATIENT)
Dept: RADIOLOGY | Facility: HOSPITAL | Age: 65
Discharge: HOME | End: 2025-02-24
Payer: MEDICARE

## 2025-02-24 ENCOUNTER — HOSPITAL ENCOUNTER (OUTPATIENT)
Dept: RADIOLOGY | Facility: EXTERNAL LOCATION | Age: 65
Discharge: HOME | End: 2025-02-24

## 2025-02-24 ENCOUNTER — OFFICE VISIT (OUTPATIENT)
Dept: ORTHOPEDIC SURGERY | Facility: CLINIC | Age: 65
End: 2025-02-24
Payer: MEDICARE

## 2025-02-24 DIAGNOSIS — M77.8 TENDINITIS OF RIGHT SHOULDER: Primary | ICD-10-CM

## 2025-02-24 DIAGNOSIS — M75.51 BURSITIS OF RIGHT SHOULDER: ICD-10-CM

## 2025-02-24 DIAGNOSIS — M25.511 ACUTE PAIN OF RIGHT SHOULDER: ICD-10-CM

## 2025-02-24 PROCEDURE — 73030 X-RAY EXAM OF SHOULDER: CPT | Mod: RT

## 2025-02-24 PROCEDURE — 99214 OFFICE O/P EST MOD 30 MIN: CPT | Performed by: FAMILY MEDICINE

## 2025-02-24 PROCEDURE — 20611 DRAIN/INJ JOINT/BURSA W/US: CPT | Performed by: FAMILY MEDICINE

## 2025-02-24 PROCEDURE — 73030 X-RAY EXAM OF SHOULDER: CPT | Mod: RIGHT SIDE | Performed by: RADIOLOGY

## 2025-02-24 RX ORDER — TRIAMCINOLONE ACETONIDE 40 MG/ML
40 INJECTION, SUSPENSION INTRA-ARTICULAR; INTRAMUSCULAR
Status: COMPLETED | OUTPATIENT
Start: 2025-02-24 | End: 2025-02-24

## 2025-02-24 RX ORDER — CYCLOBENZAPRINE HCL 10 MG
10 TABLET ORAL NIGHTLY PRN
Qty: 14 TABLET | Refills: 0 | Status: SHIPPED | OUTPATIENT
Start: 2025-02-24 | End: 2025-03-10

## 2025-02-24 RX ORDER — LIDOCAINE HYDROCHLORIDE 10 MG/ML
6 INJECTION, SOLUTION INFILTRATION; PERINEURAL
Status: COMPLETED | OUTPATIENT
Start: 2025-02-24 | End: 2025-02-24

## 2025-02-24 RX ADMIN — LIDOCAINE HYDROCHLORIDE 6 ML: 10 INJECTION, SOLUTION INFILTRATION; PERINEURAL at 15:18

## 2025-02-24 RX ADMIN — TRIAMCINOLONE ACETONIDE 40 MG: 40 INJECTION, SUSPENSION INTRA-ARTICULAR; INTRAMUSCULAR at 15:18

## 2025-02-24 NOTE — PROGRESS NOTES
Acute Injury New Patient Visit    CC:   Chief Complaint   Patient presents with    Right Shoulder - Pain     Rt shoulder pain  Xrays today       HPI: Angle is a 64 y.o.female who presents today with new complaints of acute onset 5 days of severe pain and discomfort to the right shoulder.  While at rest she states she is not having any issues however any initiation of movement causes pain and discomfort throughout the shoulder.  She denies a history of diabetes or thyroid issues.  Has a history of prior distal clavicle lysis and bilateral rotator cuff surgeries in the past.  She denies any obvious injury or trauma but was recently playing and was using the right shoulder with a bag says it was not very heavy is not really certain exactly what is happened to cause so much pain and discomfort.        Review of Systems   GENERAL: Negative for malaise, significant weight loss, fever  MUSCULOSKELETAL: See HPI  NEURO: Negative for numbness / tingling     Past Medical History  Past Medical History:   Diagnosis Date    COVID-19     COVID    Personal history of other venous thrombosis and embolism     History of deep venous thrombosis       Medication review  Medication Documentation Review Audit       Reviewed by Lesli Tolentino MA (Medical Assistant) on 02/03/25 at 1118      Medication Order Taking? Sig Documenting Provider Last Dose Status   alpha lipoic acid 600 mg capsule [id number] No Take 1 capsule by mouth 2 times a day. Historical Provider, MD Taking Active   aspirin 81 mg EC tablet [id number]  Chew 1 tablet (81 mg) once daily in the morning. Historical Provider, MD  Active   buPROPion XL (Wellbutrin XL) 150 mg 24 hr tablet [id number] No Please take 1 tablet by mouth with BREAKFAST every morning.  Do not crush, chew, or split. Stephan Martins MD Taking Active   cholecalciferol (Vitamin D-3) 50,000 unit capsule [id number] No TAKE 1 CAPSULE Weekly SUNDAYS PLEASE with food and full glass water. Thank you Stephan  Zachary Martins MD Taking Active   cyanocobalamin (Vitamin B-12) 100 mcg tablet [id number] No Please take 1 tablet by mouth with LUNCH every day.  Thank you. Stephan Martins MD Taking Active   dilTIAZem XR (Dilacor XR) 120 mg 24 hr capsule [id number] No Take 1 capsule (120 mg) by mouth once daily. Take 1 capsule once daily. Stephan Martins MD Taking Active   DULoxetine (Cymbalta) 60 mg DR capsule [id number] No Take 1 capsule (60 mg) by mouth once daily. Do not crush or chew. Stephan Martins MD Taking Active   evolocumab (Repatha SureClick) 140 mg/mL injection [id number]  Inject 1 mL (140 mg) under the skin every 14 (fourteen) days. Leonardo Romero MD  Active   famotidine (Pepcid) 20 mg tablet [id number] No Please take 1 tablet by mouth before breakfast time, and again 1 tablet by mouth before suppertime.  Please take twice a day even if you are not going to eat.  Thank you. Stephan Martins MD Taking Active   folic acid (Folvite) 1 mg tablet [id number] No Please take 1 tablet by mouth with lunch every day.  Thank you. Stephan Martins MD Taking Active   gabapentin (Neurontin) 600 mg tablet [id number]  Take 1 tablet (600 mg) by mouth 3 times a day. Jose Armando Velazquez MD  Active   ipratropium-albuteroL (Duo-Neb) 0.5-2.5 mg/3 mL nebulizer solution [id number] No Inhale 3 mL 3 times a day as needed. Historical Provider, MD Taking Active   ketamine HCl in 0.9 % NaCl (ketamine in 0.9 % sod chloride) 10 mg/mL solution [id number] No Infuse into a venous catheter. Historical Provider, MD Taking Active   melatonin 5 mg tablet [id number] No Please take 1 tablet by mouth after supper every evening.  Watch out for daytime sleepiness.  No driving if drowsy.  Thank you. Stephan Martins MD Taking Active   naloxone (Narcan) 4 mg/0.1 mL nasal spray [id number] No Administer 1 spray (4 mg) into affected nostril(s) if needed for opioid reversal. May repeat every 2-3 minutes if needed, alternating nostrils,  until medical assistance becomes available. Stephan Martins MD Taking Active   ondansetron ODT (Zofran-ODT) 4 mg disintegrating tablet [id number] No Please melt on tongue every 6-8 hours as needed for nausea.  Lots of fluids throughout the day.  Thank you. Stephan Martins MD Taking Active   oxyCODONE (Roxicodone) 5 mg immediate release tablet [id number]  Take 1.5 tablets (7.5 mg) by mouth 2 times a day as needed for severe pain (7 - 10). Do not fill before January 8, 2025. Jose Armando Velazquez MD  Active   pancrelipase, Lip-Prot-Amyl, (Creon) 12,000-38,000 -60,000 unit capsule [id number] No Please take 2 capsules by mouth 3 times a day with meals, and please take 1 capsule twice a day for snacks.  Thank you.   Patient not taking: Reported on 1/9/2025    Stephan Martins MD Taking Active   pantoprazole (ProtoNix) 40 mg EC tablet [id number] No Take 1 tablet (40 mg) by mouth once daily in the morning. Historical Provider, MD Taking Active   polyethylene glycol (Glycolax, Miralax) 17 gram packet [id number] No Take 8.5 g by mouth once daily as needed. Historical Provider, MD Taking Active   traZODone (Desyrel) 50 mg tablet [id number] No Take 0.5 tablets (25 mg) by mouth as needed at bedtime.   Patient not taking: Reported on 1/9/2025    Historical Provider, MD Taking Active   warfarin (Coumadin) 3 mg tablet [id number]  Take 1 tablet (3 mg) by mouth once daily at bedtime. Take as directed per After Visit Summary.   Patient taking differently: Take 1 tablet (3 mg) by mouth once daily at bedtime. Take as directed per After Visit Summary. 3 mg on Sunday, Monday, Tuesday and Friday. 4.5mg on Wednesday and Sunday.    Stephan Martins MD  Active                    Allergies  Allergies   Allergen Reactions    Promethazine Other     Psychosis    Phenothiazines Anxiety, Other, Hallucinations and Rash     eCW Converted Reaction [id number]       Social History  Social History     Socioeconomic History     Marital status:      Spouse name: Not on file    Number of children: Not on file    Years of education: Not on file    Highest education level: Not on file   Occupational History    Not on file   Tobacco Use    Smoking status: Former     Current packs/day: 0.00     Types: Cigarettes     Quit date:      Years since quittin.1    Smokeless tobacco: Never   Substance and Sexual Activity    Alcohol use: Yes    Drug use: Never    Sexual activity: Not on file   Other Topics Concern    Not on file   Social History Narrative    Not on file     Social Drivers of Health     Financial Resource Strain: Low Risk  (2024)    Overall Financial Resource Strain (CARDIA)     Difficulty of Paying Living Expenses: Not hard at all   Food Insecurity: No Food Insecurity (2024)    [facility]    Hunger Vital Sign     Worried About Running Out of Food in the Last Year: Never true     Ran Out of Food in the Last Year: Never true   Transportation Needs: No Transportation Needs (2024)    PRAPARE - Transportation     Lack of Transportation (Medical): No     Lack of Transportation (Non-Medical): No   Physical Activity: Not on file   Stress: Not on file   Social Connections: Not on file   Intimate Partner Violence: Not on file   Housing Stability: Low Risk  (2024)    Housing Stability Vital Sign     Unable to Pay for Housing in the Last Year: No     Number of Places Lived in the Last Year: 1     Unstable Housing in the Last Year: No       Surgical History  Past Surgical History:   Procedure Laterality Date    CT ANGIO AORTA AND BILATERAL ILIOFEMORAL RUN OFF INCLUDING WITHOUT CONTRAST IF PERFORMED  2022    CT AORTA AND BILATERAL ILIOFEMORAL RUNOFF ANGIOGRAM W AND/OR WO IV CONTRAST 2022 DOCTOR OFFICE LEGAC[facility]    CT ANGIO AORTA AND BILATERAL ILIOFEMORAL RUN OFF INCLUDING WITHOUT CONTRAST IF PERFORMED  2022    CT AORTA AND BILATERAL ILIOFEMORAL RUNOFF ANGIOGRAM W  AND/OR WO IV CONTRAST 12/13/2022 DOCTOR OFFICE LEGACY    OTHER SURGICAL HISTORY  07/08/2022    Tubal ligation    OTHER SURGICAL HISTORY  07/08/2022    Shoulder surgery    OTHER SURGICAL HISTORY  07/08/2022    Gallbladder surgery       Physical Exam:  GENERAL:  Patient is awake, alert, and oriented to person place and time.  Patient appears well nourished and well kept.  Affect Calm, Not Acutely Distressed.  HEENT:  Normocephalic, Atraumatic, EOMI  CARDIOVASCULAR:  Hemodynamically stable.  RESPIRATORY:  Normal respirations with unlabored breathing.  NEURO: Gross sensation intact to the upper extremities bilaterally.  Extremity: Moderate global tenderness circumferentially about the shoulder with tenderness at the insertion of the biceps tendon and subacromial bursa being the most prominent.  Forward flexion limited to 90 degrees forward 45 laterally limited internal extra rotation.  Positive Neer's Garza and [place]'s test.  Positive speeds and Yergason's.  Distal pulses and sensation are intact no pain about the elbow forearm or wrist.      Diagnostics: X-rays today demonstrate mild chronic degenerative changes with well-preserved glenohumeral joint spacing residence of previous distal clavicle lysis noted.        Procedure: Right shoulder injection today as below  L Inj/Asp: R glenohumeral on 2/24/2025 3:18 PM  Indications: pain  Details: 22 G needle, ultrasound-guided posterior approach  Medications: 40 mg triamcinolone acetonide 40 mg/mL; 6 mL lidocaine 10 mg/mL (1 %)  Outcome: tolerated well, no immediate complications  Procedure, treatment alternatives, risks and benefits explained, specific risks discussed. Consent was given by the patient. Immediately prior to procedure a time out was called to verify the correct patient, procedure, equipment, support staff and site/side marked as required. Patient was prepped and draped in the usual sterile fashion.           Assessment:   Problem List Items Addressed This  Visit    None  Visit Diagnoses       Tendinitis of right shoulder    -  Primary    Relevant Medications    cyclobenzaprine (Flexeril) 10 mg tablet    Other Relevant Orders    Point of Care Ultrasound (Completed)    L Inj/Asp: R glenohumeral    Acute pain of right shoulder        Relevant Medications    cyclobenzaprine (Flexeril) 10 mg tablet    Other Relevant Orders    XR shoulder right 2+ views    Point of Care Ultrasound (Completed)    L Inj/Asp: R glenohumeral    Bursitis of right shoulder        Relevant Medications    cyclobenzaprine (Flexeril) 10 mg tablet    Other Relevant Orders    L Inj/Asp: R glenohumeral             Plan: Patient was provided with a steroid injection to the right shoulder here today.  She was also provided with a home exercise program for range of motion and strength recovery.  We provided her with a muscle relaxer as well for nocturnal symptomatic relief.  Recommended ice and/or heat to assist with swelling and discomfort.  Will plan to see her back in 3 to 4 weeks for repeat evaluation if significant or worsening pain we will consider further evaluation with an MRI if she is significantly better but has some mild areas of discomfort so we can consider soft tissue injection in the bursa or proximal biceps if necessary.  Postprocedure patient noticed significant immediate symptomatic relief she was able to forward flex to at least 95 degrees laterally abduction 90.  Orders Placed This Encounter    L Inj/Asp: R glenohumeral    XR shoulder right 2+ views    Point of Care Ultrasound    cyclobenzaprine (Flexeril) 10 mg tablet      At the conclusion of the visit there were no further questions by the patient/family regarding their plan of care.  Patient was instructed to call or return with any issues, questions, or concerns regarding their injury and/or treatment plan described above.     02/24/25 at 3:48 PM - Cole C Budinsky, MD    Office: [phone]    This note was prepared using voice  recognition software.  The details of this note are correct and have been reviewed, and corrected to the best of my ability.  Some grammatical errors may persist related to the Dragon software.

## 2025-02-27 ENCOUNTER — ANTICOAGULATION - WARFARIN VISIT (OUTPATIENT)
Dept: CARDIOLOGY | Facility: CLINIC | Age: 65
End: 2025-02-27
Payer: MEDICARE

## 2025-02-27 DIAGNOSIS — I73.9 PAD (PERIPHERAL ARTERY DISEASE) (CMS-HCC): ICD-10-CM

## 2025-02-27 DIAGNOSIS — Z86.718 HISTORY OF DEEP VEIN THROMBOSIS: ICD-10-CM

## 2025-02-27 DIAGNOSIS — I74.9 ARTERIAL EMBOLISM (MULTI): Primary | ICD-10-CM

## 2025-02-27 LAB
POC INR: 2.1 (ref 2–3)
POC PROTHROMBIN TIME: NORMAL

## 2025-02-27 PROCEDURE — 99211 OFF/OP EST MAY X REQ PHY/QHP: CPT

## 2025-02-27 PROCEDURE — 85610 PROTHROMBIN TIME: CPT | Mod: QW | Performed by: INTERNAL MEDICINE

## 2025-02-27 NOTE — PROGRESS NOTES
Patient identification verified with 2 identifiers.    Location: [facility] - [address] [place] [address] [phone]     Referring Physician: Dr. Stephan Martins  Enrollment/ Re-enrollment date: 2025   INR Goal: 2.0-3.0  INR monitoring is per [facility] protocol.  Anticoagulation Medication: warfarin  Indication: Peripheral Artery Disease (PAD)    Subjective   Pt present by herself for today's visit.  Bleeding signs/symptoms: No.  Pt denies.    Bruising: No.  Pt denies.  Major bleeding event: No  Thrombosis signs/symptoms: No  Thromboembolic event: No  Missed doses: No.  Pt denies.  Extra doses: No.    Medication changes: No.  Pt is back getting Ketamine infusions and is on Repatha.   Dietary changes: No.  Pt denies.  Change in health: No.  Pt denies.  Change in activity: No.  Pt denies.  Alcohol: No.  Pt denies.  Other concerns: No    Upcoming Procedures:  Does the Patient Have any upcoming procedures that require interruption in anticoagulation therapy? no  Does the patient require bridging? no    Dose maintained after last visit.  Pt is currently taking 24mg of warfarin weekly.  Anticoagulation Summary  As of 2025      INR goal:  2.0-3.0   TTR:  72.0% (1.4 y)   INR used for dosin.10 (2025)   Weekly warfarin total:  24 mg               Assessment/Plan   Therapeutic     1. New dose: no change.  Maintain dose.  24mg weekly.  2. Next INR: 1 month.   Can r/s in 4 weeks if therapeutic.      Education provided to patient during the visit:  Patient instructed to call in interim with questions, concerns and changes.   Patient educated on interactions between medications and warfarin.   Patient educated on dietary consistency in vitamin k consumption.   Patient educated on affects of alcohol consumption while taking warfarin.   Patient educated on signs of bleeding/clotting.   Patient educated on compliance with dosing, follow up appointments, and prescribed plan of  care.

## 2025-03-03 ENCOUNTER — OFFICE VISIT (OUTPATIENT)
Dept: PAIN MEDICINE | Facility: CLINIC | Age: 65
End: 2025-03-03
Payer: MEDICARE

## 2025-03-03 VITALS
HEIGHT: 65 IN | HEART RATE: 75 BPM | BODY MASS INDEX: 27.82 KG/M2 | RESPIRATION RATE: 18 BRPM | SYSTOLIC BLOOD PRESSURE: 124 MMHG | WEIGHT: 167 LBS | DIASTOLIC BLOOD PRESSURE: 57 MMHG | TEMPERATURE: 98.1 F | OXYGEN SATURATION: 98 %

## 2025-03-03 DIAGNOSIS — Z79.891 LONG TERM (CURRENT) USE OF OPIATE ANALGESIC: ICD-10-CM

## 2025-03-03 DIAGNOSIS — I73.9 PAD (PERIPHERAL ARTERY DISEASE) (CMS-HCC): ICD-10-CM

## 2025-03-03 DIAGNOSIS — G89.4 CHRONIC PAIN SYNDROME: ICD-10-CM

## 2025-03-03 DIAGNOSIS — M54.16 LUMBAR RADICULAR PAIN: ICD-10-CM

## 2025-03-03 PROCEDURE — 1036F TOBACCO NON-USER: CPT | Performed by: ANESTHESIOLOGY

## 2025-03-03 PROCEDURE — 99213 OFFICE O/P EST LOW 20 MIN: CPT | Performed by: ANESTHESIOLOGY

## 2025-03-03 PROCEDURE — 3008F BODY MASS INDEX DOCD: CPT | Performed by: ANESTHESIOLOGY

## 2025-03-03 PROCEDURE — 3074F SYST BP LT 130 MM HG: CPT | Performed by: ANESTHESIOLOGY

## 2025-03-03 PROCEDURE — 3078F DIAST BP <80 MM HG: CPT | Performed by: ANESTHESIOLOGY

## 2025-03-03 RX ORDER — OXYCODONE HYDROCHLORIDE 5 MG/1
7.5 TABLET ORAL 2 TIMES DAILY PRN
Qty: 90 TABLET | Refills: 0 | Status: SHIPPED | OUTPATIENT
Start: 2025-03-09 | End: 2025-04-08

## 2025-03-03 ASSESSMENT — ENCOUNTER SYMPTOMS
CONSTIPATION: 0
SHORTNESS OF BREATH: 0
ADENOPATHY: 0
DEPRESSION: 0
OCCASIONAL FEELINGS OF UNSTEADINESS: 0
FEVER: 0
LOSS OF SENSATION IN FEET: 0
BLOOD IN STOOL: 0
ARTHRALGIAS: 1

## 2025-03-03 ASSESSMENT — PAIN SCALES - GENERAL
PAINLEVEL_OUTOF10: 2
PAINLEVEL_OUTOF10: 2

## 2025-03-03 ASSESSMENT — PAIN DESCRIPTION - DESCRIPTORS: DESCRIPTORS: BURNING;ACHING;SHARP

## 2025-03-03 ASSESSMENT — PAIN - FUNCTIONAL ASSESSMENT: PAIN_FUNCTIONAL_ASSESSMENT: 0-10

## 2025-03-03 NOTE — PROGRESS NOTES
Chief Complain  Pain and Follow-up     History Of Present Illness  Angle Martins is a 64 y.o. female here for right leg pain and right shoulder paib. The patient rates the pain at 3  on a scale from 0-10.  The patient describes pain as aching, radiating, burning.  The pain is worsened by bending forward and walking and is alleviated by medications aspirin and prescribed pain medications.  Since the last visit the pain has stayed the same.  The patient denies any fever, chills, weight loss, bladder/bowel incontinence.     Past Medical History  She has a past medical history of COVID-19 and Personal history of other venous thrombosis and embolism.    Surgical History  She has a past surgical history that includes Other surgical history (07/08/2022); Other surgical history (07/08/2022); Other surgical history (07/08/2022); CT angio aorta and bilateral iliofemoral runoff including without contrast if performed (12/8/2022); and CT angio aorta and bilateral iliofemoral runoff including without contrast if performed (12/13/2022).     Social History  She reports that she quit smoking about 37 years ago. Her smoking use included cigarettes. She has never used smokeless tobacco. She reports current alcohol use. She reports that she does not use drugs.    Family History  No family history on file.     Allergies  Promethazine and Phenothiazines    Review of Systems  Review of Systems   Constitutional:  Negative for fever.   Respiratory:  Negative for shortness of breath.    Cardiovascular:  Negative for chest pain.   Gastrointestinal:  Negative for blood in stool and constipation.   Musculoskeletal:  Positive for arthralgias.   Skin:  Negative for rash.   Hematological:  Negative for adenopathy.   Psychiatric/Behavioral:  Negative for suicidal ideas.         Physical Exam  Physical Exam  Constitutional:       Appearance: Normal appearance.   HENT:      Head: Normocephalic and atraumatic.   Eyes:      Extraocular Movements:  "Extraocular movements intact.      Pupils: Pupils are equal, round, and reactive to light.   Pulmonary:      Effort: Pulmonary effort is normal.   Neurological:      Mental Status: She is alert and oriented to person, place, and time.   Psychiatric:         Mood and Affect: Mood normal.         Last Recorded Vitals  Blood pressure 124/57, pulse 75, temperature 36.7 °C (98.1 °F), resp. rate 18, height 1.638 m (5' 4.5\"), weight 75.8 kg (167 lb), SpO2 98%.       Assessment/Plan     Angle Martins is a 64 y.o. female here for follow-up of chronic right lower extremity pain.  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  She continues to have pain in the right lower extremity since then.  She is currently on oxycodone 7.5 mg twice daily, gabapentin 600 mg.  She reports around 60 to 70% relief of pain with the current regimen.  She denies any significant side effects.  Since the last visit she also reports right shoulder pain she has been eval by the orthopedics had an injection done without any benefit.  I encouraged her to follow-up with orthopedics regarding her shoulder pain.  Would continue with the current regimen.  Prescription for oxycodone was provided.  I have personally reviewed the OARRS report.  I have considered the risks of abuse, dependence, addiction and diversion.    Jose Armando Velazquez MD  "

## 2025-03-03 NOTE — PROGRESS NOTES
Pt is a follow up visit and is complaining of pain in her right knee that radiates down to her toes. Pt states that she has pain in her bilateral shoulders and had a right shoulder injection by ortho. Pt states her pain level is a 2/10 on the pain scale. Pt states that she receives ketamine pain infusions every month and gets 70% pain relief for 3 weeks.

## 2025-03-04 ENCOUNTER — OFFICE VISIT (OUTPATIENT)
Dept: VASCULAR SURGERY | Facility: CLINIC | Age: 65
End: 2025-03-04
Payer: MEDICARE

## 2025-03-04 ENCOUNTER — TELEPHONE (OUTPATIENT)
Dept: VASCULAR SURGERY | Facility: CLINIC | Age: 65
End: 2025-03-04

## 2025-03-04 ENCOUNTER — ANCILLARY PROCEDURE (OUTPATIENT)
Dept: VASCULAR MEDICINE | Facility: CLINIC | Age: 65
End: 2025-03-04
Payer: MEDICARE

## 2025-03-04 VITALS
HEIGHT: 65 IN | HEART RATE: 71 BPM | RESPIRATION RATE: 18 BRPM | WEIGHT: 167 LBS | SYSTOLIC BLOOD PRESSURE: 117 MMHG | BODY MASS INDEX: 27.82 KG/M2 | DIASTOLIC BLOOD PRESSURE: 82 MMHG

## 2025-03-04 DIAGNOSIS — I74.3 ARTERIAL EMBOLISM AND THROMBOSIS OF LOWER EXTREMITY (MULTI): ICD-10-CM

## 2025-03-04 DIAGNOSIS — I73.9 PAD (PERIPHERAL ARTERY DISEASE) (CMS-HCC): Primary | ICD-10-CM

## 2025-03-04 DIAGNOSIS — I73.9 PAD (PERIPHERAL ARTERY DISEASE) (CMS-HCC): ICD-10-CM

## 2025-03-04 PROCEDURE — 3074F SYST BP LT 130 MM HG: CPT | Performed by: SURGERY

## 2025-03-04 PROCEDURE — 3008F BODY MASS INDEX DOCD: CPT | Performed by: SURGERY

## 2025-03-04 PROCEDURE — 99215 OFFICE O/P EST HI 40 MIN: CPT | Performed by: SURGERY

## 2025-03-04 PROCEDURE — 93922 UPR/L XTREMITY ART 2 LEVELS: CPT | Performed by: SURGERY

## 2025-03-04 PROCEDURE — 93922 UPR/L XTREMITY ART 2 LEVELS: CPT

## 2025-03-04 PROCEDURE — 3079F DIAST BP 80-89 MM HG: CPT | Performed by: SURGERY

## 2025-03-04 PROCEDURE — 1036F TOBACCO NON-USER: CPT | Performed by: SURGERY

## 2025-03-04 RX ORDER — ENOXAPARIN SODIUM 100 MG/ML
80 INJECTION SUBCUTANEOUS EVERY 12 HOURS
Qty: 28 EACH | Refills: 0 | Status: SHIPPED | OUTPATIENT
Start: 2025-03-04 | End: 2025-03-18

## 2025-03-04 ASSESSMENT — VISUAL ACUITY: OU: 1

## 2025-03-04 ASSESSMENT — LIFESTYLE VARIABLES
SKIP TO QUESTIONS 9-10: 1
HOW OFTEN DO YOU HAVE SIX OR MORE DRINKS ON ONE OCCASION: NEVER
AUDIT-C TOTAL SCORE: 1
HOW OFTEN DO YOU HAVE A DRINK CONTAINING ALCOHOL: MONTHLY OR LESS
HOW MANY STANDARD DRINKS CONTAINING ALCOHOL DO YOU HAVE ON A TYPICAL DAY: 1 OR 2

## 2025-03-04 ASSESSMENT — ENCOUNTER SYMPTOMS
LOSS OF SENSATION IN FEET: 0
DEPRESSION: 0
OCCASIONAL FEELINGS OF UNSTEADINESS: 0

## 2025-03-04 ASSESSMENT — PAIN SCALES - GENERAL: PAINLEVEL_OUTOF10: 0-NO PAIN

## 2025-03-04 NOTE — PROGRESS NOTES
Primary Care Physician: Stephan Martins MD  Primary Cardiologist:       Date of Visit: 03/04/2025  2:15 PM EST  Location of visit: [facility] PHYSICIAN [facility]   Type of Visit: Follow up             Chief Complaint   Patient presents with    Follow-up     1 year follow up with testing.         HPI / Summary:   Angle Martins is a 64 y.o. female  with    who returns for routine follow up   She reports continued rest pain of her forefoot including discomfort and pain with cold exposure and not necessarily related to walking.  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  She has pulsatile signals at the digital level on the right foot but they are moderately dampened.  Her toe brachial index 0.64.  She does have this problem with thromboembolism.  She is on Coumadin.    I feel that she has not made further progress in terms of her neuropathy and any positive improvement in flow may help reverse her situation of forefoot pain and discomfort.  I am going to set her up for an angiogram and intervention as needed.  I will asked that she start taking Lovenox injections 3 days before her procedure and stop her Coumadin and then continue her Lovenox afterwards for up to 3 to 4 days while resuming her Coumadin and having her INR rechecked.  The risk and rationale for arteriogram and intervention was discussed and the patient is agreeable to proceeding.    12 system review is negative except as noted above        Medical History:   Past Medical History:   Diagnosis Date    COVID-19     COVID    Personal history of other venous thrombosis and embolism     History of deep venous thrombosis       Social History:   Tobacco Use: Medium Risk (3/4/2025)    Patient History     Smoking Tobacco Use: Former     Smokeless Tobacco Use: Never     Passive Exposure: Not on file         MEDICATIONS:   Current Outpatient Medications   Medication Instructions    alpha lipoic acid 600 mg capsule 1 capsule,  2 times daily    aspirin 81 mg, Every morning    buPROPion XL (Wellbutrin XL) 150 mg 24 hr tablet Please take 1 tablet by mouth with BREAKFAST every morning.  Do not crush, chew, or split.    cholecalciferol (Vitamin D-3) 50,000 unit capsule TAKE 1 CAPSULE Weekly SUNDAYS PLEASE with food and full glass water. Thank you    cyanocobalamin (Vitamin B-12) 100 mcg tablet Please take 1 tablet by mouth with LUNCH every day.  Thank you.    cyclobenzaprine (FLEXERIL) 10 mg, oral, Nightly PRN    dilTIAZem XR (DILACOR XR) 120 mg, oral, Daily, Take 1 capsule once daily.    DULoxetine (CYMBALTA) 60 mg, oral, Daily, Do not crush or chew.    enoxaparin (LOVENOX) 80 mg, subcutaneous, Every 12 hours    famotidine (Pepcid) 20 mg tablet Please take 1 tablet by mouth before breakfast time, and again 1 tablet by mouth before suppertime.  Please take twice a day even if you are not going to eat.  Thank you.    folic acid (Folvite) 1 mg tablet Please take 1 tablet by mouth with lunch every day.  Thank you.    gabapentin (NEURONTIN) 600 mg, oral, 3 times daily    ipratropium-albuteroL (Duo-Neb) 0.5-2.5 mg/3 mL nebulizer solution 3 mL, 3 times daily PRN    ketamine HCl in 0.9 % NaCl (ketamine in 0.9 % sod chloride) 10 mg/mL solution Infuse into a venous catheter.    melatonin 5 mg tablet Please take 1 tablet by mouth after supper every evening.  Watch out for daytime sleepiness.  No driving if drowsy.  Thank you.    naloxone (NARCAN) 4 mg, nasal, As needed, May repeat every 2-3 minutes if needed, alternating nostrils, until medical assistance becomes available.    ondansetron ODT (Zofran-ODT) 4 mg disintegrating tablet Please melt on tongue every 6-8 hours as needed for nausea.  Lots of fluids throughout the day.  Thank you.    [START ON 3/9/2025] oxyCODONE (ROXICODONE) 7.5 mg, oral, 2 times daily PRN    pantoprazole (PROTONIX) 40 mg, Every morning    polyethylene glycol (Glycolax, Miralax) 17 gram packet 0.5 packets, Daily PRN    Repatha  SureClick 140 mg, subcutaneous, Every 14 days    warfarin (COUMADIN) 3 mg, oral, Nightly, Take as directed per After Visit Summary. 3 mg on Sunday, Monday, Tuesday and Friday. 4.5mg on Wednesday and Sunday.         IMAGING REVIEWED:   Echocardiogram:   Echocardiogram     Narrative  [facility], [address] [place] [address]  Tel [phone] and Fax [phone]    TRANSTHORACIC ECHOCARDIOGRAM REPORT      Patient Name:     STARLA FRANKS     Jacquelyn Physician:  [id number] Carter Albert MD  Study Date:       12/9/2022          Referring           STANLEY GALLAGHER  Physician:  MRN/PID:          [id number]           PCP:  Accession/Order#: [id number]          [facility]  Location:  YOB: 1960           Fellow:             [id number] Lowell Acosta MD  Gender:           F                  Nurse:  Admit Date:       12/8/2022          Sonographer:        ALKA Shepherd RDCS  Admission Status: Inpatient -        Additional Staff:  Routine  Height:           162.56 cm          CC Report to:       [facility]  Weight:           88.91 kg           Study Type:         Echocardiogram  BSA:              1.94 m2  Blood Pressure: 149 /63 mmHg    Diagnosis/ICD: Z13.6-Encounter for screening for cardiovascular disorders  Indication:    Hypercoagulable state  Procedure/CPT: Echo Complete w Full Doppler-[id number]    Patient History:  Pertinent History: HTN, Hyperlipidemia, TIA and Previous DVT. covid recovered,  hypoxic respiratory failure.    Study Detail: The following Echo studies were performed: 2D, M-Mode, color flow  and Doppler. Technically challenging study due to patient lying in  supine position. Agitated saline used as a contrast agent for  intraseptal flow evaluation.      PHYSICIAN INTERPRETATION:  Left Ventricle: The left ventricular systolic function is normal, with an estimated ejection fraction of 65-70%. There are no regional wall motion abnormalities.  The left ventricular cavity size is normal. There is moderately increased left ventricular posterior wall thickness. There is mild concentric left ventricular hypertrophy. Spectral Doppler shows a normal pattern of left ventricular diastolic filling.  Left Atrium: The left atrium is normal in size. There is no evidence of a patent foramen ovale. A bubble study using agitated saline was performed. Bubble study is negative.  Right Ventricle: The right ventricle is normal in size. There is normal right ventricular global systolic function.  Right Atrium: The right atrium is normal in size.  Aortic Valve: The aortic valve is trileaflet. There is minimal aortic valve cusp calcification. There is no evidence of aortic valve regurgitation. The peak instantaneous gradient of the aortic valve is 7.5 mmHg.  Mitral Valve: The mitral valve is normal in structure. There is trace mitral valve regurgitation.  Tricuspid Valve: The tricuspid valve is structurally normal. No evidence of tricuspid regurgitation. The right ventricular systolic pressure is unable to be estimated.  Pulmonic Valve: The pulmonic valve is not well visualized. There is no indication of pulmonic valve regurgitation.  Pericardium: There is no pericardial effusion noted.  Aorta: The aortic root is normal.  Systemic Veins: The inferior vena cava appears to be of normal size.  In comparison to the previous echocardiogram(s): There are no prior studies on this patient for comparison purposes.      CONCLUSIONS:  1. Left ventricular systolic function is normal with a 65-70% estimated ejection fraction.  2. Poorly visualized anatomical structures due to suboptimal image quality.  3. The left ventricular posterior wall thickness is moderately increased.  4. There is no evidence of a patent foramen ovale.    QUANTITATIVE DATA SUMMARY:  2D MEASUREMENTS:  Normal Ranges:  Ao Root d:     2.70 cm   (2.0-3.7cm)  LAs:           3.00 cm   (2.7-4.0cm)  IVSd:          0.70 cm    (0.6-1.1cm)  LVPWd:         1.50 cm   (0.6-1.1cm)  LVIDd:         4.70 cm   (3.9-5.9cm)  LVIDs:         3.40 cm  LV Mass Index: 96.7 g/m2  LV % FS        27.7 %    LA VOLUME:  Normal Ranges:  LA Vol A4C:        48.3 ml    (22+/-6mL/m2)  LA Vol A2C:        44.0 ml  LA Vol BP:         46.4 ml  LA Vol Index A4C:  24.9ml/m2  LA Vol Index A2C:  22.7 ml/m2  LA Vol Index BP:   23.9 ml/m2  LA Area A4C:       17.5 cm2  LA Area A2C:       16.6 cm2  LA Major Axis A4C: 5.4 cm  LA Major Axis A2C: 5.3 cm  LA Volume Index:   23.9 ml/m2    RA VOLUME BY A/L METHOD:  Normal Ranges:  RA Area A4C: 10.9 cm2    AORTA MEASUREMENTS:  Normal Ranges:  Asc Ao, d: 2.50 cm (2.1-3.4cm)    LV SYSTOLIC FUNCTION BY 2D PLANIMETRY (MOD):  Normal Ranges:  EF-A4C View: 77.9 % (>=55%)  EF-A2C View: 65.0 %  EF-Biplane:  71.9 %    LV DIASTOLIC FUNCTION:  Normal Ranges:  MV Peak E:    0.56 m/s    (0.7-1.2 m/s)  MV Peak A:    0.65 m/s    (0.42-0.7 m/s)  E/A Ratio:    0.86        (1.0-2.2)  MV e'         0.08 m/s    (>8.0)  MV lateral e' 0.11 m/s  MV medial e'  0.06 m/s  MV A Dur:     119.00 msec  E/e' Ratio:   6.55        (<8.0)  MV DT:        211 msec    (150-240 msec)    MITRAL VALVE:  Normal Ranges:  MV DT: 211 msec (150-240msec)    AORTIC VALVE:  Normal Ranges:  AoV Vmax:      1.37 m/s (<=1.7m/s)  AoV Peak P.5 mmHg (<20mmHg)  LVOT Max Lalo:  0.87 m/s (<=1.1m/s)  LVOT VTI:      15.20 cm  LVOT Diameter: 2.20 cm  (1.8-2.4cm)  AoV Area,Vmax: 2.41 cm2 (2.5-4.5cm2)    RIGHT VENTRICLE:  RV 1   3.46 cm  RV 2   2.99 cm  RV 3   6.80 cm  TAPSE: 23.0 mm  RV s'  0.18 m/s    TRICUSPID VALVE/RVSP:  Normal Ranges:  IVC Diam: 2.06 cm    PULMONIC VALVE:  Normal Ranges:  PV Max Lalo: 1.2 m/s  (0.6-0.9m/s)  PV Max P.7 mmHg      [id number] Carter Albert MD  Electronically signed on 2022 at 5:10:25 PM         Final     Stress Testing: No results found for this or any previous visit from the past 1825 days.    Cardiac Catheterization: No results found for this or any  previous visit from the past 1825 days.    Cardiac Scoring:   CT cardiac scoring wo IV contrast 10/09/2024    Addendum 10/11/2024 11:00 AM  Interpreted By:  Chad De La Torre,  ADDENDUM:  Technical: The following is to serve as an over-read for an  unenhanced cardiac CT, to evaluate the extravascular structures.    Contiguous unenhanced CT sections are performed from level the leah  to the upper abdomen.        Findings: The visualized portions of both lungs are clear.    There is no sign of pathologic lymph node enlargement. Few  nonenlarged lymph nodes are identified in the mediastinum in the  pretracheal and subcarinal space. There is no pericardial or pleural  effusion.    Images through the upper abdomen are unremarkable.    The visualized osseous structures are intact.        Impression: The extravascular structures have an unremarkable CT  appearance.    Signed by: Chad De La Torre 10/11/2024 11:00 AM    -------- ORIGINAL REPORT --------  Dictation workstation:   [id number]    Narrative  Interpreted By:  Chad Gutierrez,  STUDY:  CT CARDIAC SCORING WO IV CONTRAST; 10/9/2024 4:11 pm    INDICATION:  Signs/Symptoms:premature ASO.    COMPARISON:  None.    ACCESSION NUMBER(S):  [id number]    ORDERING CLINICIAN:  DOREEN THIBODEAUX    TECHNIQUE:  Using prospective ECG gating, CT scan of the coronary arteries was  performed without intravenous contrast. Coronary calcium scoring  was  performed according to the method of Agatston.    CT Dose-Length Product (DLP): 60.7 mGy*cm  CT Dose Reduction Employed: Yes, prospective gating, iterative  reconstruction.    FINDINGS:  The score and distribution of calcium in the coronary arteries is as  follows:    LM             0  LAD           0  LCx            0  RCA           0    Total         0    The visualized mid/lower ascending thoracic aorta measures 2.8 cm in  diameter. The heart is normal in size. No pericardial effusion is  present.    Impression  1. Coronary  "artery calcium score of  0*.    *Coronary artery calcium scoring may be helpful in predicting the  risk for future coronary heart disease events.  According to the  American College of Cardiology Foundation Clinical Expert Consensus  Task Force, such testing provides important prognostic information in  patients with more than one coronary heart disease risk factor. The  coronary artery calcium score correlates with the annual risk of a  non-fatal myocardial infarction or coronary heart disease death.    Coronary artery score            Annual Risk    0-99                               0.4%  100-399                          1.3%  >400                              2.4%    These three \"breakpoints\" correspond to lower, intermediate and high  risk states for future coronary events.  Such information should be  used, along with appropriate clinical judgment, to make decisions  regarding the intensity of risk factor management strategies to treat  blood lipids and to modify other non-lipid coronary risk factors.    Reference: Yo P et al. Circulation.  2007; 115:402-426    Reading Cardiologist: Dr. Chad Gutierrez, Date: 10/11/2024 10:41 am    Signed by: Chad Gutierrez 10/11/2024 10:41 AM  Dictation workstation:   [id number]    AAA : No results found for this or any previous visit from the past 1825 days.    OTHER: No results found for this or any previous visit from the past 1825 days.          LABS:  CBC with Differential:    Lab Results   Component Value Date    WBC 5.3 08/20/2024    RBC 4.31 08/20/2024    HGB 13.6 08/20/2024    HCT 42.3 08/20/2024     08/20/2024    MCV 98 08/20/2024    MCH 31.6 08/20/2024    MCHC 32.2 08/20/2024    RDW 12.9 08/20/2024    NRBC 0.0 08/20/2024    LYMPHOPCT 30.4 08/20/2024    MONOPCT 11.0 08/20/2024    EOSPCT 2.1 08/20/2024    BASOPCT 0.8 08/20/2024    MONOSABS 0.58 08/20/2024    LYMPHSABS 1.60 08/20/2024    EOSABS 0.11 08/20/2024    BASOSABS 0.04 08/20/2024     CMP:    Lab " "Results   Component Value Date     08/20/2024    K 5.0 08/20/2024     08/20/2024    CO2 27 08/20/2024    BUN 12 08/20/2024    CREATININE 0.86 08/20/2024    GLUCOSE 87 08/20/2024    PROT 7.2 01/02/2025    CALCIUM 9.9 08/20/2024    BILITOT 0.5 01/02/2025    ALKPHOS 87 01/02/2025    AST 17 01/02/2025    ALT 19 01/02/2025     BMP:    Lab Results   Component Value Date     08/20/2024    K 5.0 08/20/2024     08/20/2024    CO2 27 08/20/2024    BUN 12 08/20/2024    CREATININE 0.86 08/20/2024    CALCIUM 9.9 08/20/2024    GLUCOSE 87 08/20/2024     Magnesium:  Lab Results   Component Value Date    MG 1.95 06/21/2024     Troponin:  No results found for: \"TROPHS\"  BNP: No results found for: \"BNP\"      Lipid Panel:  Lab Results   Component Value Date    HDL 77.9 01/02/2025    CHHDL 2.6 01/02/2025    VLDL 14 01/02/2025    TRIG 70 01/02/2025    NHDL 124 01/02/2025        Lab work and imaging results independently reviewed by me         Vascular Physical Exam  Constitutional:       General: She is awake.      Appearance: She is well-developed.   HENT:      Head: Normocephalic and atraumatic.      Nose: Nose normal.      Mouth/Throat:      Mouth: Mucous membranes are dry.   Eyes:      General: Vision grossly intact.      Pupils: Pupils are equal, round, and reactive to light.   Cardiovascular:      Rate and Rhythm: Normal rate.      Pulses:           Radial pulses are 2+ on the right side and 2+ on the left side.        Femoral pulses are 2+ on the right side and 2+ on the left side.       Dorsalis pedis pulses are non-palpable on the right side and non-palpable on the left side.          Posterior tibial pulses are non-palpable on the right side and 2+ on the left side.    Pulmonary:      Effort: Pulmonary effort is normal.   Abdominal:      General: Abdomen is protuberant.      Palpations: Abdomen is soft.   Musculoskeletal:      Neck: Full passive range of motion without pain and normal range of motion. "   Skin:     General: Skin is warm and dry.   Neurological:      General: No focal deficit present.      Mental Status: She is alert and oriented to person, place, and time.   Psychiatric:         Mood and Affect: Mood normal.         Behavior: Behavior normal.           Diagnoses and all orders for this visit:  PAD (peripheral artery disease) (CMS-[facility])  -     enoxaparin (Lovenox) 80 mg/0.8 mL syringe; Inject 0.8 mL (80 mg) under the skin every 12 hours for 14 days.  -     Case Request Cath Lab: Lower Extremity Intervention  -     Basic Metabolic Panel; Future  -     CBC; Future  -     Protime-INR; Future  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 (Multi)  -     enoxaparin (Lovenox) 80 mg/0.8 mL syringe; Inject 0.8 mL (80 mg) under the skin every 12 hours for 14 days.  -     Case Request Cath Lab: Lower Extremity Intervention  -     Basic Metabolic Panel; Future  -     CBC; Future  -     Protime-INR; Future            Lisset Cash MD       Orders:  Orders Placed This Encounter   Procedures    Basic Metabolic Panel    CBC    Protime-INR         Followup Appts:  Future Appointments   Date Time Provider Department Center   3/7/2025  1:00 PM INF 01 PARM OPCTR PAROPCINF [place]   3/12/2025  9:00 AM Stephani Quiroga MD [id number] [place]   3/26/2025  1:00 PM Cole C Budinsky, MD [id number] [place]   3/27/2025  2:00 PM ANTICOAG [facility] [id number] COAG CLINIC [id number] [place]   6/2/2025  1:00 PM [facility]

## 2025-03-04 NOTE — TELEPHONE ENCOUNTER
Spoke with patient while in office.  Patient scheduled for surgery on 3/13/25.  Instructed to stop coumadin after dose on Sunday and start Lovenox injection on Monday am.  Instructed to have blood work done by early next week and to hold am meds the morning of surgery.  Patient voiced understanding.

## 2025-03-04 NOTE — H&P (VIEW-ONLY)
Primary Care Physician: Stephan Martins MD  Primary Cardiologist:       Date of Visit: 03/04/2025  2:15 PM EST  Location of visit: [facility] PHYSICIAN MART[facility]NE[facility]   Type of Visit: Follow up             Chief Complaint   Patient presents with    Follow-up     1 year follow up with testing.         HPI / Summary:   Angle Martins is a 64 y.o. female  with    who returns for routine follow up   She reports continued rest pain of her forefoot including discomfort and pain with cold exposure and not necessarily related to walking.  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  She has pulsatile signals at the digital level on the right foot but they are moderately dampened.  Her toe brachial index 0.64.  She does have this problem with thromboembolism.  She is on Coumadin.    I feel that she has not made further progress in terms of her neuropathy and any positive improvement in flow may help reverse her situation of forefoot pain and discomfort.  I am going to set her up for an angiogram and intervention as needed.  I will asked that she start taking Lovenox injections 3 days before her procedure and stop her Coumadin and then continue her Lovenox afterwards for up to 3 to 4 days while resuming her Coumadin and having her INR rechecked.  The risk and rationale for arteriogram and intervention was discussed and the patient is agreeable to proceeding.    12 system review is negative except as noted above        Medical History:   Past Medical History:   Diagnosis Date    COVID-19     COVID    Personal history of other venous thrombosis and embolism     History of deep venous thrombosis       Social History:   Tobacco Use: Medium Risk (3/4/2025)    Patient History     Smoking Tobacco Use: Former     Smokeless Tobacco Use: Never     Passive Exposure: Not on file         MEDICATIONS:   Current Outpatient Medications   Medication Instructions    alpha lipoic acid 600 mg capsule 1 capsule,  2 times daily    aspirin 81 mg, Every morning    buPROPion XL (Wellbutrin XL) 150 mg 24 hr tablet Please take 1 tablet by mouth with BREAKFAST every morning.  Do not crush, chew, or split.    cholecalciferol (Vitamin D-3) 50,000 unit capsule TAKE 1 CAPSULE Weekly SUNDAYS PLEASE with food and full glass water. Thank you    cyanocobalamin (Vitamin B-12) 100 mcg tablet Please take 1 tablet by mouth with LUNCH every day.  Thank you.    cyclobenzaprine (FLEXERIL) 10 mg, oral, Nightly PRN    dilTIAZem XR (DILACOR XR) 120 mg, oral, Daily, Take 1 capsule once daily.    DULoxetine (CYMBALTA) 60 mg, oral, Daily, Do not crush or chew.    enoxaparin (LOVENOX) 80 mg, subcutaneous, Every 12 hours    famotidine (Pepcid) 20 mg tablet Please take 1 tablet by mouth before breakfast time, and again 1 tablet by mouth before suppertime.  Please take twice a day even if you are not going to eat.  Thank you.    folic acid (Folvite) 1 mg tablet Please take 1 tablet by mouth with lunch every day.  Thank you.    gabapentin (NEURONTIN) 600 mg, oral, 3 times daily    ipratropium-albuteroL (Duo-Neb) 0.5-2.5 mg/3 mL nebulizer solution 3 mL, 3 times daily PRN    ketamine HCl in 0.9 % NaCl (ketamine in 0.9 % sod chloride) 10 mg/mL solution Infuse into a venous catheter.    melatonin 5 mg tablet Please take 1 tablet by mouth after supper every evening.  Watch out for daytime sleepiness.  No driving if drowsy.  Thank you.    naloxone (NARCAN) 4 mg, nasal, As needed, May repeat every 2-3 minutes if needed, alternating nostrils, until medical assistance becomes available.    ondansetron ODT (Zofran-ODT) 4 mg disintegrating tablet Please melt on tongue every 6-8 hours as needed for nausea.  Lots of fluids throughout the day.  Thank you.    [START ON 3/9/2025] oxyCODONE (ROXICODONE) 7.5 mg, oral, 2 times daily PRN    pantoprazole (PROTONIX) 40 mg, Every morning    polyethylene glycol (Glycolax, Miralax) 17 gram packet 0.5 packets, Daily PRN    Repatha  SureClick 140 mg, subcutaneous, Every 14 days    warfarin (COUMADIN) 3 mg, oral, Nightly, Take as directed per After Visit Summary. 3 mg on Sunday, Monday, Tuesday and Friday. 4.5mg on Wednesday and Sunday.         IMAGING REVIEWED:   Echocardiogram:   Echocardiogram     Narrative  [facility], [address] [place] [address]  Tel [phone] and Fax [phone]    TRANSTHORACIC ECHOCARDIOGRAM REPORT      Patient Name:     STARLA FRANKS     Jacquelyn Physician:  [id number] Carter Albert MD  Study Date:       12/9/2022          Referring           STANLEY GALLAGHER  Physician:  MRN/PID:          [id number]           PCP:  Accession/Order#: [id number]          [facility]  Location:  YOB: 1960           Fellow:             [id number] Lowell Acosta MD  Gender:           F                  Nurse:  Admit Date:       12/8/2022          Sonographer:        ALKA Shepherd RDCS  Admission Status: Inpatient -        Additional Staff:  Routine  Height:           162.56 cm          CC Report to:       [facility]  Weight:           88.91 kg           Study Type:         Echocardiogram  BSA:              1.94 m2  Blood Pressure: 149 /63 mmHg    Diagnosis/ICD: Z13.6-Encounter for screening for cardiovascular disorders  Indication:    Hypercoagulable state  Procedure/CPT: Echo Complete w Full Doppler-[id number]    Patient History:  Pertinent History: HTN, Hyperlipidemia, TIA and Previous DVT. covid recovered,  hypoxic respiratory failure.    Study Detail: The following Echo studies were performed: 2D, M-Mode, color flow  and Doppler. Technically challenging study due to patient lying in  supine position. Agitated saline used as a contrast agent for  intraseptal flow evaluation.      PHYSICIAN INTERPRETATION:  Left Ventricle: The left ventricular systolic function is normal, with an estimated ejection fraction of 65-70%. There are no regional wall motion abnormalities.  The left ventricular cavity size is normal. There is moderately increased left ventricular posterior wall thickness. There is mild concentric left ventricular hypertrophy. Spectral Doppler shows a normal pattern of left ventricular diastolic filling.  Left Atrium: The left atrium is normal in size. There is no evidence of a patent foramen ovale. A bubble study using agitated saline was performed. Bubble study is negative.  Right Ventricle: The right ventricle is normal in size. There is normal right ventricular global systolic function.  Right Atrium: The right atrium is normal in size.  Aortic Valve: The aortic valve is trileaflet. There is minimal aortic valve cusp calcification. There is no evidence of aortic valve regurgitation. The peak instantaneous gradient of the aortic valve is 7.5 mmHg.  Mitral Valve: The mitral valve is normal in structure. There is trace mitral valve regurgitation.  Tricuspid Valve: The tricuspid valve is structurally normal. No evidence of tricuspid regurgitation. The right ventricular systolic pressure is unable to be estimated.  Pulmonic Valve: The pulmonic valve is not well visualized. There is no indication of pulmonic valve regurgitation.  Pericardium: There is no pericardial effusion noted.  Aorta: The aortic root is normal.  Systemic Veins: The inferior vena cava appears to be of normal size.  In comparison to the previous echocardiogram(s): There are no prior studies on this patient for comparison purposes.      CONCLUSIONS:  1. Left ventricular systolic function is normal with a 65-70% estimated ejection fraction.  2. Poorly visualized anatomical structures due to suboptimal image quality.  3. The left ventricular posterior wall thickness is moderately increased.  4. There is no evidence of a patent foramen ovale.    QUANTITATIVE DATA SUMMARY:  2D MEASUREMENTS:  Normal Ranges:  Ao Root d:     2.70 cm   (2.0-3.7cm)  LAs:           3.00 cm   (2.7-4.0cm)  IVSd:          0.70 cm    (0.6-1.1cm)  LVPWd:         1.50 cm   (0.6-1.1cm)  LVIDd:         4.70 cm   (3.9-5.9cm)  LVIDs:         3.40 cm  LV Mass Index: 96.7 g/m2  LV % FS        27.7 %    LA VOLUME:  Normal Ranges:  LA Vol A4C:        48.3 ml    (22+/-6mL/m2)  LA Vol A2C:        44.0 ml  LA Vol BP:         46.4 ml  LA Vol Index A4C:  24.9ml/m2  LA Vol Index A2C:  22.7 ml/m2  LA Vol Index BP:   23.9 ml/m2  LA Area A4C:       17.5 cm2  LA Area A2C:       16.6 cm2  LA Major Axis A4C: 5.4 cm  LA Major Axis A2C: 5.3 cm  LA Volume Index:   23.9 ml/m2    RA VOLUME BY A/L METHOD:  Normal Ranges:  RA Area A4C: 10.9 cm2    AORTA MEASUREMENTS:  Normal Ranges:  Asc Ao, d: 2.50 cm (2.1-3.4cm)    LV SYSTOLIC FUNCTION BY 2D PLANIMETRY (MOD):  Normal Ranges:  EF-A4C View: 77.9 % (>=55%)  EF-A2C View: 65.0 %  EF-Biplane:  71.9 %    LV DIASTOLIC FUNCTION:  Normal Ranges:  MV Peak E:    0.56 m/s    (0.7-1.2 m/s)  MV Peak A:    0.65 m/s    (0.42-0.7 m/s)  E/A Ratio:    0.86        (1.0-2.2)  MV e'         0.08 m/s    (>8.0)  MV lateral e' 0.11 m/s  MV medial e'  0.06 m/s  MV A Dur:     119.00 msec  E/e' Ratio:   6.55        (<8.0)  MV DT:        211 msec    (150-240 msec)    MITRAL VALVE:  Normal Ranges:  MV DT: 211 msec (150-240msec)    AORTIC VALVE:  Normal Ranges:  AoV Vmax:      1.37 m/s (<=1.7m/s)  AoV Peak P.5 mmHg (<20mmHg)  LVOT Max Lalo:  0.87 m/s (<=1.1m/s)  LVOT VTI:      15.20 cm  LVOT Diameter: 2.20 cm  (1.8-2.4cm)  AoV Area,Vmax: 2.41 cm2 (2.5-4.5cm2)    RIGHT VENTRICLE:  RV 1   3.46 cm  RV 2   2.99 cm  RV 3   6.80 cm  TAPSE: 23.0 mm  RV s'  0.18 m/s    TRICUSPID VALVE/RVSP:  Normal Ranges:  IVC Diam: 2.06 cm    PULMONIC VALVE:  Normal Ranges:  PV Max Lalo: 1.2 m/s  (0.6-0.9m/s)  PV Max P.7 mmHg      [id number] Carter Albert MD  Electronically signed on 2022 at 5:10:25 PM         Final     Stress Testing: No results found for this or any previous visit from the past 1825 days.    Cardiac Catheterization: No results found for this or any  previous visit from the past 1825 days.    Cardiac Scoring:   CT cardiac scoring wo IV contrast 10/09/2024    Addendum 10/11/2024 11:00 AM  Interpreted By:  Chad De La Torre,  ADDENDUM:  Technical: The following is to serve as an over-read for an  unenhanced cardiac CT, to evaluate the extravascular structures.    Contiguous unenhanced CT sections are performed from level the leah  to the upper abdomen.        Findings: The visualized portions of both lungs are clear.    There is no sign of pathologic lymph node enlargement. Few  nonenlarged lymph nodes are identified in the mediastinum in the  pretracheal and subcarinal space. There is no pericardial or pleural  effusion.    Images through the upper abdomen are unremarkable.    The visualized osseous structures are intact.        Impression: The extravascular structures have an unremarkable CT  appearance.    Signed by: Chad De La Torre 10/11/2024 11:00 AM    -------- ORIGINAL REPORT --------  Dictation workstation:   [id number]    Narrative  Interpreted By:  Chad Gutierrez,  STUDY:  CT CARDIAC SCORING WO IV CONTRAST; 10/9/2024 4:11 pm    INDICATION:  Signs/Symptoms:premature ASO.    COMPARISON:  None.    ACCESSION NUMBER(S):  [id number]    ORDERING CLINICIAN:  DOREEN THIBODEAUX    TECHNIQUE:  Using prospective ECG gating, CT scan of the coronary arteries was  performed without intravenous contrast. Coronary calcium scoring  was  performed according to the method of Agatston.    CT Dose-Length Product (DLP): 60.7 mGy*cm  CT Dose Reduction Employed: Yes, prospective gating, iterative  reconstruction.    FINDINGS:  The score and distribution of calcium in the coronary arteries is as  follows:    LM             0  LAD           0  LCx            0  RCA           0    Total         0    The visualized mid/lower ascending thoracic aorta measures 2.8 cm in  diameter. The heart is normal in size. No pericardial effusion is  present.    Impression  1. Coronary  "artery calcium score of  0*.    *Coronary artery calcium scoring may be helpful in predicting the  risk for future coronary heart disease events.  According to the  American College of Cardiology Foundation Clinical Expert Consensus  Task Force, such testing provides important prognostic information in  patients with more than one coronary heart disease risk factor. The  coronary artery calcium score correlates with the annual risk of a  non-fatal myocardial infarction or coronary heart disease death.    Coronary artery score            Annual Risk    0-99                               0.4%  100-399                          1.3%  >400                              2.4%    These three \"breakpoints\" correspond to lower, intermediate and high  risk states for future coronary events.  Such information should be  used, along with appropriate clinical judgment, to make decisions  regarding the intensity of risk factor management strategies to treat  blood lipids and to modify other non-lipid coronary risk factors.    Reference: Yo P et al. Circulation.  2007; 115:402-426    Reading Cardiologist: Dr. Chad Gutierrez, Date: 10/11/2024 10:41 am    Signed by: Chad Gutierrez 10/11/2024 10:41 AM  Dictation workstation:   [id number]    AAA : No results found for this or any previous visit from the past 1825 days.    OTHER: No results found for this or any previous visit from the past 1825 days.          LABS:  CBC with Differential:    Lab Results   Component Value Date    WBC 5.3 08/20/2024    RBC 4.31 08/20/2024    HGB 13.6 08/20/2024    HCT 42.3 08/20/2024     08/20/2024    MCV 98 08/20/2024    MCH 31.6 08/20/2024    MCHC 32.2 08/20/2024    RDW 12.9 08/20/2024    NRBC 0.0 08/20/2024    LYMPHOPCT 30.4 08/20/2024    MONOPCT 11.0 08/20/2024    EOSPCT 2.1 08/20/2024    BASOPCT 0.8 08/20/2024    MONOSABS 0.58 08/20/2024    LYMPHSABS 1.60 08/20/2024    EOSABS 0.11 08/20/2024    BASOSABS 0.04 08/20/2024     CMP:    Lab " "Results   Component Value Date     08/20/2024    K 5.0 08/20/2024     08/20/2024    CO2 27 08/20/2024    BUN 12 08/20/2024    CREATININE 0.86 08/20/2024    GLUCOSE 87 08/20/2024    PROT 7.2 01/02/2025    CALCIUM 9.9 08/20/2024    BILITOT 0.5 01/02/2025    ALKPHOS 87 01/02/2025    AST 17 01/02/2025    ALT 19 01/02/2025     BMP:    Lab Results   Component Value Date     08/20/2024    K 5.0 08/20/2024     08/20/2024    CO2 27 08/20/2024    BUN 12 08/20/2024    CREATININE 0.86 08/20/2024    CALCIUM 9.9 08/20/2024    GLUCOSE 87 08/20/2024     Magnesium:  Lab Results   Component Value Date    MG 1.95 06/21/2024     Troponin:  No results found for: \"TROPHS\"  BNP: No results found for: \"BNP\"      Lipid Panel:  Lab Results   Component Value Date    HDL 77.9 01/02/2025    CHHDL 2.6 01/02/2025    VLDL 14 01/02/2025    TRIG 70 01/02/2025    NHDL 124 01/02/2025        Lab work and imaging results independently reviewed by me         Vascular Physical Exam  Constitutional:       General: She is awake.      Appearance: She is well-developed.   HENT:      Head: Normocephalic and atraumatic.      Nose: Nose normal.      Mouth/Throat:      Mouth: Mucous membranes are dry.   Eyes:      General: Vision grossly intact.      Pupils: Pupils are equal, round, and reactive to light.   Cardiovascular:      Rate and Rhythm: Normal rate.      Pulses:           Radial pulses are 2+ on the right side and 2+ on the left side.        Femoral pulses are 2+ on the right side and 2+ on the left side.       Dorsalis pedis pulses are non-palpable on the right side and non-palpable on the left side.          Posterior tibial pulses are non-palpable on the right side and 2+ on the left side.    Pulmonary:      Effort: Pulmonary effort is normal.   Abdominal:      General: Abdomen is protuberant.      Palpations: Abdomen is soft.   Musculoskeletal:      Neck: Full passive range of motion without pain and normal range of motion. "   Skin:     General: Skin is warm and dry.   Neurological:      General: No focal deficit present.      Mental Status: She is alert and oriented to person, place, and time.   Psychiatric:         Mood and Affect: Mood normal.         Behavior: Behavior normal.           Diagnoses and all orders for this visit:  PAD (peripheral artery disease) (CMS-[facility])  -     enoxaparin (Lovenox) 80 mg/0.8 mL syringe; Inject 0.8 mL (80 mg) under the skin every 12 hours for 14 days.  -     Case Request Cath Lab: Lower Extremity Intervention  -     Basic Metabolic Panel; Future  -     CBC; Future  -     Protime-INR; Future  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 (Multi)  -     enoxaparin (Lovenox) 80 mg/0.8 mL syringe; Inject 0.8 mL (80 mg) under the skin every 12 hours for 14 days.  -     Case Request Cath Lab: Lower Extremity Intervention  -     Basic Metabolic Panel; Future  -     CBC; Future  -     Protime-INR; Future            Lisset Cash MD       Orders:  Orders Placed This Encounter   Procedures    Basic Metabolic Panel    CBC    Protime-INR         Followup Appts:  Future Appointments   Date Time Provider Department Center   3/7/2025  1:00 PM INF 01 PARM OPCTR PAROPCINF [place]   3/12/2025  9:00 AM Stephani Qiuroga MD [id number] [place]   3/26/2025  1:00 PM Cole C Budinsky, MD [id number] [place]   3/27/2025  2:00 PM ANTICOAG [facility] [id number] CARD1 COAG CLINIC [id number] [place]   6/2/2025  1:00 PM [facility]

## 2025-03-05 DIAGNOSIS — U09.9 POST-COVID CHRONIC COUGH: ICD-10-CM

## 2025-03-05 DIAGNOSIS — R29.818 NEUROGENIC CLAUDICATION: ICD-10-CM

## 2025-03-05 DIAGNOSIS — M54.16 LUMBAR RADICULAR PAIN: Primary | ICD-10-CM

## 2025-03-05 DIAGNOSIS — R05.3 POST-COVID CHRONIC COUGH: ICD-10-CM

## 2025-03-05 RX ORDER — KETAMINE HCL IN NACL, ISO-OSM 100MG/10ML
SYRINGE (ML) INJECTION ONCE
Status: CANCELLED | OUTPATIENT
Start: 2025-03-07

## 2025-03-05 RX ORDER — KETOROLAC TROMETHAMINE 30 MG/ML
30 INJECTION, SOLUTION INTRAMUSCULAR; INTRAVENOUS ONCE
Status: CANCELLED | OUTPATIENT
Start: 2025-03-07 | End: 2025-03-07

## 2025-03-07 ENCOUNTER — INFUSION (OUTPATIENT)
Dept: INFUSION THERAPY | Facility: CLINIC | Age: 65
End: 2025-03-07
Payer: MEDICARE

## 2025-03-07 VITALS
HEART RATE: 76 BPM | TEMPERATURE: 98.2 F | RESPIRATION RATE: 17 BRPM | OXYGEN SATURATION: 99 % | SYSTOLIC BLOOD PRESSURE: 153 MMHG | DIASTOLIC BLOOD PRESSURE: 67 MMHG

## 2025-03-07 DIAGNOSIS — M54.16 LUMBAR RADICULAR PAIN: ICD-10-CM

## 2025-03-07 DIAGNOSIS — R05.3 POST-COVID CHRONIC COUGH: ICD-10-CM

## 2025-03-07 DIAGNOSIS — R29.818 NEUROGENIC CLAUDICATION: ICD-10-CM

## 2025-03-07 DIAGNOSIS — U09.9 POST-COVID CHRONIC COUGH: ICD-10-CM

## 2025-03-07 LAB
1OH-MIDAZOLAM UR-MCNC: NEGATIVE NG/ML
7AMINOCLONAZEPAM UR-MCNC: NEGATIVE NG/ML
A-OH ALPRAZ UR-MCNC: NEGATIVE NG/ML
A-OH-TRIAZOLAM UR-MCNC: NEGATIVE NG/ML
AMPHETAMINES UR QL: NEGATIVE NG/ML
BARBITURATES UR QL: NEGATIVE NG/ML
BZE UR QL: NEGATIVE NG/ML
CODEINE UR-MCNC: NEGATIVE NG/ML
CREAT UR-MCNC: 148.2 MG/DL
DRUG SCREEN COMMENT UR-IMP: ABNORMAL
EDDP UR-MCNC: NEGATIVE NG/ML
FENTANYL UR-MCNC: NEGATIVE NG/ML
HYDROCODONE UR-MCNC: NEGATIVE NG/ML
HYDROMORPHONE UR-MCNC: NEGATIVE NG/ML
LORAZEPAM UR-MCNC: NEGATIVE NG/ML
METHADONE UR-MCNC: NEGATIVE NG/ML
MORPHINE UR-MCNC: NEGATIVE NG/ML
NORDIAZEPAM UR-MCNC: NEGATIVE NG/ML
NORFENTANYL UR-MCNC: NEGATIVE NG/ML
NORHYDROCODONE UR CFM-MCNC: NEGATIVE NG/ML
NOROXYCODONE UR CFM-MCNC: 7570 NG/ML
NORTRAMADOL UR-MCNC: NEGATIVE NG/ML
OH-ETHYLFLURAZ UR-MCNC: NEGATIVE NG/ML
OXAZEPAM UR-MCNC: NEGATIVE NG/ML
OXIDANTS UR QL: NEGATIVE MCG/ML
OXYCODONE UR CFM-MCNC: 5520 NG/ML
OXYMORPHONE UR CFM-MCNC: 1630 NG/ML
PCP UR QL: NEGATIVE NG/ML
PH UR: 5.7 [PH] (ref 4.5–9)
QUEST 6 ACETYLMORPHINE: NEGATIVE NG/ML
QUEST NOTES AND COMMENTS: ABNORMAL
QUEST ZOLPIDEM: NEGATIVE NG/ML
TEMAZEPAM UR-MCNC: NEGATIVE NG/ML
THC UR QL: NEGATIVE NG/ML
TRAMADOL UR-MCNC: NEGATIVE NG/ML
ZOLPIDEM PHENYL-4-CARB UR CFM-MCNC: NEGATIVE NG/ML

## 2025-03-07 PROCEDURE — 2500000004 HC RX 250 GENERAL PHARMACY W/ HCPCS (ALT 636 FOR OP/ED): Performed by: NURSE PRACTITIONER

## 2025-03-07 PROCEDURE — 96365 THER/PROPH/DIAG IV INF INIT: CPT | Mod: INF

## 2025-03-07 PROCEDURE — 96368 THER/DIAG CONCURRENT INF: CPT | Mod: INF

## 2025-03-07 PROCEDURE — 96375 TX/PRO/DX INJ NEW DRUG ADDON: CPT | Mod: INF

## 2025-03-07 RX ORDER — NITROGLYCERIN 0.4 MG/1
0.4 TABLET SUBLINGUAL ONCE
OUTPATIENT
Start: 2025-04-06 | End: 2025-04-06

## 2025-03-07 RX ORDER — DIPHENHYDRAMINE HYDROCHLORIDE 50 MG/ML
50 INJECTION INTRAMUSCULAR; INTRAVENOUS AS NEEDED
OUTPATIENT
Start: 2025-04-06

## 2025-03-07 RX ORDER — FAMOTIDINE 10 MG/ML
20 INJECTION, SOLUTION INTRAVENOUS ONCE AS NEEDED
OUTPATIENT
Start: 2025-04-06

## 2025-03-07 RX ORDER — KETOROLAC TROMETHAMINE 30 MG/ML
30 INJECTION, SOLUTION INTRAMUSCULAR; INTRAVENOUS ONCE
OUTPATIENT
Start: 2025-04-06 | End: 2025-04-06

## 2025-03-07 RX ORDER — EPINEPHRINE 0.3 MG/.3ML
0.3 INJECTION SUBCUTANEOUS EVERY 5 MIN PRN
OUTPATIENT
Start: 2025-04-06

## 2025-03-07 RX ORDER — KETAMINE HCL IN NACL, ISO-OSM 100MG/10ML
SYRINGE (ML) INJECTION ONCE
OUTPATIENT
Start: 2025-04-06

## 2025-03-07 RX ORDER — ALBUTEROL SULFATE 0.83 MG/ML
3 SOLUTION RESPIRATORY (INHALATION) AS NEEDED
OUTPATIENT
Start: 2025-04-06

## 2025-03-07 RX ORDER — KETOROLAC TROMETHAMINE 30 MG/ML
30 INJECTION, SOLUTION INTRAMUSCULAR; INTRAVENOUS ONCE
Status: COMPLETED | OUTPATIENT
Start: 2025-03-07 | End: 2025-03-07

## 2025-03-07 RX ORDER — ONDANSETRON HYDROCHLORIDE 2 MG/ML
4 INJECTION, SOLUTION INTRAVENOUS ONCE
OUTPATIENT
Start: 2025-04-06 | End: 2025-04-06

## 2025-03-07 RX ORDER — KETAMINE HCL IN NACL, ISO-OSM 100MG/10ML
SYRINGE (ML) INJECTION ONCE
Status: COMPLETED | OUTPATIENT
Start: 2025-03-07 | End: 2025-03-07

## 2025-03-07 RX ADMIN — PROPOFOL 100 MG: 10 INJECTION, EMULSION INTRAVENOUS at 13:31

## 2025-03-07 RX ADMIN — Medication: at 13:31

## 2025-03-07 RX ADMIN — KETOROLAC TROMETHAMINE 30 MG: 30 INJECTION, SOLUTION INTRAMUSCULAR at 13:31

## 2025-03-07 ASSESSMENT — ENCOUNTER SYMPTOMS
DEPRESSION: 0
OCCASIONAL FEELINGS OF UNSTEADINESS: 0
LOSS OF SENSATION IN FEET: 0

## 2025-03-07 ASSESSMENT — PAIN SCALES - GENERAL
PAINLEVEL_OUTOF10: 2
PAINLEVEL_OUTOF10: 0 - NO PAIN

## 2025-03-07 NOTE — PROGRESS NOTES
S: Patient here for 20th opioid sparing pain infusion. Patient reports 65-70% reduction in pain after last infusion that lasted 3 weeks.    Purpose of pain infusion meds explained along with potential side effects.  Patient verbalized understanding.    B: Pain Issues: rt leg, knee to toes  2/10    A: Patient currently has pain described on flow sheet documentation. Designated  is  Mic. Patient last ate solid food 12 hours ago, and had liquid 1 hours ago.    R: Plan; Obtain IV access, do patient risk assessment, and start opioid sparing infusion as ordered. Monitoring for S/S of adverse reactions.

## 2025-03-07 NOTE — PATIENT INSTRUCTIONS
Today :We administered ketamine 30 mg-lidocaine 300 mg, propofol (Diprivan), and ketorolac.     For:   1. Lumbar radicular pain    2. Neurogenic claudication    3. Post-COVID chronic cough            (Tell all doctors including dentists that you are taking this medication)     Go to the emergency room or call 911 if:  -You have signs of allergic reaction:   -Rash, hives, itching.   -Swollen, blistered, peeling skin.   -Swelling of face, lips, mouth, tongue or throat.   -Tightness of chest, trouble breathing, swallowing or talking     Call your doctor:  - If IV / injection site gets red, warm, swollen, itchy or leaks fluid or pus.     (Leave dressing on your IV site for at least 2 hours and keep area clean and dry  - If you get sick or have symptoms of infection or are not feeling well for any reason.    (Wash your hands often, stay away from people who are sick)  - If you have side effects from your medication that do not go away or are bothersome.     (Refer to the teaching your nurse gave you for side effects to call your doctor about)    - Common side effects may include:  stuffy nose, headache, feeling tired, muscle aches, upset stomach  - Before receiving any vaccines     - Call [facility] at   If:  - You get sick, are on antibiotics, have had a recent vaccine, have surgery or dental work and your doctor wants your visit rescheduled.  - You need to cancel and reschedule your visit for any reason. Call at least 2 days before your visit if you need to cancel.   - Your insurance changes before your next visit.    (We will need to get approval from your new insurance. This can take up to two weeks.)     [facility] is opened Monday thru Friday. We are closed on weekends and holidays.   Voice mail will take your call if the center is closed. If you leave a message please allow 24 hours for a call back during weekdays. If you leave a message on a weekend/holiday, we will call you back the  next business day.    A pharmacist is available Monday - Friday from 8:30AM to 3:30PM to help answer any questions you may have about your prescriptions(s). Please call pharmacy at:    [facility]: [phone]  [facility]: [phone]  [facility]: [phone]              [facility]      Pain Infusion Aftercare Instructions      1. It is normal to feel sedated, tired and low in energy after a pain infusion. DO NOT DRIVE, OPERATE ANY MACHINERY, OR MAKE ANY IMPORTANT DECISIONS FOR AT LEAST 24 HOURS AFTER THE INFUSION.     2. Call the pain center at [phone] with any problems, questions, or concerns.     3. Eat light after the infusion. If you feel queasy or sick to your stomach, laying down with your eyes closed may help. When you resume eating start with something mild like clear liquids, yogurt, applesauce, crackers, etc… Gradually advance to a regular diet.     4. Do not leave your house alone the evening of your pain infusion.     5. No alcohol or sedative medications, such as sleeping pills, for 24 hours after your pain infusion.     6. Resume all other prescribed medications unless directed otherwise by you physician.     7. If you have any medical emergencies, call 911 or go directly to the closest emergency room.

## 2025-03-08 ENCOUNTER — LAB (OUTPATIENT)
Dept: LAB | Facility: HOSPITAL | Age: 65
End: 2025-03-08
Payer: MEDICARE

## 2025-03-08 LAB
ANION GAP SERPL CALC-SCNC: 8 MMOL/L (ref 10–20)
BUN SERPL-MCNC: 12 MG/DL (ref 6–23)
CALCIUM SERPL-MCNC: 9.4 MG/DL (ref 8.6–10.3)
CHLORIDE SERPL-SCNC: 106 MMOL/L (ref 98–107)
CO2 SERPL-SCNC: 29 MMOL/L (ref 21–32)
CREAT SERPL-MCNC: 0.83 MG/DL (ref 0.5–1.05)
EGFRCR SERPLBLD CKD-EPI 2021: 80 ML/MIN/1.73M*2
ERYTHROCYTE [DISTWIDTH] IN BLOOD BY AUTOMATED COUNT: 12.7 % (ref 11.5–14.5)
GLUCOSE SERPL-MCNC: 89 MG/DL (ref 74–99)
HCT VFR BLD AUTO: 38.9 % (ref 36–46)
HGB BLD-MCNC: 13.2 G/DL (ref 12–16)
INR PPP: 2.3 (ref 0.9–1.1)
MCH RBC QN AUTO: 32.1 PG (ref 26–34)
MCHC RBC AUTO-ENTMCNC: 33.9 G/DL (ref 32–36)
MCV RBC AUTO: 95 FL (ref 80–100)
NRBC BLD-RTO: 0 /100 WBCS (ref 0–0)
PLATELET # BLD AUTO: 232 X10*3/UL (ref 150–450)
POTASSIUM SERPL-SCNC: 4.3 MMOL/L (ref 3.5–5.3)
PROTHROMBIN TIME: 25.4 SECONDS (ref 9.8–12.4)
RBC # BLD AUTO: 4.11 X10*6/UL (ref 4–5.2)
SODIUM SERPL-SCNC: 139 MMOL/L (ref 136–145)
WBC # BLD AUTO: 5.1 X10*3/UL (ref 4.4–11.3)

## 2025-03-08 PROCEDURE — 80048 BASIC METABOLIC PNL TOTAL CA: CPT

## 2025-03-08 PROCEDURE — 85610 PROTHROMBIN TIME: CPT

## 2025-03-08 PROCEDURE — 85027 COMPLETE CBC AUTOMATED: CPT

## 2025-03-12 ENCOUNTER — OFFICE VISIT (OUTPATIENT)
Dept: CARDIOLOGY | Facility: CLINIC | Age: 65
End: 2025-03-12
Payer: MEDICARE

## 2025-03-12 VITALS
SYSTOLIC BLOOD PRESSURE: 124 MMHG | BODY MASS INDEX: 28.22 KG/M2 | DIASTOLIC BLOOD PRESSURE: 66 MMHG | HEART RATE: 85 BPM | WEIGHT: 167 LBS

## 2025-03-12 DIAGNOSIS — Z78.9 STATIN INTOLERANCE: ICD-10-CM

## 2025-03-12 DIAGNOSIS — E78.00 PURE HYPERCHOLESTEROLEMIA: ICD-10-CM

## 2025-03-12 DIAGNOSIS — I73.9 PAD (PERIPHERAL ARTERY DISEASE) (CMS-HCC): Primary | ICD-10-CM

## 2025-03-12 DIAGNOSIS — E78.41 ELEVATED LIPOPROTEIN(A): ICD-10-CM

## 2025-03-12 DIAGNOSIS — I74.3 ARTERIAL EMBOLISM AND THROMBOSIS OF LOWER EXTREMITY (MULTI): ICD-10-CM

## 2025-03-12 PROCEDURE — 99214 OFFICE O/P EST MOD 30 MIN: CPT | Performed by: INTERNAL MEDICINE

## 2025-03-12 PROCEDURE — 3078F DIAST BP <80 MM HG: CPT | Performed by: INTERNAL MEDICINE

## 2025-03-12 PROCEDURE — 1036F TOBACCO NON-USER: CPT | Performed by: INTERNAL MEDICINE

## 2025-03-12 PROCEDURE — 3074F SYST BP LT 130 MM HG: CPT | Performed by: INTERNAL MEDICINE

## 2025-03-12 PROCEDURE — G2211 COMPLEX E/M VISIT ADD ON: HCPCS | Performed by: INTERNAL MEDICINE

## 2025-03-12 ASSESSMENT — PAIN SCALES - GENERAL: PAINLEVEL_OUTOF10: 0-NO PAIN

## 2025-03-12 NOTE — PROGRESS NOTES
"OUTPATIENT FOLLOW-UP -  VASCULAR MEDICINE    DOS: 3/12/25  Last seen:    24    REQUESTING PHYSICIAN:  Dr. 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 INR followed by the PCP. Denies bleeding on the AC. Using the med-alert. Feels 'Safer\" on the coumadin.  Currently on lovenox - reports pending angiogram tomorrow. Prev had sx \"when she over does it\" - now reports onset of sx sooner. Makes it hard to \"keep up\" when she visits her son in [place] etc. Still doing ketamine. Does have some daytime and night time foot cramping.     From prev notes:  From prev notes:  hospital admission with ALI. [place] [facility] acute on chronic limb ischemia. Underwent thrombectomy via thigh and leg incisions with angioplasty and stenting of the right SFA and AK pop and TP trunk angioplasty. Also had 4 compartment fasciotomy at that time. From eval - reports h/o HCY elevation, +elevated lipoprotein (A) x 1 (other check was normal); APLA - normal; VERA negative. ECHO - no PFO - fairly normal.      By her report the RLE foot changes with paraesthesia, numbness, mottling and IC dated to 2022. Was being evaluated for LCS as an etiology until duplex revealed arterial occlusion and was admitted for eval and procedures as noted.   Has a h/o RUE thromboembolism approx 15 years ago [2004] - s/p embolectomy in [place] ([place]). [id number] was also evaluated for ?TIA vs complex migraine - this was facial droop and some arm drift. +COVID x2 in  - also with c/f facial droop recurring in . Had loop recorder in the past - denies knowledge of arrhythmia. Reports prev work up for thromboembolic disorders in [place] but records not available.       - no pregnancy loss. One pregnancy with pre-eclampsia and IUGR. Denies a personal h/o VTE.    " "  Last visit with Dr. Cash - told things looked OK. MITZY - mild disease (0.9).  CONCLUSIONS:  Right Lower Arterial: There is >50% stenosis documented at the popliteal artery. Known greater than 50% stenosis of the popliteal artery distal visualized on 11/13/2023. Anterior tibial artery was visualized in segments due to edema.  Stent: Lower Extremity Arterial - the stent located in the superficial femoral artery distal right is patent without stenosis.     Reports right calf pain \"if she overdoes it\". Reports could walk a mile without sx. Can be continuously walking in the pool for about an hour before cramping up.      Still doing the ketamine infusion monthly - thinks this helps with the nerve injury. Missed the June infusion and had more sx.     In June 2024 admitted to [facility] with CP - found to have elevate LET. Unclear etiology. Reportedly testing was normal. C/f clots in the liver by report - unfounded. DC with INR 6.2 - c/b hemoptysis - this was ultimately self limited. Resumed warfarin at [place]. Statin was held.     Followed up with Dr. Romero regarding the lipid management and Rx Repatha.       REVIEW OF SYSTEMS:     weight is decreased 50#/2 years with diet modification and swimming  No sores, ulcers, rashes, skin lesions  No CP, chest pressure  No cough, SOB  No BRBPR, melena, hematuria  No bleeding  No edema, no calf pain    PHYSICAL EXAMINATION:   /66 (BP Location: Left arm, Patient Position: Sitting)   Pulse 85   Wt 75.8 kg (167 lb)   BMI 28.22 kg/m²     Gen: Appears well, NAD  Chest: CTA  CVS: regular without murmur or gallop  Ext: no edema, nontender  Skin: good condition without wounds or lesions  Pulses: DP 0/triphasic; PT triphasic/triphasic  Mood and affect appropriate    ADDITIONAL DATA:   no compression worn  right calf: 39.0cm   left calf:  40.5cm     IMPRESSION:  1. Coronary artery calcium score of  0*.       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 Continue the current meds. Needs more aggressive lipid management. Planned for intervention as noted. Continue walking in the pool. Continue the neuropathy management. Follow up 6 months.

## 2025-03-12 NOTE — PATIENT INSTRUCTIONS
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 Continue the current meds. Needs more aggressive lipid management. Planned for intervention as noted. Continue walking in the pool. Continue the neuropathy management. Follow up 6 months.

## 2025-03-13 ENCOUNTER — PHARMACY VISIT (OUTPATIENT)
Dept: PHARMACY | Facility: CLINIC | Age: 65
End: 2025-03-13
Payer: COMMERCIAL

## 2025-03-13 ENCOUNTER — HOSPITAL ENCOUNTER (OUTPATIENT)
Facility: HOSPITAL | Age: 65
Setting detail: OUTPATIENT SURGERY
Discharge: HOME | End: 2025-03-13
Attending: SURGERY | Admitting: SURGERY
Payer: MEDICARE

## 2025-03-13 VITALS
WEIGHT: 167 LBS | HEART RATE: 72 BPM | SYSTOLIC BLOOD PRESSURE: 127 MMHG | BODY MASS INDEX: 28.51 KG/M2 | RESPIRATION RATE: 18 BRPM | HEIGHT: 64 IN | OXYGEN SATURATION: 99 % | DIASTOLIC BLOOD PRESSURE: 72 MMHG

## 2025-03-13 DIAGNOSIS — I73.9 PAD (PERIPHERAL ARTERY DISEASE) (CMS-HCC): Primary | ICD-10-CM

## 2025-03-13 DIAGNOSIS — I74.3 ARTERIAL EMBOLISM AND THROMBOSIS OF LOWER EXTREMITY (MULTI): ICD-10-CM

## 2025-03-13 LAB
POCT INTERNATIONAL NORMALIZATION RATIO: 1.3
POCT PROTHROMBIN TIME: 15.5 SECONDS

## 2025-03-13 PROCEDURE — C1725 CATH, TRANSLUMIN NON-LASER: HCPCS | Performed by: SURGERY

## 2025-03-13 PROCEDURE — C1760 CLOSURE DEV, VASC: HCPCS | Performed by: SURGERY

## 2025-03-13 PROCEDURE — RXMED WILLOW AMBULATORY MEDICATION CHARGE

## 2025-03-13 PROCEDURE — C1894 INTRO/SHEATH, NON-LASER: HCPCS | Performed by: SURGERY

## 2025-03-13 PROCEDURE — 76937 US GUIDE VASCULAR ACCESS: CPT | Performed by: SURGERY

## 2025-03-13 PROCEDURE — 37224 PR REVSC OPN/PRG FEM/POP W/ANGIOPLASTY UNI: CPT | Performed by: SURGERY

## 2025-03-13 PROCEDURE — C1887 CATHETER, GUIDING: HCPCS | Performed by: SURGERY

## 2025-03-13 PROCEDURE — C2623 CATH, TRANSLUMIN, DRUG-COAT: HCPCS | Performed by: SURGERY

## 2025-03-13 PROCEDURE — C1769 GUIDE WIRE: HCPCS | Performed by: SURGERY

## 2025-03-13 PROCEDURE — 75630 X-RAY AORTA LEG ARTERIES: CPT | Performed by: SURGERY

## 2025-03-13 PROCEDURE — 2500000004 HC RX 250 GENERAL PHARMACY W/ HCPCS (ALT 636 FOR OP/ED): Performed by: SURGERY

## 2025-03-13 PROCEDURE — 75774 ARTERY X-RAY EACH VESSEL: CPT | Performed by: SURGERY

## 2025-03-13 PROCEDURE — 75630 X-RAY AORTA LEG ARTERIES: CPT | Mod: 59 | Performed by: SURGERY

## 2025-03-13 PROCEDURE — 2500000001 HC RX 250 WO HCPCS SELF ADMINISTERED DRUGS (ALT 637 FOR MEDICARE OP)

## 2025-03-13 PROCEDURE — 7100000010 HC PHASE TWO TIME - EACH INCREMENTAL 1 MINUTE: Performed by: SURGERY

## 2025-03-13 PROCEDURE — 7100000009 HC PHASE TWO TIME - INITIAL BASE CHARGE: Performed by: SURGERY

## 2025-03-13 PROCEDURE — 2780000003 HC OR 278 NO HCPCS: Performed by: SURGERY

## 2025-03-13 PROCEDURE — G0269 OCCLUSIVE DEVICE IN VEIN ART: HCPCS | Performed by: SURGERY

## 2025-03-13 PROCEDURE — 75710 ARTERY X-RAYS ARM/LEG: CPT | Performed by: SURGERY

## 2025-03-13 PROCEDURE — 2720000007 HC OR 272 NO HCPCS: Performed by: SURGERY

## 2025-03-13 PROCEDURE — 37224 HC REVASCULARIZE FEM/POP ARTERY,ANGIOPLASTY: CPT | Mod: RT | Performed by: SURGERY

## 2025-03-13 PROCEDURE — 99152 MOD SED SAME PHYS/QHP 5/>YRS: CPT | Performed by: SURGERY

## 2025-03-13 PROCEDURE — 36140 INTRO NDL ICATH UPR/LXTR ART: CPT | Mod: LT | Performed by: SURGERY

## 2025-03-13 PROCEDURE — 99153 MOD SED SAME PHYS/QHP EA: CPT | Performed by: SURGERY

## 2025-03-13 PROCEDURE — 2550000001 HC RX 255 CONTRASTS: Performed by: SURGERY

## 2025-03-13 RX ORDER — PROTAMINE SULFATE 10 MG/ML
INJECTION, SOLUTION INTRAVENOUS CONTINUOUS PRN
Status: COMPLETED | OUTPATIENT
Start: 2025-03-13 | End: 2025-03-13

## 2025-03-13 RX ORDER — CLOPIDOGREL BISULFATE 75 MG/1
75 TABLET ORAL DAILY
Qty: 90 TABLET | Refills: 3 | Status: SHIPPED | OUTPATIENT
Start: 2025-03-13 | End: 2026-03-13

## 2025-03-13 RX ORDER — CLOPIDOGREL BISULFATE 75 MG/1
TABLET ORAL
Status: COMPLETED
Start: 2025-03-13 | End: 2025-03-13

## 2025-03-13 RX ORDER — SODIUM CHLORIDE 9 MG/ML
100 INJECTION, SOLUTION INTRAVENOUS CONTINUOUS
Status: DISCONTINUED | OUTPATIENT
Start: 2025-03-13 | End: 2025-03-13 | Stop reason: HOSPADM

## 2025-03-13 RX ORDER — LIDOCAINE HYDROCHLORIDE 10 MG/ML
INJECTION, SOLUTION EPIDURAL; INFILTRATION; INTRACAUDAL; PERINEURAL AS NEEDED
Status: DISCONTINUED | OUTPATIENT
Start: 2025-03-13 | End: 2025-03-13 | Stop reason: HOSPADM

## 2025-03-13 RX ORDER — IODIXANOL 320 MG/ML
INJECTION, SOLUTION INTRAVASCULAR AS NEEDED
Status: DISCONTINUED | OUTPATIENT
Start: 2025-03-13 | End: 2025-03-13 | Stop reason: HOSPADM

## 2025-03-13 RX ORDER — ACETAMINOPHEN 325 MG/1
650 TABLET ORAL EVERY 6 HOURS PRN
Status: DISCONTINUED | OUTPATIENT
Start: 2025-03-13 | End: 2025-03-13 | Stop reason: HOSPADM

## 2025-03-13 RX ORDER — FENTANYL CITRATE 50 UG/ML
INJECTION, SOLUTION INTRAMUSCULAR; INTRAVENOUS AS NEEDED
Status: DISCONTINUED | OUTPATIENT
Start: 2025-03-13 | End: 2025-03-13 | Stop reason: HOSPADM

## 2025-03-13 RX ORDER — MIDAZOLAM HYDROCHLORIDE 1 MG/ML
INJECTION, SOLUTION INTRAMUSCULAR; INTRAVENOUS AS NEEDED
Status: DISCONTINUED | OUTPATIENT
Start: 2025-03-13 | End: 2025-03-13 | Stop reason: HOSPADM

## 2025-03-13 RX ORDER — CLOPIDOGREL BISULFATE 75 MG/1
75 TABLET ORAL DAILY
Qty: 90 TABLET | Refills: 3 | Status: SHIPPED | OUTPATIENT
Start: 2025-03-13 | End: 2025-03-13

## 2025-03-13 RX ORDER — CLOPIDOGREL BISULFATE 75 MG/1
225 TABLET ORAL ONCE
Status: COMPLETED | OUTPATIENT
Start: 2025-03-13 | End: 2025-03-13

## 2025-03-13 RX ORDER — HEPARIN SODIUM 1000 [USP'U]/ML
INJECTION, SOLUTION INTRAVENOUS; SUBCUTANEOUS AS NEEDED
Status: DISCONTINUED | OUTPATIENT
Start: 2025-03-13 | End: 2025-03-13 | Stop reason: HOSPADM

## 2025-03-13 RX ADMIN — CLOPIDOGREL BISULFATE 225 MG: 75 TABLET ORAL at 12:54

## 2025-03-13 ASSESSMENT — PAIN - FUNCTIONAL ASSESSMENT: PAIN_FUNCTIONAL_ASSESSMENT: 0-10

## 2025-03-13 ASSESSMENT — PAIN SCALES - GENERAL: PAINLEVEL_OUTOF10: 2

## 2025-03-13 ASSESSMENT — PAIN DESCRIPTION - DESCRIPTORS: DESCRIPTORS: BURNING

## 2025-03-13 NOTE — DISCHARGE INSTRUCTIONS
*******Please call whomever manages your Coumadin (Warfarin) for an INR check in 2-3 days.  *******    ******TODAY START TAKING WARFARIN DAILY POST PROCEDURE, CONTINUE TAKING LOVENOX INJECTIONS FOR 3 MORE DAYS*******          PERIPHERAL ANGIOGRAPHY DISCHARGE INSTRUCTIONS    FOR SUDDEN AND SEVERE CHEST PAIN, SHORTNESS OF BREATH, EXCESSIVE BLEEDING, SIGNS OF STROKE, OR CHANGES IN MENTAL STATUS YOU SHOULD CALL 911 IMMEDIATELY.     If your provider has prescribed aspirin and/or clopidogrel (Plavix), or prasugrel (Effient), or ticagrelor (Brilinta), DO NOT STOP THESE MEDICATIONS for any reason without talking to your cardiologist first. If any of these were prescribed, you must take them every day without missing a single dose. If you are getting low on these medications, contact your provider immediately for a refill.     You received sedation today, please follow these guidelines:  FOR NEXT 24 HOURS  - Upon discharge, you should return home and rest for the remainder of the day and evening. You do not have to stay on bed rest but should not be very active.  It is recommended a responsible adult be with you for the first 24 hours after the procedure.    - No driving for 24 hours after procedure. Please arrange for someone to drive you home from the hospital today.     - Do not drive, operate machinery, or use power tools for 24 hours after your procedure.     - Do not make any legal decisions for 24 hours after your procedure.     - Do not drink alcoholic beverages for 24 hours after your procedure.    WOUND CARE   *FOR FEMORAL (LEG) ACCESS*  ·      Avoid heavy lifting (over 10 pounds) for 7 days, squatting or excessive bending for 2 days, and strenuous exercise for 7 days.  ·      No submerged bathing, swimming, or hot tubs for the next 7 days, or until fully healed.  ·      Avoid sexual activity for 3-4 days until any groin discomfort has ceased.    ·      You should expect mild tingling in your hand and tenderness  at the puncture site for up to 3 days.    - The transparent dressing should be removed from the site 24 hours after the procedure.  Wash the site gently with soap and water. Rinse well and pat dry. Keep the area clean and dry. You may apply a Band-Aid to the site. Avoid lotions, ointments, or powders until fully healed.     - You may shower the day after your procedure.      - It is normal to notice a small bruise around the puncture site and/or a small grape sized or smaller lump. Any large bruising or large lump warrants a call to the office.     - If bleeding should occur, lay down and apply pressure to the affected area for 10 minutes.  If the bleeding stops notify your physician.  If there is a large amount of bleeding or spurting of blood CALL 911 immediately.  DO NOT drive yourself to the hospital.    - You may experience some tenderness, bruising or minimal inflammation.  If you have any concerns, you may contact the Cath Lab or if any of these symptoms become excessive, contact your cardiologist or go to the emergency room.     OTHER INSTRUCTIONS  - You may take acetaminophen (Tylenol) as directed for discomfort.  If pain is not relieved with acetaminophen (Tylenol), contact your doctor.    - It can be normal to have slight swelling in the leg the procedure was performed on (not the puncture site leg) post-procedure. Elevate the leg as much as possible above the level of your heart. Any excessive swelling, warmth or redness to the leg warrants a call to the office.    - If you notice or experience any of the following, you should notify your doctor or seek medical attention  Chest pain or discomfort  Change in mental status or weakness in extremities.  Dizziness, light headedness, or feeling faint.  Change in the site where the procedure was performed, such as bleeding or an increased area of bruising or swelling.  Tingling, numbness, pain, or coolness in the leg/arm beyond the site where the procedure was  performed.  Signs of infection (i.e. shaking chills, temperature > 100 degrees Fahrenheit, warmth, redness) in the leg/arm area where the procedure was performed.  Changes in urination   Bloody or black stools  Vomiting blood  Severe nose bleeds

## 2025-03-13 NOTE — OP NOTE
Lower Extremity Intervention (R) Operative Note     Date: 3/13/2025  OR Location: [facility] Cardiac Cath Lab    Name: Angle Martins, : 1960, Age: 64 y.o., MRN: [id number], Sex: female    Diagnosis  Pre-op Diagnosis      * PAD (peripheral artery disease) (CMS-HCC) [I73.9]     * 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 (Multi) [I74.3] Post-op Diagnosis     * PAD (peripheral artery disease) (CMS-HCC) [I73.9]     * 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 (Multi) [I74.3]     Procedures  Lower Extremity Intervention  87546 - CO REVASCULARIZATION ILIAC ARTERY ANGIOP 1ST VSL      Surgeons      * Lisset Cash - Primary    Resident/Fellow/Other Assistant:  Surgeons and Role:  * No surgeons found with a matching role *    Staff:   Circulator: Linda  Scrabisai Person: Divya Sanchez Person: Chad  Circulator: Roc    Anesthesia Staff: No anesthesia staff entered.    Procedure Summary  Anesthesia: Anesthesia type not filed in the log.  ASA: ASA status not filed in the log.  Estimated Blood Loss: 1mL  Intra-op Medications:   Administrations occurring from 0803 to 0940 on 25:   Medication Name Total Dose   midazolam (Versed) injection 3 mg   fentaNYL PF (Sublimaze) injection 150 mcg   lidocaine PF (Xylocaine) 10 mg/mL (1 %) injection 5 mL   heparin 1,000 unit/mL injection 8,000 Units   protamine injection 30 mg   iodixanol (VISIPaque) 320 mg iodine/mL injection 60 mL         Intraprocedure I/O Totals       None           Specimen: No specimens collected              Drains and/or Catheters: * None in log *    Tourniquet Times:         Implants:     Findings: Severe narrowing of popliteal artery treated with DCB and POBA to 5mm with resolution, patent peroneal and posterior tibial artery, occlusions in foot noted from prior thromboembolism    Indications: Angle Martins is an 64 y.o. female who is having surgery for PAD (peripheral artery disease) (CMS-HCC) [I73.9]  Arterial embolism and thrombosis  of lower extremity (Multi) [I74.3].     The patient was seen in the preoperative area. The risks, benefits, complications, treatment options, non-operative alternatives, expected recovery and outcomes were discussed with the patient. The possibilities of reaction to medication, pulmonary aspiration, injury to surrounding structures, bleeding, recurrent infection, the need for additional procedures, failure to diagnose a condition, and creating a complication requiring transfusion or operation were discussed with the patient. The patient concurred with the proposed plan, giving informed consent.  The site of surgery was properly noted/marked if necessary per policy. The patient has been actively warmed in preoperative area. Preoperative antibiotics are not indicated. Venous thrombosis prophylaxis are not indicated.    Procedure Details:   Supine position, both groins prepped and draped.  Left common femoral artery accessed under ultrasound guidance after the lidocaine 1% local.  Total local anesthesia was 2 mL.  5 [place]i sheath placed.  Catheter placed into abdominal aorta and abdominal aortogram with bilateral iliofemoral runoff performed demonstrating patency of the aorta and iliac segments.  Both common femorals are patent.  Up and over access using stiff Glidewire and Omni Flush catheter.  Catheter parked in right groin and right leg arteriogram performed.  He demonstrated a tight stenosis of the P3 segment of the popliteal artery.  Diffuse disease also noted above and below this area.  This was treated with a 5 mm drug-coated balloon.  On release of the balloon and arteriography showed dissection in the below-knee popliteal artery.  This was treated with plain balloon angioplasty into segments.  There is angiographic resolution of the dissection.  The two-vessel runoff via the peroneal and posterior tibial arteries remain preserved.  The patient now had a palpable posterior tibial pulse.  There was noted to be  an occluded anterior tibial artery and some branch vessels in the foot there were occluded from previous thromboembolism.  The sheath was retracted and exchanged for short 6 Taiwanese sheath and a left iliofemoral arteriogram performed confirming the common femoral puncture.  A Vascade closure device was deployed with hemostasis obtained.  The patient was given 8000's of heparin at the start of the case and this was reversed with protamine.  Complications:  None; patient tolerated the procedure well.    Disposition: PACU - hemodynamically stable.  Condition: stable                 Additional Details:     Attending Attestation: I was present and scrubbed for the entire procedure.    Lisset Cash  Phone Number: [phone]

## 2025-03-13 NOTE — H&P
History Of Present Illness  Angle Martins is a 64 y.o. female presenting with rest pain of her forefoot including discomfort and pain with cold exposure and not necessarily related to walking. 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 She has pulsatile signals at the digital level on the right foot but they are moderately dampened. Her toe brachial index 0.64. She does have this problem with thromboembolism. She is on Coumadin. .     Past Medical History  Past Medical History:   Diagnosis Date    COVID-19     COVID    Personal history of other venous thrombosis and embolism     History of deep venous thrombosis       Surgical History  Past Surgical History:   Procedure Laterality Date    CT ANGIO AORTA AND BILATERAL ILIOFEMORAL RUN OFF INCLUDING WITHOUT CONTRAST IF PERFORMED  12/8/2022    CT AORTA AND BILATERAL ILIOFEMORAL RUNOFF ANGIOGRAM W AND/OR WO IV CONTRAST 12/8/2022 DOCTOR OFFICE [facility]    CT ANGIO AORTA AND BILATERAL ILIOFEMORAL RUN OFF INCLUDING WITHOUT CONTRAST IF PERFORMED  12/13/2022    CT AORTA AND BILATERAL ILIOFEMORAL RUNOFF ANGIOGRAM W AND/OR WO IV CONTRAST 12/13/2022 DOCTOR OFFICE [facility]GAC[facility]    OTHER SURGICAL HISTORY  07/08/2022    Tubal ligation    OTHER SURGICAL HISTORY  07/08/2022    Shoulder surgery    OTHER SURGICAL HISTORY  07/08/2022    Gallbladder surgery        Social History  She reports that she quit smoking about 37 years ago. Her smoking use included cigarettes. She has never used smokeless tobacco. She reports current alcohol use. She reports that she does not use drugs.    Family History  No family history on file.     Allergies  Promethazine and Phenothiazines    Review of Systems   A complete, 10-point review of systems was completed and negative except as noted above.     Physical Exam   Constitutional: no acute distress  Neuro: awake, alert, oriented; no gross deficits noted   HEENT: No deformities, no scleral icterus   Cardiac: regular  "rate  Pulmonary: unlabored respirations on room air  Abdomen: soft, non-tender, non-distended  Skin: warm and dry; wounds: none  Extremities: no peripheral edema noted  MSK: motor and sensory intact in all extremities  Vascular:   Radial: R: palpable. L: palpable.   Femoral: R: palpable. L: palpable.       Last Recorded Vitals  Blood pressure 123/62, pulse 76, resp. rate 18, height 1.626 m (5' 4\"), weight 75.8 kg (167 lb), SpO2 100%.    Relevant Results        MITZY 3/4  RIGHT Lower PVR                Pressures Ratios  Right Posterior Tibial (Ankle) 129 mmHg  1.05  Right Dorsalis Pedis (Ankle)   123 mmHg  1.00  Right Digit (Great Toe)        79 mmHg   0.64           LEFT Lower PVR                Pressures Ratios  Left Posterior Tibial (Ankle) 124 mmHg  1.01  Left Dorsalis Pedis (Ankle)   153 mmHg  1.24  Left Digit (Great Toe)        107 mmHg  0.87     Assessment/Plan   Assessment & Plan  PAD (peripheral artery disease) (CMS-HCC)    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 (Multi)      A/P PAD and prior embolism to the RLE  Will proceed with RLE angiography and possible intervention in the cath lab.    Jim Forrest MD, PhD  Vascular Surgery, PGY3  [id number]       "

## 2025-03-13 NOTE — NURSING NOTE
END OF PROCEDURE MONITORING CRITERIA  01. BP is within +/- 20% of preprocedure  02. Oxygen sat is at 92% or above on RA, or existing order for O2 treatment, or at pre-sedation levels, otherwise new O2 order needed.  03. Unless the patient has a pre-procedure history of diminished level of consciousness, s/he is easily arousable and when aroused is able to responds appropriately for his/her age.  04. Significant complications related to the specific procedure are absent, have been controlled, or have been evaluated, including:      A. Pain.      B. Wound drainage.      C. All drains and tubes are patent.      D. Nausea and Vomiting. Vomiting is not persisten and has not occurred within 15 minutes prior to discharge.      E. Bladder distention. Voided bladder and/or no symptoms or urinary retention (e.g., bladder distention, frequent voiding in small amounts).      F. Neurovascular status.      G. Level of Consciousness consistent with pre procedural status.        spouse  affirmed that they were driving the patient home. Pt's site still soft and no hematoma noted to left groin. Pt brought down to car in wheelchair. Pt VSS.

## 2025-03-13 NOTE — Clinical Note
Vessel(s): right tibio-peroneal trunk and right dorsalis pedis artery. Injected with hand injections. Single view taken.

## 2025-03-14 ENCOUNTER — ANTICOAGULATION - WARFARIN VISIT (OUTPATIENT)
Dept: CARDIOLOGY | Facility: CLINIC | Age: 65
End: 2025-03-14
Payer: MEDICARE

## 2025-03-14 DIAGNOSIS — I74.9 ARTERIAL EMBOLISM (MULTI): Primary | ICD-10-CM

## 2025-03-14 DIAGNOSIS — I73.9 PAD (PERIPHERAL ARTERY DISEASE) (CMS-HCC): ICD-10-CM

## 2025-03-14 DIAGNOSIS — Z86.718 HISTORY OF DEEP VEIN THROMBOSIS: ICD-10-CM

## 2025-03-14 ASSESSMENT — PAIN SCALES - GENERAL: PAINLEVEL_OUTOF10: 0 - NO PAIN

## 2025-03-17 ENCOUNTER — ANTICOAGULATION - WARFARIN VISIT (OUTPATIENT)
Dept: CARDIOLOGY | Facility: CLINIC | Age: 65
End: 2025-03-17
Payer: MEDICARE

## 2025-03-17 DIAGNOSIS — I74.9 ARTERIAL EMBOLISM (MULTI): Primary | ICD-10-CM

## 2025-03-17 DIAGNOSIS — Z86.718 HISTORY OF DEEP VEIN THROMBOSIS: ICD-10-CM

## 2025-03-17 DIAGNOSIS — I73.9 PAD (PERIPHERAL ARTERY DISEASE) (CMS-HCC): ICD-10-CM

## 2025-03-17 LAB
POC INR: 1.4 (ref 2–3)
POC PROTHROMBIN TIME: ABNORMAL

## 2025-03-17 PROCEDURE — 99211 OFF/OP EST MAY X REQ PHY/QHP: CPT

## 2025-03-17 PROCEDURE — 85610 PROTHROMBIN TIME: CPT | Mod: QW | Performed by: INTERNAL MEDICINE

## 2025-03-17 NOTE — PROGRESS NOTES
Patient identification verified with 2 identifiers.    Location: [facility] - [address] [place] [address] [phone]     Referring Physician: Dr. Stephan Martins  Enrollment/ Re-enrollment date: 2025   INR Goal: 2.0-3.0  INR monitoring is per [facility] protocol.  Anticoagulation Medication: warfarin  Indication: Peripheral Artery Disease (PAD)    Subjective   Pt present by herself for today's visit.    Bleeding signs/symptoms: No.  Pt denies.    Bruising: Yes.  Pt had Left Femoral hematoma and bleeding by access site.  Bruising noted on left side from abdomen through pelvis/groin area and into Left labia.  Major bleeding event: No  Thrombosis signs/symptoms: No  Thromboembolic event: No  Missed doses: Yes.  Pt was off warfarin x 3 days prior to peripheral procedure on 3/13/25  Extra doses: No.    Medication changes: Yes.  Pt is back getting Ketamine infusions and is on Repatha. Pt is on Lovenox BID and was given Plavix load and now taking 75mg daily.  Dietary changes: No.  Pt denies.  Change in health: No.  Pt denies.  Change in activity: No.  Pt denies.  Alcohol: No.  Pt denies.  Other concerns: No    Upcoming Procedures:  Does the Patient Have any upcoming procedures that require interruption in anticoagulation therapy? yes.  Pt had Peripheral Angiogram and PTA done on 3/13/25.  Pt was off warfarin x 3 days prior to.  Pt resumed warfarin on evening of 3/13/25.  Does the patient require bridging? yes.  Pt is on Lovenox Bridge, BID    Dose maintained after last visit.  Pt is currently taking 24mg of warfarin weekly.  Anticoagulation Summary  As of 3/17/2025      INR goal:  2.0-3.0   TTR:  71.6% (1.4 y)   INR used for dosin.40 (3/17/2025)   Weekly warfarin total:  24 mg               Assessment/Plan   Subtherapeutic     1. New dose: no change.  Maintain dose but will give Pt a One time extra dose of 1.5mg (4.5mg on 3/17 & 3/18 instead of 3mg).  24mg weekly.  2. Next INR:  3  days .   Can r/s in 3-7 days depending on INR result.      Education provided to patient during the visit:  Patient instructed to call in interim with questions, concerns and changes.   Patient educated on interactions between medications and warfarin.   Patient educated on dietary consistency in vitamin k consumption.   Patient educated on affects of alcohol consumption while taking warfarin.   Patient educated on signs of bleeding/clotting.   Patient educated on compliance with dosing, follow up appointments, and prescribed plan of care.

## 2025-03-20 ENCOUNTER — ANTICOAGULATION - WARFARIN VISIT (OUTPATIENT)
Dept: CARDIOLOGY | Facility: CLINIC | Age: 65
End: 2025-03-20
Payer: MEDICARE

## 2025-03-20 DIAGNOSIS — I74.9 ARTERIAL EMBOLISM (MULTI): Primary | ICD-10-CM

## 2025-03-20 DIAGNOSIS — I73.9 PAD (PERIPHERAL ARTERY DISEASE) (CMS-HCC): ICD-10-CM

## 2025-03-20 DIAGNOSIS — Z86.718 HISTORY OF DEEP VEIN THROMBOSIS: ICD-10-CM

## 2025-03-20 LAB
POC INR: 2.3 (ref 2–3)
POC PROTHROMBIN TIME: NORMAL

## 2025-03-20 PROCEDURE — 85610 PROTHROMBIN TIME: CPT | Mod: QW | Performed by: INTERNAL MEDICINE

## 2025-03-20 PROCEDURE — 99211 OFF/OP EST MAY X REQ PHY/QHP: CPT

## 2025-03-20 NOTE — PROGRESS NOTES
Patient identification verified with 2 identifiers.    Location: [facility] - [address] [place] [address] [phone]     Referring Physician: Dr. Stephan Martins  Enrollment/ Re-enrollment date: 2025   INR Goal: 2.0-3.0  INR monitoring is per [facility] protocol.  Anticoagulation Medication: warfarin  Indication: Peripheral Artery Disease (PAD)    Subjective   Pt present by herself for today's visit.    Bleeding signs/symptoms: No.  Pt denies.    Bruising: Yes.  Pt had Left Femoral hematoma and bleeding by access site.  Bruising noted on left side from abdomen through pelvis/groin area and into Left labia.  Resolving and walking is improving.  Major bleeding event: No  Thrombosis signs/symptoms: No  Thromboembolic event: No  Missed doses: No.  Pt was off warfarin x 3 days prior to peripheral procedure on 3/13/25  Extra doses: Yes.  Pt took One time extra dose of 1.5mg on 3/17 and 3/18 as instructed.  Medication changes: No.  Pt is back getting Ketamine infusions and is on Repatha. Pt is on Lovenox BID and was given Plavix load and now taking 75mg daily.  Dietary changes: No.  Pt denies.  Change in health: No.  Pt denies.  Change in activity: No.  Pt denies.  Alcohol: No.  Pt denies.  Other concerns: No    Upcoming Procedures:  Does the Patient Have any upcoming procedures that require interruption in anticoagulation therapy? yes.  Pt had Peripheral Angiogram and PTA done on 3/13/25.  Pt was off warfarin x 3 days prior to.  Pt resumed warfarin on evening of 3/13/25.  Does the patient require bridging? no.  Pt is on Lovenox Bridge, BID.  Take last dose of Lovenox on 3/20/25 in the evening.    Dose maintained after last visit but gave Pt a One time extra dose of 1.5mg (4.5mg on 3/17 & 3/18 instead of 3mg).  Pt is currently taking 24mg of warfarin weekly.  Anticoagulation Summary  As of 3/20/2025      INR goal:  2.0-3.0   TTR:  71.4% (1.4 y)   INR used for dosin.30 (3/20/2025)    Weekly warfarin total:  24 mg               Assessment/Plan   Therapeutic     1. New dose: no change.  Maintain dose.  24mg weekly.  2. Next INR: 1 week.   Can r/s in 1-2 weeks if therapeutic next visit.  3. Pt will take last dose of Lovenox this evening, 3/20/25.  4. Stay on warfarin using your normal weekly schedule.      Education provided to patient during the visit:  Patient instructed to call in interim with questions, concerns and changes.   Patient educated on interactions between medications and warfarin.   Patient educated on dietary consistency in vitamin k consumption.   Patient educated on affects of alcohol consumption while taking warfarin.   Patient educated on signs of bleeding/clotting.   Patient educated on compliance with dosing, follow up appointments, and prescribed plan of care.

## 2025-03-24 NOTE — PROGRESS NOTES
History of Present Illness   She is here today for evaluation of right shoulder pain.  She states on 2/23/2025 she was [place] a [place] along [place] and had sudden sharp severe right shoulder pain.  She was seen in the walk-in clinic on 2/24/2025 and given an ultrasound-guided corticosteroid injection into the glenohumeral joint which she notes about 1 week of relief from.  She has a very extensive medical history including multiple lower extremity DVTs, warfarin use and recent hepatic aneurysm.  She cannot take NSAIDs she avoids Tylenol.  She rates the pain about an 8 out of 10 with specific motions.  She has a history of a distal clavicle excision.     Review of Systems   GENERAL: Negative for malaise, significant weight loss, fever  MUSCULOSKELETAL: see HPI  NEURO:  Negative     Past Medical History:   Diagnosis Date    COVID-19     COVID    Personal history of other venous thrombosis and embolism     History of deep venous thrombosis        Medication Documentation Review Audit       Reviewed by Mikhail Jones RN (Registered Nurse) on 03/13/25 at 0741      Medication Order Taking? Sig Documenting Provider Last Dose Status   alpha lipoic acid 600 mg capsule [id number] Yes Take 1 capsule by mouth 2 times a day. Historical Provider, MD 3/12/2025 Active   aspirin 81 mg EC tablet [id number] Yes Chew 1 tablet (81 mg) once daily in the morning. Historical Provider, MD 3/12/2025 Active   buPROPion XL (Wellbutrin XL) 150 mg 24 hr tablet [id number] Yes Please take 1 tablet by mouth with BREAKFAST every morning.  Do not crush, chew, or split. Stephan Martins MD 3/12/2025 Active   cholecalciferol (Vitamin D-3) 50,000 unit capsule [id number] Yes TAKE 1 CAPSULE Weekly SUNDAYS PLEASE with food and full glass water. Thank you Stephan Martins MD Past Week Active   cyanocobalamin (Vitamin B-12) 100 mcg tablet [id number] Yes Please take 1 tablet by mouth with LUNCH every day.  Thank  you. Stephan Martins MD 3/12/2025 Active   dilTIAZem XR (Dilacor XR) 120 mg 24 hr capsule [id number] Yes Take 1 capsule (120 mg) by mouth once daily. Take 1 capsule once daily. Stephan Martins MD 3/13/2025 Active   DULoxetine (Cymbalta) 60 mg DR capsule [id number] Yes Take 1 capsule (60 mg) by mouth once daily. Do not crush or chew. Stephan Martisn MD 3/12/2025 Active   enoxaparin (Lovenox) 80 mg/0.8 mL syringe [id number] Yes Inject 0.8 mL (80 mg) under the skin every 12 hours for 14 days. Lisset Cash MD 3/12/2025 Active   evolocumab (Repatha SureClick) 140 mg/mL injection [id number] Yes Inject 1 mL (140 mg) under the skin every 14 (fourteen) days. Leonardo Romero MD Past Month Active   famotidine (Pepcid) 20 mg tablet [id number] Yes Please take 1 tablet by mouth before breakfast time, and again 1 tablet by mouth before suppertime.  Please take twice a day even if you are not going to eat.  Thank you. Stephan Martins MD 3/12/2025 Active   folic acid (Folvite) 1 mg tablet [id number] Yes Please take 1 tablet by mouth with lunch every day.  Thank you. Stephan Martins MD 3/12/2025 Active   gabapentin (Neurontin) 600 mg tablet [id number] Yes Take 1 tablet (600 mg) by mouth 3 times a day. Jose Armando Velazquez MD 3/13/2025 Morning Active   ipratropium-albuteroL (Duo-Neb) 0.5-2.5 mg/3 mL nebulizer solution [id number] Yes Inhale 3 mL 3 times a day as needed. Historical Provider, MD Past Month Active   ketamine HCl in 0.9 % NaCl (ketamine in 0.9 % sod chloride) 10 mg/mL solution [id number] Yes Infuse into a venous catheter. Historical Provider, MD Past Month Active   melatonin 5 mg tablet [id number] Yes Please take 1 tablet by mouth after supper every evening.  Watch out for daytime sleepiness.  No driving if drowsy.  Thank you. Stephan Martins MD 3/12/2025 Active   naloxone (Narcan) 4 mg/0.1 mL nasal spray [id number]  Administer 1 spray (4 mg) into affected nostril(s) if needed for opioid  reversal. May repeat every 2-3 minutes if needed, alternating nostrils, until medical assistance becomes available. Stephan Martins MD  Active   ondansetron ODT (Zofran-ODT) 4 mg disintegrating tablet [id number]  Please melt on tongue every 6-8 hours as needed for nausea.  Lots of fluids throughout the day.  Thank you. Stephan Martins MD  Active   oxyCODONE (Roxicodone) 5 mg immediate release tablet [id number] Yes Take 1.5 tablets (7.5 mg) by mouth 2 times a day as needed for severe pain (7 - 10). Do not fill before March 9, 2025. Jose Armando Velazquez MD 3/12/2025 Active   pantoprazole (ProtoNix) 40 mg EC tablet [id number] Yes Take 1 tablet (40 mg) by mouth once daily in the morning. Historical Provider, MD 3/12/2025 Active   polyethylene glycol (Glycolax, Miralax) 17 gram packet [id number] Yes Take 8.5 g by mouth once daily as needed. Historical Provider, MD 3/12/2025 Active   warfarin (Coumadin) 3 mg tablet [id number] No Take 1 tablet (3 mg) by mouth once daily at bedtime. Take as directed per After Visit Summary. 3 mg on Sunday, Monday, Tuesday and Friday. 4.5mg on Wednesday and Sunday.   Patient not taking: Reported on 3/13/2025    Stephan Martins MD Not Taking Active                     Physical Exam  Right shoulder  Skin is intact without any evidence of erythema ecchymosis or effusion  She has tenderness to palpation over the anterior and posterior glenohumeral joint as well as proximal biceps tendon  Shoulder range of motion is forward flexion to 150 degrees and she has difficulty getting there due to pain she also notes pain when coming down from forward flexion.  Internal rotation is to the L5 external rotation to 80 degrees abduction to 70 degrees  She has pain with empty can test but no weakness  She has pain with resisted external rotation.  She has pain with Garza test  She is distally neurovascularly intact     Imaging  Invasive vascular procedure  This study is a surgery completed in an  invasive cardiovascular space.   Please see OpNote on Notes tab for findings.       Assessment   Internal derangement right shoulder  Right shoulder sprain     Plan  Unfortunately had a long conversation that given her medical history her treatment options are very limited.  She would like to move forward with a right shoulder MRI.  She will follow-up after the MRI is completed to go over the results and formulate treatment plan which may only include a subacromial bursa injection.  All questions answered.

## 2025-03-25 ENCOUNTER — APPOINTMENT (OUTPATIENT)
Dept: ORTHOPEDIC SURGERY | Facility: CLINIC | Age: 65
End: 2025-03-25
Payer: MEDICARE

## 2025-03-25 DIAGNOSIS — M24.811 INTERNAL DERANGEMENT OF SHOULDER, RIGHT: Primary | ICD-10-CM

## 2025-03-25 PROCEDURE — 99213 OFFICE O/P EST LOW 20 MIN: CPT | Performed by: ORTHOPAEDIC SURGERY

## 2025-03-26 ENCOUNTER — APPOINTMENT (OUTPATIENT)
Dept: ORTHOPEDIC SURGERY | Facility: CLINIC | Age: 65
End: 2025-03-26
Payer: MEDICARE

## 2025-03-27 ENCOUNTER — APPOINTMENT (OUTPATIENT)
Dept: CARDIOLOGY | Facility: CLINIC | Age: 65
End: 2025-03-27
Payer: MEDICARE

## 2025-03-27 ENCOUNTER — ANTICOAGULATION - WARFARIN VISIT (OUTPATIENT)
Dept: CARDIOLOGY | Facility: CLINIC | Age: 65
End: 2025-03-27
Payer: MEDICARE

## 2025-03-27 DIAGNOSIS — Z86.718 HISTORY OF DEEP VEIN THROMBOSIS: ICD-10-CM

## 2025-03-27 DIAGNOSIS — I74.9 ARTERIAL EMBOLISM (MULTI): Primary | ICD-10-CM

## 2025-03-27 DIAGNOSIS — I73.9 PAD (PERIPHERAL ARTERY DISEASE) (CMS-HCC): ICD-10-CM

## 2025-03-27 LAB
POC INR: 2.4 (ref 2–3)
POC PROTHROMBIN TIME: NORMAL

## 2025-03-27 PROCEDURE — 85610 PROTHROMBIN TIME: CPT | Mod: QW | Performed by: INTERNAL MEDICINE

## 2025-03-27 PROCEDURE — 99211 OFF/OP EST MAY X REQ PHY/QHP: CPT

## 2025-03-27 RX ORDER — TIZANIDINE 2 MG/1
2 TABLET ORAL EVERY 6 HOURS PRN
Qty: 30 TABLET | Refills: 0 | Status: SHIPPED | OUTPATIENT
Start: 2025-03-27 | End: 2025-04-06

## 2025-03-27 NOTE — PROGRESS NOTES
Patient identification verified with 2 identifiers.    Location: [facility] - [address] [place] [id number] [phone]     Referring Physician: Dr. Stephan Martins  Enrollment/ Re-enrollment date: 2025   INR Goal: 2.0-3.0  INR monitoring is per [facility] protocol.  Anticoagulation Medication: warfarin  Indication: Peripheral Artery Disease (PAD)    Subjective   Pt present by herself for today's visit.    Bleeding signs/symptoms: No.  Pt denies.    Bruising: No.  Pt had Left Femoral hematoma and bleeding by access site.  Bruising noted on left side from abdomen through pelvis/groin area and into Left labia.  Resolving and walking is improving.  Major bleeding event: No  Thrombosis signs/symptoms: No  Thromboembolic event: No  Missed doses: No.  Pt denies.  Extra doses: No.    Medication changes: No.  Pt is back getting Ketamine infusions and is on Repatha. Pt is on Lovenox BID and was given Plavix load and now taking 75mg daily.  Dietary changes: No.  Pt denies.  Change in health: No.  Pt denies.  Change in activity: No.  Pt denies.  Alcohol: No.  Pt denies.  Other concerns: No    Upcoming Procedures:  Does the Patient Have any upcoming procedures that require interruption in anticoagulation therapy? no  Does the patient require bridging? no.     Dose maintained after last visit.  Pt is currently taking 24mg of warfarin weekly.  Pt instructed to stop Lovenox after 3/20.  Anticoagulation Summary  As of 3/27/2025      INR goal:  2.0-3.0   TTR:  71.7% (1.5 y)   INR used for dosin.40 (3/27/2025)   Weekly warfarin total:  24 mg               Assessment/Plan   Therapeutic     1. New dose: no change.  Maintain dose.  24mg weekly.  2. Next INR: 2 weeks.   Can r/s in 4 weeks if therapeutic next visit.  3. Pt took last dose of Lovenox this evening, 3/20/25.        Education provided to patient during the visit:  Patient instructed to call in interim with questions, concerns and  changes.   Patient educated on interactions between medications and warfarin.   Patient educated on dietary consistency in vitamin k consumption.   Patient educated on affects of alcohol consumption while taking warfarin.   Patient educated on signs of bleeding/clotting.   Patient educated on compliance with dosing, follow up appointments, and prescribed plan of care.

## 2025-04-03 DIAGNOSIS — U09.9 POST-COVID CHRONIC COUGH: ICD-10-CM

## 2025-04-03 DIAGNOSIS — R05.3 POST-COVID CHRONIC COUGH: ICD-10-CM

## 2025-04-03 DIAGNOSIS — M54.16 LUMBAR RADICULAR PAIN: Primary | ICD-10-CM

## 2025-04-03 DIAGNOSIS — R29.818 NEUROGENIC CLAUDICATION: ICD-10-CM

## 2025-04-03 RX ORDER — KETOROLAC TROMETHAMINE 30 MG/ML
30 INJECTION, SOLUTION INTRAMUSCULAR; INTRAVENOUS ONCE
OUTPATIENT
Start: 2025-04-07 | End: 2025-04-07

## 2025-04-03 RX ORDER — KETAMINE HCL IN NACL, ISO-OSM 100MG/10ML
SYRINGE (ML) INJECTION ONCE
OUTPATIENT
Start: 2025-04-07

## 2025-04-07 ENCOUNTER — INFUSION (OUTPATIENT)
Dept: INFUSION THERAPY | Facility: CLINIC | Age: 65
End: 2025-04-07
Payer: MEDICARE

## 2025-04-07 VITALS
TEMPERATURE: 97.5 F | OXYGEN SATURATION: 100 % | RESPIRATION RATE: 12 BRPM | HEART RATE: 68 BPM | SYSTOLIC BLOOD PRESSURE: 150 MMHG | DIASTOLIC BLOOD PRESSURE: 94 MMHG

## 2025-04-07 DIAGNOSIS — R29.818 NEUROGENIC CLAUDICATION: ICD-10-CM

## 2025-04-07 DIAGNOSIS — M54.16 LUMBAR RADICULAR PAIN: ICD-10-CM

## 2025-04-07 DIAGNOSIS — R05.3 POST-COVID CHRONIC COUGH: ICD-10-CM

## 2025-04-07 DIAGNOSIS — U09.9 POST-COVID CHRONIC COUGH: ICD-10-CM

## 2025-04-07 PROCEDURE — 96365 THER/PROPH/DIAG IV INF INIT: CPT | Mod: INF

## 2025-04-07 PROCEDURE — 96375 TX/PRO/DX INJ NEW DRUG ADDON: CPT | Mod: INF

## 2025-04-07 PROCEDURE — 2500000004 HC RX 250 GENERAL PHARMACY W/ HCPCS (ALT 636 FOR OP/ED): Performed by: ANESTHESIOLOGY

## 2025-04-07 PROCEDURE — 96368 THER/DIAG CONCURRENT INF: CPT | Mod: INF

## 2025-04-07 RX ORDER — KETOROLAC TROMETHAMINE 30 MG/ML
30 INJECTION, SOLUTION INTRAMUSCULAR; INTRAVENOUS ONCE
Status: COMPLETED | OUTPATIENT
Start: 2025-04-07 | End: 2025-04-07

## 2025-04-07 RX ORDER — FAMOTIDINE 10 MG/ML
20 INJECTION, SOLUTION INTRAVENOUS ONCE AS NEEDED
OUTPATIENT
Start: 2025-05-07

## 2025-04-07 RX ORDER — KETAMINE HCL IN NACL, ISO-OSM 100MG/10ML
SYRINGE (ML) INJECTION ONCE
OUTPATIENT
Start: 2025-05-07

## 2025-04-07 RX ORDER — EPINEPHRINE 0.3 MG/.3ML
0.3 INJECTION SUBCUTANEOUS EVERY 5 MIN PRN
OUTPATIENT
Start: 2025-05-07

## 2025-04-07 RX ORDER — KETOROLAC TROMETHAMINE 30 MG/ML
30 INJECTION, SOLUTION INTRAMUSCULAR; INTRAVENOUS ONCE
OUTPATIENT
Start: 2025-05-07 | End: 2025-05-07

## 2025-04-07 RX ORDER — NITROGLYCERIN 0.4 MG/1
0.4 TABLET SUBLINGUAL ONCE
OUTPATIENT
Start: 2025-05-07 | End: 2025-05-07

## 2025-04-07 RX ORDER — KETAMINE HCL IN NACL, ISO-OSM 100MG/10ML
SYRINGE (ML) INJECTION ONCE
Status: COMPLETED | OUTPATIENT
Start: 2025-04-07 | End: 2025-04-07

## 2025-04-07 RX ORDER — ONDANSETRON HYDROCHLORIDE 2 MG/ML
4 INJECTION, SOLUTION INTRAVENOUS ONCE
OUTPATIENT
Start: 2025-05-07 | End: 2025-05-07

## 2025-04-07 RX ORDER — DIPHENHYDRAMINE HYDROCHLORIDE 50 MG/ML
50 INJECTION, SOLUTION INTRAMUSCULAR; INTRAVENOUS AS NEEDED
OUTPATIENT
Start: 2025-05-07

## 2025-04-07 RX ORDER — ALBUTEROL SULFATE 0.83 MG/ML
3 SOLUTION RESPIRATORY (INHALATION) AS NEEDED
OUTPATIENT
Start: 2025-05-07

## 2025-04-07 RX ADMIN — KETOROLAC TROMETHAMINE 30 MG: 30 INJECTION, SOLUTION INTRAMUSCULAR at 14:32

## 2025-04-07 RX ADMIN — PROPOFOL 100 MG: 10 INJECTION, EMULSION INTRAVENOUS at 14:32

## 2025-04-07 RX ADMIN — Medication: at 14:32

## 2025-04-07 ASSESSMENT — ENCOUNTER SYMPTOMS
DEPRESSION: 0
OCCASIONAL FEELINGS OF UNSTEADINESS: 0
LOSS OF SENSATION IN FEET: 0

## 2025-04-07 ASSESSMENT — PAIN SCALES - GENERAL: PAINLEVEL_OUTOF10: 3

## 2025-04-07 NOTE — PROGRESS NOTES
S: Patient here for 21st opioid sparing pain infusion. Patient reports 65-70% reduction in pain after last infusion that lasted 3 weeks.    Purpose of pain infusion meds explained along with potential side effects.  Patient verbalized understanding.    B: Pain Issues from right leg knee to toes   Is Patient breast feeding: no    A: Patient currently has pain described on flow sheet documentation. Designated  is Lonnie Martins [phone]. Patient last ate solid food >4 hours ago, and had liquid 1 hours ago.    R: Plan; Obtain IV access, do patient risk assessment, and start opioid sparing infusion as ordered. Monitoring for S/S of adverse reactions.    1525- Post infusion teaching provided. Patient verbalized understanding. VSS, Patient states pain is 0. Will assist patient to waiting car via wheelchair.

## 2025-04-07 NOTE — PATIENT INSTRUCTIONS
Today :We administered ketamine 30 mg-lidocaine 300 mg, propofol (Diprivan), and ketorolac.     For:   1. Lumbar radicular pain    2. Neurogenic claudication    3. Post-COVID chronic cough         (Tell all doctors including dentists that you are taking this medication)     Go to the emergency room or call 911 if:  -You have signs of allergic reaction:   -Rash, hives, itching.   -Swollen, blistered, peeling skin.   -Swelling of face, lips, mouth, tongue or throat.   -Tightness of chest, trouble breathing, swallowing or talking     Call your doctor:  - If IV / injection site gets red, warm, swollen, itchy or leaks fluid or pus.     (Leave dressing on your IV site for at least 2 hours and keep area clean and dry  - If you get sick or have symptoms of infection or are not feeling well for any reason.    (Wash your hands often, stay away from people who are sick)  - If you have side effects from your medication that do not go away or are bothersome.     (Refer to the teaching your nurse gave you for side effects to call your doctor about)    - Common side effects may include:  stuffy nose, headache, feeling tired, muscle aches, upset stomach  - Before receiving any vaccines     - Call [facility] at   If:  - You get sick, are on antibiotics, have had a recent vaccine, have surgery or dental work and your doctor wants your visit rescheduled.  - You need to cancel and reschedule your visit for any reason. Call at least 2 days before your visit if you need to cancel.   - Your insurance changes before your next visit.    (We will need to get approval from your new insurance. This can take up to two weeks.)     [facility] is opened Monday thru Friday. We are closed on weekends and holidays.   Voice mail will take your call if the center is closed. If you leave a message please allow 24 hours for a call back during weekdays. If you leave a message on a weekend/holiday, we will call you back the  next business day.    A pharmacist is available Monday - Friday from 8:30AM to 3:30PM to help answer any questions you may have about your prescriptions(s). Please call pharmacy at:    [facility]: [phone]  [facility]: [phone]  [facility]: [phone]              [facility]      Pain Infusion Aftercare Instructions      1. It is normal to feel sedated, tired and low in energy after a pain infusion. DO NOT DRIVE, OPERATE ANY MACHINERY, OR MAKE ANY IMPORTANT DECISIONS FOR AT LEAST 24 HOURS AFTER THE INFUSION.     2. Call the pain center at [phone] with any problems, questions, or concerns.     3. Eat light after the infusion. If you feel queasy or sick to your stomach, laying down with your eyes closed may help. When you resume eating start with something mild like clear liquids, yogurt, applesauce, crackers, etc… Gradually advance to a regular diet.     4. Do not leave your house alone the evening of your pain infusion.     5. No alcohol or sedative medications, such as sleeping pills, for 24 hours after your pain infusion.     6. Resume all other prescribed medications unless directed otherwise by you physician.     7. If you have any medical emergencies, call 911 or go directly to the closest emergency room.    Patient Instructions  IV Infusion  [facility]  1. A responsible adult must stay with you during your infusion and drive you home. If you do not have a responsible adult with transportation home, your appointment will be rescheduled. Patients must still have a responsible adult if they use Rideshare, Uber, or Lyft. Uber, Ridesh[employer], or Lyft drivers do not qualify.   2. On the day of your infusion we ask that you please drink clear liquids until your appointment.  You should NOT eat food 4 hours before your infusion.    3. Take all medications as prescribed before your infusion. Do not withhold blood pressure  medication.   4. Patients who use a CPAP or BIPAP should bring it to the infusion appointment.   5. Children under 16 will not be permitted in the infusion clinic. Please do not bring young children or pets. For patients who must bring a service animal, paperwork will be required. NO EXCEPTIONS.  6.  All Women will be required to take a pregnancy test prior to the infusion unless they are above 55 years of age or have had a hysterectomy.   7. Patients who cancel their infusion should call the Infusion line at [phone] as soon as possible. We would appreciate a 48-hour notice. Please be on time for the appt. Patients who are more than 15 mins late will have to cancel and reschedule. Infusion appt times are minimal. Patients who do not show, cancel, or reschedule an appointment may have a long wait until we can get them back on the schedule.   8. We make every effort to schedule your infusions based on your physician's recommendations. However, factors will affect our infusion schedule and availability. We appreciate your understanding.  9. Appointments will only be scheduled for two appointments in the future.   10. No eating, drinking, or chewing gum during the infusion.   11. If you miss or cancel your infusion appointment it is YOUR responsibility to call us and reschedule.  Please call [phone] or [phone] to reschedule or cancel appointment

## 2025-04-08 ENCOUNTER — HOSPITAL ENCOUNTER (OUTPATIENT)
Dept: RADIOLOGY | Facility: CLINIC | Age: 65
Discharge: HOME | End: 2025-04-08
Payer: MEDICARE

## 2025-04-08 DIAGNOSIS — M24.811 INTERNAL DERANGEMENT OF SHOULDER, RIGHT: ICD-10-CM

## 2025-04-08 PROCEDURE — 73221 MRI JOINT UPR EXTREM W/O DYE: CPT | Mod: RT

## 2025-04-10 ENCOUNTER — ANTICOAGULATION - WARFARIN VISIT (OUTPATIENT)
Dept: CARDIOLOGY | Facility: CLINIC | Age: 65
End: 2025-04-10
Payer: MEDICARE

## 2025-04-10 DIAGNOSIS — Z86.718 HISTORY OF DEEP VEIN THROMBOSIS: ICD-10-CM

## 2025-04-10 DIAGNOSIS — I74.9 ARTERIAL EMBOLISM (MULTI): Primary | ICD-10-CM

## 2025-04-10 DIAGNOSIS — I73.9 PAD (PERIPHERAL ARTERY DISEASE) (CMS-HCC): ICD-10-CM

## 2025-04-10 LAB
POC INR: 2.3 (ref 2–3)
POC PROTHROMBIN TIME: NORMAL

## 2025-04-10 PROCEDURE — 99211 OFF/OP EST MAY X REQ PHY/QHP: CPT

## 2025-04-10 PROCEDURE — 85610 PROTHROMBIN TIME: CPT | Mod: QW | Performed by: INTERNAL MEDICINE

## 2025-04-10 NOTE — PROGRESS NOTES
Patient identification verified with 2 identifiers.    Location: [facility] - [address] [place] [id number] [phone]     Referring Physician: Dr. Stephan Martins  Enrollment/ Re-enrollment date: 2025   INR Goal: 2.0-3.0  INR monitoring is per [facility] protocol.  Anticoagulation Medication: warfarin  Indication: Peripheral Artery Disease (PAD)    Subjective   Pt present by herself for today's visit.    Bleeding signs/symptoms: No.  Pt denies.    Bruising: Yes.  Pt stated and showed me bruises onher arms and legs with a large one on the outside of her Right knee.  She stated that these occurred a bout a week ago.    Major bleeding event: No  Thrombosis signs/symptoms: No  Thromboembolic event: No  Missed doses: No.  Pt denies.  Extra doses: No.  Pt denies.  Medication changes: No.  Pt is back getting Ketamine infusions and is on Repatha. Pt taking Plavix 75mg daily.  Dietary changes: No.  Pt denies.  Change in health: No.  Pt denies.  Change in activity: No.  Pt denies.  Alcohol: No.  Pt denies.  Other concerns: No    Upcoming Procedures:  Does the Patient Have any upcoming procedures that require interruption in anticoagulation therapy? no  Does the patient require bridging? no.     Dose maintained after last visit.  Pt is currently taking 24mg of warfarin weekly.    Anticoagulation Summary  As of 4/10/2025      INR goal:  2.0-3.0   TTR:  72.5% (1.5 y)   INR used for dosin.30 (4/10/2025)   Weekly warfarin total:  24 mg               Assessment/Plan   Therapeutic     1. New dose: no change.  Maintain dose.  24mg weekly.  2. Next INR: 1 week.   Can r/s in 2-4 weeks if therapeutic next visit.  Depends on Pt's comfort and if she continues to have more bruising.  3. Will inform Dr. Quiroga about the bruising in relation to the Plavix since she is here today and follows Pt.  4. Informed Pt to outline any large bruises and monitor if they get bigger or are raised.  Also instructed  Pt to call with any concerns or visit ER if she feels there is a bleeding issue.  Pt is a nurse.  Pt agrees and verbalizes understanding.      Education provided to patient during the visit:  Patient instructed to call in interim with questions, concerns and changes.   Patient educated on interactions between medications and warfarin.   Patient educated on dietary consistency in vitamin k consumption.   Patient educated on affects of alcohol consumption while taking warfarin.   Patient educated on signs of bleeding/clotting.   Patient educated on compliance with dosing, follow up appointments, and prescribed plan of care.

## 2025-04-14 NOTE — PROGRESS NOTES
History of Present Illness   Chief Complaint   Patient presents with    Right Shoulder - Follow-up     MRI Results       Patient returns today with side: right shoulder pain.  The patient is here to review MRI based on failure to improve with non-surgical modalities.    The pain is sharp in nature, localizes lateral and deep.  Better with rest.    Past Medical History:   Diagnosis Date    COVID-19     COVID    Personal history of other venous thrombosis and embolism     History of deep venous thrombosis       Medication Documentation Review Audit       Reviewed by Michelle Gupta RN (Registered Nurse) on 25 at 1416      Medication Order Taking? Sig Documenting Provider Last Dose Status   alpha lipoic acid 600 mg capsule [id number] Yes Take 1 capsule by mouth 2 times a day. Historical Provider, MD 3/12/2025 Active   buPROPion XL (Wellbutrin XL) 150 mg 24 hr tablet [id number] Yes Please take 1 tablet by mouth with BREAKFAST every morning.  Do not crush, chew, or split. Stephan Martins MD 3/12/2025 Active   cholecalciferol (Vitamin D-3) 50,000 unit capsule [id number] Yes TAKE 1 CAPSULE Weekly SUNDAYS PLEASE with food and full glass water. Thank you Stephan Martins MD Past Week Active   clopidogrel (Plavix) 75 mg tablet [id number] Yes Take 1 tablet (75 mg) by mouth once daily. Start taking on 3/14/2025. Sera Goetz, APRN-CNP  Active   cyanocobalamin (Vitamin B-12) 100 mcg tablet [id number] Yes Please take 1 tablet by mouth with LUNCH every day.  Thank you. Stephan Martins MD 3/12/2025 Active   dilTIAZem XR (Dilacor XR) 120 mg 24 hr capsule [id number] No Take 1 capsule (120 mg) by mouth once daily. Take 1 capsule once daily. Stephan Martins MD 3/13/2025  25 2359   DULoxetine (Cymbalta) 60 mg DR capsule [id number] Yes Take 1 capsule (60 mg) by mouth once daily. Do not crush or chew. Stephan Martins MD 3/12/2025 Active   evolocumab (Repatha SureClick) 140 mg/mL injection  [id number] Yes Inject 1 mL (140 mg) under the skin every 14 (fourteen) days. Leonardo Romero MD Past Month Active   famotidine (Pepcid) 20 mg tablet [id number] Yes Please take 1 tablet by mouth before breakfast time, and again 1 tablet by mouth before suppertime.  Please take twice a day even if you are not going to eat.  Thank you. Stephan Martins MD 3/12/2025 Active   folic acid (Folvite) 1 mg tablet [id number] Yes Please take 1 tablet by mouth with lunch every day.  Thank you. Stephan Martins MD 3/12/2025 Active   gabapentin (Neurontin) 600 mg tablet [id number] No Take 1 tablet (600 mg) by mouth 3 times a day. Jose Armando Velazquez MD 3/13/2025 Morning  25 2359   ipratropium-albuteroL (Duo-Neb) 0.5-2.5 mg/3 mL nebulizer solution [id number] Yes Inhale 3 mL 3 times a day as needed. Historical Provider, MD Past Month Active   ketamine HCl in 0.9 % NaCl (ketamine in 0.9 % sod chloride) 10 mg/mL solution [id number] Yes Infuse into a venous catheter. Historical Provider, MD Past Month Active   melatonin 5 mg tablet [id number] Yes Please take 1 tablet by mouth after supper every evening.  Watch out for daytime sleepiness.  No driving if drowsy.  Thank you. Stephan Martins MD 3/12/2025 Active   naloxone (Narcan) 4 mg/0.1 mL nasal spray [id number] Yes Administer 1 spray (4 mg) into affected nostril(s) if needed for opioid reversal. May repeat every 2-3 minutes if needed, alternating nostrils, until medical assistance becomes available. Stephan Martins MD Taking Active   ondansetron ODT (Zofran-ODT) 4 mg disintegrating tablet [id number] Yes Please melt on tongue every 6-8 hours as needed for nausea.  Lots of fluids throughout the day.  Thank you. Stephan Martins MD Taking Active   oxyCODONE (Roxicodone) 5 mg immediate release tablet [id number] Yes Take 1.5 tablets (7.5 mg) by mouth 2 times a day as needed for severe pain (7 - 10). Do not fill before 2025. Jose Armando Velazquez MD 3/12/2025  Active   pantoprazole (ProtoNix) 40 mg EC tablet [id number] Yes Take 1 tablet (40 mg) by mouth once daily in the morning. Historical Provider, MD 3/12/2025 Active   polyethylene glycol (Glycolax, Miralax) 17 gram packet [id number] Yes Take 8.5 g by mouth once daily as needed. Historical Provider, MD 3/12/2025 Active   tiZANidine (Zanaflex) 2 mg tablet [id number]  Take 1 tablet (2 mg) by mouth every 6 hours if needed for muscle spasms for up to 10 days. Nestor Montero MD   25 6076   warfarin (Coumadin) 3 mg tablet [id number] Yes Take 1 tablet (3 mg) by mouth once daily at bedtime. Take as directed per After Visit Summary. 3 mg on , Monday, Tuesday and Friday. 4.5mg on Wednesday and . Stephan Martins MD Not Taking Active                    Allergies   Allergen Reactions    Promethazine Other     Psychosis    Phenothiazines Anxiety, Other, Hallucinations and Rash     eCW Converted Reaction [id number]       Social History     Socioeconomic History    Marital status:      Spouse name: Not on file    Number of children: Not on file    Years of education: Not on file    Highest education level: Not on file   Occupational History    Not on file   Tobacco Use    Smoking status: Former     Current packs/day: 0.00     Types: Cigarettes     Quit date:      Years since quittin.3    Smokeless tobacco: Never   Substance and Sexual Activity    Alcohol use: Yes     Comment: occ    Drug use: Never    Sexual activity: Not on file   Other Topics Concern    Not on file   Social History Narrative    Not on file     Social Drivers of Health     Financial Resource Strain: Low Risk  (2024)    Overall Financial Resource Strain (CARDIA)     Difficulty of Paying Living Expenses: Not hard at all   Food Insecurity: No Food Insecurity (2024)    [facility]    Hunger Vital Sign     Worried About Running Out of Food in the Last Year: Never true      Ran Out of Food in the Last Year: Never true   Transportation Needs: No Transportation Needs (6/20/2024)    PRAPARE - Transportation     Lack of Transportation (Medical): No     Lack of Transportation (Non-Medical): No   Physical Activity: Not on file   Stress: Not on file   Social Connections: Not on file   Intimate Partner Violence: Not on file   Housing Stability: Low Risk  (6/20/2024)    Housing Stability Vital Sign     Unable to Pay for Housing in the Last Year: No     Number of Places Lived in the Last Year: 1     Unstable Housing in the Last Year: No       Past Surgical History:   Procedure Laterality Date    CT ANGIO AORTA AND BILATERAL ILIOFEMORAL RUN OFF INCLUDING WITHOUT CONTRAST IF PERFORMED  12/8/2022    CT AORTA AND BILATERAL ILIOFEMORAL RUNOFF ANGIOGRAM W AND/OR WO IV CONTRAST 12/8/2022 DOCTOR OFFICE [facility]GAC[facility]    CT ANGIO AORTA AND BILATERAL ILIOFEMORAL RUN OFF INCLUDING WITHOUT CONTRAST IF PERFORMED  12/13/2022    CT AORTA AND BILATERAL ILIOFEMORAL RUNOFF ANGIOGRAM W AND/OR WO IV CONTRAST 12/13/2022 DOCTOR OFFICE [facility]GAC[facility]    INVASIVE VASCULAR PROCEDURE Right 3/13/2025    Procedure: Lower Extremity Intervention;  Surgeon: Lisset Cash MD;  Location: [facility] Cardiac [facility];  Service: Vascular Surgery;  Laterality: Right;    OTHER SURGICAL HISTORY  07/08/2022    Tubal ligation    OTHER SURGICAL HISTORY  07/08/2022    Shoulder surgery    OTHER SURGICAL HISTORY  07/08/2022    Gallbladder surgery        Review of Systems   GENERAL: Negative for malaise, significant weight loss, fever  MUSCULOSKELETAL: see HPI  NEURO:  Negative   28  Physical Exam  This is a 65 year old female in no acute distress  Neck is supple nontender, Spurling's test is negative  side: right Shoulder:  There is  no  tenderness to palpation over the acromioclavicular joint.  Active range of motion: Forward elevation 150, internal rotation to L5, external rotation is about 80 degrees  Jobes test negative  External rotation test  negative  Belly press test negative  Garza maneuvers positive  Elbow and wrist motion were not irritable.  Radial pulse 2+ and palpable.     Imaging:  MR shoulder right wo IV contrast  Narrative: Interpreted By:  Ez Avina,   STUDY:  MRI of the  right shoulder without IV contrast;  4/8/2025 3:53 pm      INDICATION:  Signs/Symptoms:internal derangement of shoulder.      ,M24.811 Other specific joint derangements of right shoulder, not  elsewhere classified      COMPARISON:  None      ACCESSION NUMBER(S):  [id number]      ORDERING CLINICIAN:  CLAU HEREDIA      TECHNIQUE:  MR imaging of the  right shoulder was obtained  without IV contrast.      FINDINGS:  ROTATOR CUFF TENDONS:  The supraspinatus, infraspinatus, subscapularis, and teres minor  tendons are intact. There is no edema or fatty atrophy of the rotator  cuff musculature.      BICEPS TENDON AND ROTATOR INTERVAL:  The intra-articular long head biceps tendon is intact and has a  normal course. The rotator interval is unremarkable.      JOINTS:  The acromioclavicular joint demonstrates mild cartilage loss with  osteophytosis.      There is mild cartilage loss in the glenohumeral joint without  full-thickness articular cartilage defects.      There is a small glenohumeral joint effusion with synovitis.. No  significant bursal fluid collection.      LABRUM:  There is tearing of the superior and anterior superior labrum without  gross detachment. There is truncation of the posterior labrum as well.      OSSEOUS STRUCTURES:  No focal marrow replacing lesions are identified. There is no  fracture.      SOFT TISSUES:  The suprascapular nerve is intact at the suprascapular and  spinoglenoid notches.      Impression: 1. Mild cartilage loss with osteophytosis in the AC and glenohumeral  joints.  2. Small effusion with synovitis in the glenohumeral joint.  3. No rotator cuff tear seen.          I personally reviewed the images/study and I agree with the  findings  as stated. This study was interpreted at [place], [place].      MACRO:  None      Signed by: Ez Avina 4/8/2025 4:05 PM  Dictation workstation:   [id number]         Assessment:  Patient with side: right shoulder  adhesive capsulitis  Glenohumeral osteoarthritis  Impingement syndrome right shoulder     Plan:  MRI was reviewed.   I believe the patient has a mild impingement along with adhesive capsulitis.  The labrum likely has degenerative tearing along with a synovitis and glenohumeral arthrosis.  Physical therapy  Right shoulder subacromial corticosteroid injection  Follow up in 6 weeks  All questions answered    L Inj/Asp: R subacromial bursa on 4/15/2025 3:25 PM  Indications: pain  Details: 21 G needle, posterior approach  Medications: 1 mL lidocaine 10 mg/mL (1 %); 1 mL betamethasone acet,sod phos 6 mg/mL  Outcome: tolerated well, no immediate complications  Consent was given by the patient. Patient was prepped and draped in the usual sterile fashion.

## 2025-04-15 ENCOUNTER — APPOINTMENT (OUTPATIENT)
Dept: ORTHOPEDIC SURGERY | Facility: CLINIC | Age: 65
End: 2025-04-15
Payer: MEDICARE

## 2025-04-15 DIAGNOSIS — M75.41 IMPINGEMENT SYNDROME OF RIGHT SHOULDER: ICD-10-CM

## 2025-04-15 DIAGNOSIS — M19.011 OSTEOARTHRITIS OF GLENOHUMERAL JOINT, RIGHT: ICD-10-CM

## 2025-04-15 DIAGNOSIS — M75.01 ADHESIVE CAPSULITIS OF RIGHT SHOULDER: Primary | ICD-10-CM

## 2025-04-15 PROCEDURE — 20610 DRAIN/INJ JOINT/BURSA W/O US: CPT | Performed by: ORTHOPAEDIC SURGERY

## 2025-04-15 PROCEDURE — 99214 OFFICE O/P EST MOD 30 MIN: CPT | Performed by: ORTHOPAEDIC SURGERY

## 2025-04-15 RX ORDER — BETAMETHASONE SODIUM PHOSPHATE AND BETAMETHASONE ACETATE 3; 3 MG/ML; MG/ML
1 INJECTION, SUSPENSION INTRA-ARTICULAR; INTRALESIONAL; INTRAMUSCULAR; SOFT TISSUE
Status: COMPLETED | OUTPATIENT
Start: 2025-04-15 | End: 2025-04-15

## 2025-04-15 RX ORDER — LIDOCAINE HYDROCHLORIDE 10 MG/ML
1 INJECTION, SOLUTION INFILTRATION; PERINEURAL
Status: COMPLETED | OUTPATIENT
Start: 2025-04-15 | End: 2025-04-15

## 2025-04-15 RX ADMIN — LIDOCAINE HYDROCHLORIDE 1 ML: 10 INJECTION, SOLUTION INFILTRATION; PERINEURAL at 15:25

## 2025-04-15 RX ADMIN — BETAMETHASONE SODIUM PHOSPHATE AND BETAMETHASONE ACETATE 1 ML: 3; 3 INJECTION, SUSPENSION INTRA-ARTICULAR; INTRALESIONAL; INTRAMUSCULAR; SOFT TISSUE at 15:25

## 2025-04-17 ENCOUNTER — APPOINTMENT (OUTPATIENT)
Dept: CARDIOLOGY | Facility: CLINIC | Age: 65
End: 2025-04-17
Payer: MEDICARE

## 2025-04-17 ENCOUNTER — ANTICOAGULATION - WARFARIN VISIT (OUTPATIENT)
Dept: CARDIOLOGY | Facility: CLINIC | Age: 65
End: 2025-04-17
Payer: MEDICARE

## 2025-04-17 DIAGNOSIS — I74.9 ARTERIAL EMBOLISM (MULTI): Primary | ICD-10-CM

## 2025-04-17 DIAGNOSIS — I73.9 PAD (PERIPHERAL ARTERY DISEASE) (CMS-HCC): ICD-10-CM

## 2025-04-17 DIAGNOSIS — Z86.718 HISTORY OF DEEP VEIN THROMBOSIS: ICD-10-CM

## 2025-04-22 ENCOUNTER — ANTICOAGULATION - WARFARIN VISIT (OUTPATIENT)
Dept: CARDIOLOGY | Facility: CLINIC | Age: 65
End: 2025-04-22
Payer: MEDICARE

## 2025-04-22 DIAGNOSIS — I73.9 PAD (PERIPHERAL ARTERY DISEASE) (CMS-HCC): ICD-10-CM

## 2025-04-22 DIAGNOSIS — Z86.718 HISTORY OF DEEP VEIN THROMBOSIS: ICD-10-CM

## 2025-04-22 DIAGNOSIS — I74.9 ARTERIAL EMBOLISM (MULTI): Primary | ICD-10-CM

## 2025-04-22 LAB
POC INR: 2.7 (ref 0.9–1.1)
POC PROTHROMBIN TIME: ABNORMAL (ref 9.3–12.5)

## 2025-04-22 PROCEDURE — 85610 PROTHROMBIN TIME: CPT | Mod: QW

## 2025-04-22 PROCEDURE — 99211 OFF/OP EST MAY X REQ PHY/QHP: CPT

## 2025-04-22 NOTE — PROGRESS NOTES
Patient identification verified with 2 identifiers.    Location: [facility] - [id number] [place] [address] [phone]     Referring Physician: Dr. Stephan Martins  Enrollment/ Re-enrollment date: 2025; will send renewal to Dr. Martins 25.  INR Goal: 2.0-3.0  INR monitoring is per [facility] protocol.  Anticoagulation Medication: warfarin  Indication: Peripheral Artery Disease (PAD)    Subjective   Pt present by herself for today's visit.    Bleeding signs/symptoms: No.  Pt denies.    Bruising: Yes  Pt denies any further or new bruising..      Major bleeding event: No  Thrombosis signs/symptoms: No  Thromboembolic event: No  Missed doses: No.  Pt denies.  Extra doses: No.  Pt denies.  Medication changes: No.    Dietary changes: No.  Pt denies.  Change in health: No.  Pt denies.  Change in activity: No.  Pt denies.  Alcohol: No.  Pt denies.  Other concerns: No    Upcoming Procedures:  Does the Patient Have any upcoming procedures that require interruption in anticoagulation therapy? no  Does the patient require bridging? no.     Dose maintained after last visit.  Pt is currently taking 24mg of warfarin weekly.    Anticoagulation Summary  As of 2025      INR goal:  2.0-3.0   TTR:  73.1% (1.5 y)   INR used for dosin.70 (2025)   Weekly warfarin total:  24 mg               Assessment/Plan   Therapeutic     1. New dose: no change.  Maintain dose.  24mg weekly.  2. Next INR: 3 weeks.   Can r/s in 4 weeks if therapeutic next visit.          Education provided to patient during the visit:  Patient instructed to call in interim with questions, concerns and changes.   Patient educated on interactions between medications and warfarin.   Patient educated on dietary consistency in vitamin k consumption.   Patient educated on affects of alcohol consumption while taking warfarin.   Patient educated on signs of bleeding/clotting.   Patient educated on compliance with dosing,  follow up appointments, and prescribed plan of care.

## 2025-04-28 ENCOUNTER — ANCILLARY PROCEDURE (OUTPATIENT)
Dept: VASCULAR MEDICINE | Facility: CLINIC | Age: 65
End: 2025-04-28
Payer: MEDICARE

## 2025-04-28 ENCOUNTER — OFFICE VISIT (OUTPATIENT)
Dept: VASCULAR SURGERY | Facility: CLINIC | Age: 65
End: 2025-04-28
Payer: MEDICARE

## 2025-04-28 VITALS — WEIGHT: 167 LBS | DIASTOLIC BLOOD PRESSURE: 76 MMHG | SYSTOLIC BLOOD PRESSURE: 122 MMHG | BODY MASS INDEX: 28.67 KG/M2

## 2025-04-28 DIAGNOSIS — I74.3 ARTERIAL EMBOLISM AND THROMBOSIS OF LOWER EXTREMITY: ICD-10-CM

## 2025-04-28 DIAGNOSIS — I73.9 PERIPHERAL ARTERIAL DISEASE (CMS-HCC): Primary | ICD-10-CM

## 2025-04-28 DIAGNOSIS — I73.9 PAD (PERIPHERAL ARTERY DISEASE) (CMS-HCC): ICD-10-CM

## 2025-04-28 PROCEDURE — 99213 OFFICE O/P EST LOW 20 MIN: CPT | Performed by: SURGERY

## 2025-04-28 PROCEDURE — 93922 UPR/L XTREMITY ART 2 LEVELS: CPT

## 2025-04-28 PROCEDURE — 3078F DIAST BP <80 MM HG: CPT | Performed by: SURGERY

## 2025-04-28 PROCEDURE — 93922 UPR/L XTREMITY ART 2 LEVELS: CPT | Performed by: SURGERY

## 2025-04-28 PROCEDURE — 3074F SYST BP LT 130 MM HG: CPT | Performed by: SURGERY

## 2025-04-28 PROCEDURE — 1036F TOBACCO NON-USER: CPT | Performed by: SURGERY

## 2025-04-28 ASSESSMENT — PAIN SCALES - GENERAL: PAINLEVEL_OUTOF10: 0-NO PAIN

## 2025-04-28 ASSESSMENT — ENCOUNTER SYMPTOMS
LOSS OF SENSATION IN FEET: 0
OCCASIONAL FEELINGS OF UNSTEADINESS: 0
DEPRESSION: 0

## 2025-04-28 NOTE — PROGRESS NOTES
"Vascular Surgery Clinic Note - Established Patient    65F s/p RLE angiogram with 5mm DCB of R pop w/ POBA of BK pop due to dissection on 3/13/2025 presenting for post-op follow up.    Since procedure, has had RLE symptoms entirely improve. Previously was having R foot rest pain and R calf cramping with walking. Doesn't have any restriction on distance she can walk.    Denies chest pain, shortness of breath, fevers, chills, nausea, or LLE symptoms.    She notes persistent R 1st and 2nd finger \"stinging\" pain. This is chronic and associated with what sounds like a RUE embolic event that lead to small vessel disease to R 1st and 2nd fingers, of which she was told couldn't be intervened upon. No weakness to R fingers or hand. R hand strength is fine. Fingers just feel cool. Isn't impairing her life.    Still seeing Dr. Quiroga of vascular medicine. On Warfarin and Plavix. Recently, lipo (a) was high on testing, so is being sent to a preventative cardiologist for this, but hasn't seen them yet.    ABIs (4/28/2025):  R 1.13 (109 mmHg; 0.77)  L 1.06 (143 mmHg; 1.01)    ABIs (2/20/2025):  R 0.90 (76 mmHg; 0.60)  L 1.11 (136 mmHg; 1.08)    Otherwise doing well.     No other concerns.    Current Outpatient Medications   Medication Instructions    alpha lipoic acid 600 mg capsule 1 capsule, 2 times daily    buPROPion XL (Wellbutrin XL) 150 mg 24 hr tablet Please take 1 tablet by mouth with BREAKFAST every morning.  Do not crush, chew, or split.    cholecalciferol (Vitamin D-3) 50,000 unit capsule TAKE 1 CAPSULE Weekly SUNDAYS PLEASE with food and full glass water. Thank you    clopidogrel (PLAVIX) 75 mg, oral, Daily, Start taking on 3/14/2025.    cyanocobalamin (Vitamin B-12) 100 mcg tablet Please take 1 tablet by mouth with LUNCH every day.  Thank you.    dilTIAZem XR (DILACOR XR) 120 mg, oral, Daily, Take 1 capsule once daily.    DULoxetine (CYMBALTA) 60 mg, oral, Daily, Do not crush or chew.    famotidine (Pepcid) 20 mg " tablet Please take 1 tablet by mouth before breakfast time, and again 1 tablet by mouth before suppertime.  Please take twice a day even if you are not going to eat.  Thank you.    folic acid (Folvite) 1 mg tablet Please take 1 tablet by mouth with lunch every day.  Thank you.    gabapentin (NEURONTIN) 600 mg, oral, 3 times daily    ipratropium-albuteroL (Duo-Neb) 0.5-2.5 mg/3 mL nebulizer solution 3 mL, 3 times daily PRN    ketamine HCl in 0.9 % NaCl (ketamine in 0.9 % sod chloride) 10 mg/mL solution Infuse into a venous catheter.    melatonin 5 mg tablet Please take 1 tablet by mouth after supper every evening.  Watch out for daytime sleepiness.  No driving if drowsy.  Thank you.    naloxone (NARCAN) 4 mg, nasal, As needed, May repeat every 2-3 minutes if needed, alternating nostrils, until medical assistance becomes available.    ondansetron ODT (Zofran-ODT) 4 mg disintegrating tablet Please melt on tongue every 6-8 hours as needed for nausea.  Lots of fluids throughout the day.  Thank you.    pantoprazole (PROTONIX) 40 mg, Every morning    polyethylene glycol (Glycolax, Miralax) 17 gram packet 0.5 packets, Daily PRN    Repatha SureClick 140 mg, subcutaneous, Every 14 days    tiZANidine (ZANAFLEX) 2 mg, oral, Every 6 hours PRN    warfarin (COUMADIN) 3 mg, oral, Nightly, Take as directed per After Visit Summary. 3 mg on Sunday, Monday, Tuesday and Friday. 4.5mg on Wednesday and Sunday.       Vitals:    04/28/25 1414   BP: 122/76     General: NAD. Nontoxic. No acute distress. Comfortable. Talkative.  HEENT: EOMI. MMM.  Card: RRR on palp of peripheral pulses  Pulm: Nonlabored breathing on room air  Abdomen: Soft, nontender  Extremities  RUE: 1+ palpable radial pulse; 1+ palpable brachial pulse. L 1st and second finger are cooler than other L fingers; they are not ischemic or cyanotic, there are no wounds or gangrene, and the fingers appear healthy.  strength intact. Sensation diminished over 1st and 2nd  finger.  LUE: 2+ palpable radial pulse; 2+ palpable brachial pulse. Motor and sensory intact.  RLE: Palpable PT pulse. Palpable 1+ DP pulse.  LLE: Palpable PT pulse. Palpable 2+ DP pulse.  Neuro: No focal deficits (aside from diminished sensation to R 1st and 2nd finger, chronic).  Psych: Appropriate mood and affect.    Assessment:  65F s/p RLE angiogram with DCB and POBA of popliteal artery for R foot rest pain on 3/13/2025, doing well post-operatively with resolution of her RLE symptoms and palpable pedal pulses.    Plan:  - Return to clinic in 1 year with MITZY  - Continue to follow up with Dr. Quiroga of vascular medicine    Patient seen and discussed with attending surgeon, Dr. Cash.    Everett Hodges MD  Vascular Surgery, PGY4

## 2025-05-01 DIAGNOSIS — R05.3 POST-COVID CHRONIC COUGH: ICD-10-CM

## 2025-05-01 DIAGNOSIS — M54.16 LUMBAR RADICULAR PAIN: Primary | ICD-10-CM

## 2025-05-01 DIAGNOSIS — U09.9 POST-COVID CHRONIC COUGH: ICD-10-CM

## 2025-05-01 DIAGNOSIS — R29.818 NEUROGENIC CLAUDICATION: ICD-10-CM

## 2025-05-01 RX ORDER — KETOROLAC TROMETHAMINE 30 MG/ML
30 INJECTION, SOLUTION INTRAMUSCULAR; INTRAVENOUS ONCE
OUTPATIENT
Start: 2025-05-05 | End: 2025-05-05

## 2025-05-05 ENCOUNTER — INFUSION (OUTPATIENT)
Dept: INFUSION THERAPY | Facility: CLINIC | Age: 65
End: 2025-05-05
Payer: MEDICARE

## 2025-05-05 VITALS
OXYGEN SATURATION: 100 % | TEMPERATURE: 98.1 F | RESPIRATION RATE: 16 BRPM | HEART RATE: 66 BPM | SYSTOLIC BLOOD PRESSURE: 151 MMHG | DIASTOLIC BLOOD PRESSURE: 71 MMHG

## 2025-05-05 DIAGNOSIS — R29.818 NEUROGENIC CLAUDICATION: ICD-10-CM

## 2025-05-05 DIAGNOSIS — U09.9 POST-COVID CHRONIC COUGH: ICD-10-CM

## 2025-05-05 DIAGNOSIS — R05.3 POST-COVID CHRONIC COUGH: ICD-10-CM

## 2025-05-05 DIAGNOSIS — M54.16 LUMBAR RADICULAR PAIN: ICD-10-CM

## 2025-05-05 PROCEDURE — 96375 TX/PRO/DX INJ NEW DRUG ADDON: CPT | Mod: INF

## 2025-05-05 PROCEDURE — 96365 THER/PROPH/DIAG IV INF INIT: CPT | Mod: INF

## 2025-05-05 PROCEDURE — 96368 THER/DIAG CONCURRENT INF: CPT | Mod: INF

## 2025-05-05 PROCEDURE — 2500000004 HC RX 250 GENERAL PHARMACY W/ HCPCS (ALT 636 FOR OP/ED): Mod: JZ | Performed by: NURSE PRACTITIONER

## 2025-05-05 RX ORDER — ONDANSETRON HYDROCHLORIDE 2 MG/ML
4 INJECTION, SOLUTION INTRAVENOUS ONCE
OUTPATIENT
Start: 2025-06-04 | End: 2025-06-04

## 2025-05-05 RX ORDER — ALBUTEROL SULFATE 0.83 MG/ML
3 SOLUTION RESPIRATORY (INHALATION) AS NEEDED
OUTPATIENT
Start: 2025-06-04

## 2025-05-05 RX ORDER — NITROGLYCERIN 0.4 MG/1
0.4 TABLET SUBLINGUAL ONCE
OUTPATIENT
Start: 2025-06-04 | End: 2025-06-04

## 2025-05-05 RX ORDER — DIPHENHYDRAMINE HYDROCHLORIDE 50 MG/ML
50 INJECTION, SOLUTION INTRAMUSCULAR; INTRAVENOUS AS NEEDED
OUTPATIENT
Start: 2025-06-04

## 2025-05-05 RX ORDER — KETOROLAC TROMETHAMINE 30 MG/ML
30 INJECTION, SOLUTION INTRAMUSCULAR; INTRAVENOUS ONCE
Status: COMPLETED | OUTPATIENT
Start: 2025-05-05 | End: 2025-05-05

## 2025-05-05 RX ORDER — FAMOTIDINE 10 MG/ML
20 INJECTION, SOLUTION INTRAVENOUS ONCE AS NEEDED
OUTPATIENT
Start: 2025-06-04

## 2025-05-05 RX ORDER — KETOROLAC TROMETHAMINE 30 MG/ML
30 INJECTION, SOLUTION INTRAMUSCULAR; INTRAVENOUS ONCE
OUTPATIENT
Start: 2025-06-04 | End: 2025-06-04

## 2025-05-05 RX ORDER — EPINEPHRINE 0.3 MG/.3ML
0.3 INJECTION SUBCUTANEOUS EVERY 5 MIN PRN
OUTPATIENT
Start: 2025-06-04

## 2025-05-05 RX ADMIN — Medication: at 13:15

## 2025-05-05 RX ADMIN — KETOROLAC TROMETHAMINE 30 MG: 30 INJECTION, SOLUTION INTRAMUSCULAR at 13:14

## 2025-05-05 RX ADMIN — PROPOFOL 100 MG: 10 INJECTION, EMULSION INTRAVENOUS at 13:15

## 2025-05-05 ASSESSMENT — ENCOUNTER SYMPTOMS
LOSS OF SENSATION IN FEET: 0
DEPRESSION: 0
OCCASIONAL FEELINGS OF UNSTEADINESS: 0

## 2025-05-05 ASSESSMENT — PAIN - FUNCTIONAL ASSESSMENT: PAIN_FUNCTIONAL_ASSESSMENT: 0-10

## 2025-05-05 ASSESSMENT — PAIN SCALES - GENERAL
PAINLEVEL_OUTOF10: 0 - NO PAIN
PAINLEVEL_OUTOF10: 0 - NO PAIN
PAINLEVEL_OUTOF10: 4

## 2025-05-05 NOTE — PATIENT INSTRUCTIONS
[facility] OUTPATIENT CENTER      Pain Infusion Aftercare Instructions      1. It is normal to feel sedated, tired and low in energy after a pain infusion. DO NOT DRIVE, OPERATE ANY MACHINERY, OR MAKE ANY IMPORTANT DECISIONS FOR AT LEAST 24 HOURS AFTER THE INFUSION.     2. Call the pain center at [phone] with any problems, questions, or concerns.     3. Eat light after the infusion. If you feel queasy or sick to your stomach, laying down with your eyes closed may help. When you resume eating start with something mild like clear liquids, yogurt, applesauce, crackers, etc… Gradually advance to a regular diet.     4. Do not leave your house alone the evening of your pain infusion.     5. No alcohol or sedative medications, such as sleeping pills, for 24 hours after your pain infusion.     6. Resume all other prescribed medications unless directed otherwise by you physician.     7. If you have any medical emergencies, call 911 or go directly to the closest emergency room.Today :We administered (ketamine 30 mg, lidocaine (Xylocaine) 300 mg in sodium chloride 0.9% 518 mL IV), propofol (Diprivan) 100 mg in dextrose 5% 25 mL IV, and ketorolac.     For:   1. Lumbar radicular pain    2. Neurogenic claudication    3. Post-COVID chronic cough         Your next appointment is due in:  6/3/25 pain infusion        Please read the  Medication Guide that was given to you and reviewed during todays visit.     (Tell all doctors including dentists that you are taking this medication)     Go to the emergency room or call 911 if:  -You have signs of allergic reaction:   -Rash, hives, itching.   -Swollen, blistered, peeling skin.   -Swelling of face, lips, mouth, tongue or throat.   -Tightness of chest, trouble breathing, swallowing or talking     Call your doctor:  - If IV / injection site gets red, warm, swollen, itchy or leaks fluid or pus.     (Leave dressing on your IV site for at least 2 hours and keep area clean  and dry  - If you get sick or have symptoms of infection or are not feeling well for any reason.    (Wash your hands often, stay away from people who are sick)  - If you have side effects from your medication that do not go away or are bothersome.     (Refer to the teaching your nurse gave you for side effects to call your doctor about)    - Common side effects may include:  stuffy nose, headache, feeling tired, muscle aches, upset stomach  - Before receiving any vaccines     - Call the [facility] at   If:  - You get sick, are on antibiotics, have had a recent vaccine, have surgery or dental work and your doctor wants your visit rescheduled.  - You need to cancel and reschedule your visit for any reason. Call at least 2 days before your visit if you need to cancel.   - Your insurance changes before your next visit.    (We will need to get approval from your new insurance. This can take up to two weeks.)     The [facility] is opened Monday thru Friday. We are closed on weekends and holidays.   Voice mail will take your call if the center is closed. If you leave a message please allow 24 hours for a call back during weekdays. If you leave a message on a weekend/holiday, we will call you back the next business day.    A pharmacist is available Monday - Friday from 8:30AM to 3:30PM to help answer any questions you may have about your prescriptions(s). Please call pharmacy at:    [facility]: [phone]  [facility]: [phone]  [facility]: [phone]      Patient Instructions  IV Infusion  [facility]      1. A responsible adult must stay with you during your infusion and drive you home. If you do not have a responsible adult with transportation home, your appointment will be rescheduled. Patients must still have a responsible adult if they use Rideshare, Uber, or Lyft. Uber, Rideshare, or Lyft drivers do not qualify.   2. On the  day of your infusion we ask that you please drink clear liquids until your appointment.  You should NOT eat food 4 hours before your infusion.    3. Take all medications as prescribed before your infusion. Do not withhold blood pressure medication.   4. Patients who use a CPAP or BIPAP should bring it to the infusion appointment.   5. Children under 16 will not be permitted in the infusion clinic. Please do not bring young children or pets. For patients who must bring a service animal, paperwork will be required. NO EXCEPTIONS.  6.  All Women will be required to take a pregnancy test prior to the infusion unless they are above 55 years of age or have had a hysterectomy.   7. Patients who cancel their infusion should call the Infusion line at [phone] as soon as possible. We would appreciate a 48-hour notice. Please be on time for the appt. Patients who are more than 15 mins late will have to cancel and reschedule. Infusion appt times are minimal. Patients who do not show, cancel, or reschedule an appointment may have a long wait until we can get them back on the schedule.   8. We make every effort to schedule your infusions based on your physician's recommendations. However, factors will affect our infusion schedule and availability. We appreciate your understanding.  9. Appointments will only be scheduled for two appointments in the future.   10. No eating, drinking, or chewing gum during the infusion.   11. If you miss or cancel your infusion appointment it is YOUR responsibility to call us and reschedule.  Please call [phone] or [phone] to reschedule or cancel appointment

## 2025-05-05 NOTE — PROGRESS NOTES
S: Patient here for #22 opioid sparing pain infusion. Patient reports 65-70% reduction in pain after last infusion that lasted 2 weeks.    Purpose of pain infusion meds explained along with potential side effects.  Patient verbalized understanding.    B: Pain Issues. Is Patient breast feeding: No      Is Patient receiving any other form Ketamine from any other facility/ outside clinic ?: No  A: Patient currently has pain described on flow sheet documentation. Designated  is Lonnie Martins (son). Patient last ate solid food >12 hours ago, and had liquid >4 hours ago.    R: Plan; Obtain IV access, do patient risk assessment, and start opioid sparing infusion as ordered. Monitoring for S/S of adverse reactions.

## 2025-05-08 DIAGNOSIS — I82.411 ACUTE EMBOLISM AND THROMBOSIS OF RIGHT FEMORAL VEIN (MULTI): ICD-10-CM

## 2025-05-11 RX ORDER — WARFARIN 3 MG/1
3 TABLET ORAL NIGHTLY
Qty: 103 TABLET | Refills: 1 | Status: SHIPPED | OUTPATIENT
Start: 2025-05-11

## 2025-05-12 ENCOUNTER — ANTICOAGULATION - WARFARIN VISIT (OUTPATIENT)
Dept: CARDIOLOGY | Facility: CLINIC | Age: 65
End: 2025-05-12
Payer: MEDICARE

## 2025-05-12 DIAGNOSIS — Z86.718 HISTORY OF DEEP VEIN THROMBOSIS: ICD-10-CM

## 2025-05-12 DIAGNOSIS — I73.9 PAD (PERIPHERAL ARTERY DISEASE): ICD-10-CM

## 2025-05-12 DIAGNOSIS — I74.9 ARTERIAL EMBOLISM (MULTI): Primary | ICD-10-CM

## 2025-05-13 ENCOUNTER — APPOINTMENT (OUTPATIENT)
Dept: CARDIOLOGY | Facility: CLINIC | Age: 65
End: 2025-05-13
Payer: MEDICARE

## 2025-05-15 ENCOUNTER — ANTICOAGULATION - WARFARIN VISIT (OUTPATIENT)
Dept: CARDIOLOGY | Facility: CLINIC | Age: 65
End: 2025-05-15
Payer: MEDICARE

## 2025-05-15 DIAGNOSIS — I74.9 ARTERIAL EMBOLISM (MULTI): Primary | ICD-10-CM

## 2025-05-15 DIAGNOSIS — I73.9 PAD (PERIPHERAL ARTERY DISEASE): ICD-10-CM

## 2025-05-15 DIAGNOSIS — Z86.718 HISTORY OF DEEP VEIN THROMBOSIS: ICD-10-CM

## 2025-05-15 LAB
POC INR: 2.7 (ref 2–3)
POC PROTHROMBIN TIME: NORMAL (ref 9.3–12.5)

## 2025-05-15 PROCEDURE — 85610 PROTHROMBIN TIME: CPT | Mod: QW | Performed by: INTERNAL MEDICINE

## 2025-05-15 PROCEDURE — 99211 OFF/OP EST MAY X REQ PHY/QHP: CPT

## 2025-05-15 NOTE — PROGRESS NOTES
Patient identification verified with 2 identifiers.    Location: [facility] - [address] [place] [id number] [phone]     Referring Physician: Dr. Stephan Martins MD  Enrollment/ Re-enrollment date: 2026  INR Goal: 2.0-3.0  INR monitoring is per AMS protocol.  Anticoagulation Medication: warfarin  Indication: Peripheral Artery Disease (PAD)    Subjective   Pt present by herself for today's visit.    Bleeding signs/symptoms: No.  Pt denies.    Bruising: Yes.  Pt has circular bruises on forearm bilaterally as well as her knees and shins.  Pt stated that last time she was on Plavix the same thing happened.    Major bleeding event: No  Thrombosis signs/symptoms: No  Thromboembolic event: No  Missed doses: No.  Pt denies.  Extra doses: No.  Pt denies.  Medication changes: No.  Pt is on Plavix until next month.  Dietary changes: No.  Pt denies.  Change in health: No.  Pt denies.  Change in activity: No.  Pt denies.  Alcohol: No.  Pt denies.  Other concerns: No    Upcoming Procedures:  Does the Patient Have any upcoming procedures that require interruption in anticoagulation therapy? no  Does the patient require bridging? no.     Dose maintained after last visit.  Pt is currently taking 24mg of warfarin weekly.    Anticoagulation Summary  As of 5/15/2025      INR goal:  2.0-3.0   TTR:  74.2% (1.6 y)   INR used for dosin.70 (5/15/2025)   Weekly warfarin total:  24 mg               Assessment/Plan   Therapeutic     1. New dose: no change.  Maintain dose.  24mg weekly.  2. Next INR: 1 month.   Can r/s in 4 weeks if therapeutic next visit.          Education provided to patient during the visit:  Patient instructed to call in interim with questions, concerns and changes.   Patient educated on interactions between medications and warfarin.   Patient educated on dietary consistency in vitamin k consumption.   Patient educated on affects of alcohol consumption while taking  warfarin.   Patient educated on signs of bleeding/clotting.   Patient educated on compliance with dosing, follow up appointments, and prescribed plan of care.

## 2025-05-30 ENCOUNTER — APPOINTMENT (OUTPATIENT)
Dept: PRIMARY CARE | Facility: CLINIC | Age: 65
End: 2025-05-30
Payer: MEDICARE

## 2025-05-30 DIAGNOSIS — U09.9 POST-COVID CHRONIC COUGH: ICD-10-CM

## 2025-05-30 DIAGNOSIS — R29.818 NEUROGENIC CLAUDICATION: ICD-10-CM

## 2025-05-30 DIAGNOSIS — M54.16 LUMBAR RADICULAR PAIN: Primary | ICD-10-CM

## 2025-05-30 DIAGNOSIS — R05.3 POST-COVID CHRONIC COUGH: ICD-10-CM

## 2025-05-30 RX ORDER — KETOROLAC TROMETHAMINE 30 MG/ML
30 INJECTION, SOLUTION INTRAMUSCULAR; INTRAVENOUS ONCE
OUTPATIENT
Start: 2025-06-03 | End: 2025-06-03

## 2025-06-02 ENCOUNTER — APPOINTMENT (OUTPATIENT)
Dept: PAIN MEDICINE | Facility: CLINIC | Age: 65
End: 2025-06-02
Payer: MEDICARE

## 2025-06-03 ENCOUNTER — APPOINTMENT (OUTPATIENT)
Dept: INFUSION THERAPY | Facility: CLINIC | Age: 65
End: 2025-06-03
Payer: MEDICARE

## 2025-06-09 ENCOUNTER — EDUCATION (OUTPATIENT)
Dept: CARDIOLOGY | Facility: CLINIC | Age: 65
End: 2025-06-09
Payer: MEDICARE

## 2025-06-09 ENCOUNTER — OFFICE VISIT (OUTPATIENT)
Dept: CARDIOLOGY | Facility: CLINIC | Age: 65
End: 2025-06-09
Payer: MEDICARE

## 2025-06-09 VITALS
DIASTOLIC BLOOD PRESSURE: 71 MMHG | OXYGEN SATURATION: 96 % | HEART RATE: 73 BPM | BODY MASS INDEX: 28.68 KG/M2 | WEIGHT: 168 LBS | HEIGHT: 64 IN | SYSTOLIC BLOOD PRESSURE: 118 MMHG

## 2025-06-09 DIAGNOSIS — J96.91 RESPIRATORY FAILURE WITH HYPOXIA, UNSPECIFIED CHRONICITY: ICD-10-CM

## 2025-06-09 DIAGNOSIS — E78.00 PURE HYPERCHOLESTEROLEMIA: Primary | ICD-10-CM

## 2025-06-09 DIAGNOSIS — E11.40 TYPE 2 DIABETES MELLITUS WITH DIABETIC NEUROPATHY, UNSPECIFIED WHETHER LONG TERM INSULIN USE (MULTI): ICD-10-CM

## 2025-06-09 DIAGNOSIS — M54.31 SCIATICA OF RIGHT SIDE: ICD-10-CM

## 2025-06-09 DIAGNOSIS — I10 ESSENTIAL HYPERTENSION: ICD-10-CM

## 2025-06-09 DIAGNOSIS — M54.16 LUMBAR RADICULAR PAIN: ICD-10-CM

## 2025-06-09 DIAGNOSIS — F11.21 OPIOID DEPENDENCE IN REMISSION (MULTI): ICD-10-CM

## 2025-06-09 DIAGNOSIS — F33.1 MAJOR DEPRESSIVE DISORDER, RECURRENT EPISODE, MODERATE DEGREE: ICD-10-CM

## 2025-06-09 PROCEDURE — 3049F LDL-C 100-129 MG/DL: CPT | Performed by: INTERNAL MEDICINE

## 2025-06-09 PROCEDURE — 93005 ELECTROCARDIOGRAM TRACING: CPT | Performed by: INTERNAL MEDICINE

## 2025-06-09 PROCEDURE — 3078F DIAST BP <80 MM HG: CPT | Performed by: INTERNAL MEDICINE

## 2025-06-09 PROCEDURE — 99214 OFFICE O/P EST MOD 30 MIN: CPT | Performed by: INTERNAL MEDICINE

## 2025-06-09 PROCEDURE — 99212 OFFICE O/P EST SF 10 MIN: CPT | Performed by: INTERNAL MEDICINE

## 2025-06-09 PROCEDURE — 3008F BODY MASS INDEX DOCD: CPT | Performed by: INTERNAL MEDICINE

## 2025-06-09 PROCEDURE — 1036F TOBACCO NON-USER: CPT | Performed by: INTERNAL MEDICINE

## 2025-06-09 PROCEDURE — 3074F SYST BP LT 130 MM HG: CPT | Performed by: INTERNAL MEDICINE

## 2025-06-09 RX ORDER — DILTIAZEM HYDROCHLORIDE 120 MG/1
120 CAPSULE, EXTENDED RELEASE ORAL DAILY
Qty: 90 CAPSULE | Refills: 1 | Status: SHIPPED | OUTPATIENT
Start: 2025-06-09 | End: 2025-12-06

## 2025-06-09 RX ORDER — GABAPENTIN 600 MG/1
600 TABLET ORAL 3 TIMES DAILY
Qty: 90 TABLET | Refills: 3 | Status: SHIPPED | OUTPATIENT
Start: 2025-06-09 | End: 2025-10-07

## 2025-06-09 ASSESSMENT — ENCOUNTER SYMPTOMS
LOSS OF SENSATION IN FEET: 0
OCCASIONAL FEELINGS OF UNSTEADINESS: 0
DEPRESSION: 0

## 2025-06-09 ASSESSMENT — PAIN SCALES - GENERAL: PAINLEVEL_OUTOF10: 2

## 2025-06-09 NOTE — PATIENT INSTRUCTIONS
It was a pleasure to see you in clinic today! We discussed the following topics:  Switching repatha to Praluent. This will also be an every 2 week injectable medication   Fasting lipid panel and CBC in 3 months      Please follow-up with cardiology clinic in 4 months after lab test.      is now using [email or web address], an online health record portal with your visit notes, results, and the ability to portal message your physicians. Ask about getting access at the check-out desk!    To schedule a specialty appointment or test, please call [phone].        Sincerely,     Cardiology Team

## 2025-06-09 NOTE — PROGRESS NOTES
Cardiology Office Note    REASON FOR VISIT:  HLD FU   PCP (requesting provider): Stephan Martins MD.    HPI:  Angle Martins is a 65 y.o. female with a past medical history of Angle Martins is a 64 y.o. female with multiple and recurrent arterial thromboembolic disease with peripheral arterial disease complicated by hypercholesterolemia  who was referred by her vascular medicine physician for consideration of PCSK9 inhibitor therapy.     Last seen by Dr. Romero in 9/2024, discussed and started on PCSK9. She obtained repeat FLP and Lp(a) in 1/2025. T. Cholesterol decreased form 273->202. LDL-C improved from 181->110. However, lp(a) increased from 51-> 60. Notably, patient has recent RLE angiogram withvascular surgery, with 5mm DCB of R pop w/ POBA of BK pop due to dissection on 3/13/2025. Since operation patient reports improvement in proximal R leg claudication. On Warfarin and Plavix & is followed by vascular medicine.     Reports back pain after repatha, but this has gone away since. Additionally reports good lifestyle modification,   loss weight intentionally with improved diet. In addition, Aqua therapy 3-4 x week for 1 hr , gardening. Prior Tobacco use , quit 37 year ago, only occasional alcohol use, no other substances.       Patient denies Fevers, chills, chest pain, palpitations, SOB, cough, wheeze, AB pain, n/v/d, hematochezia, hematuria, or dsyuria.     CARDIAC HISTORY  TTE (2022): LVEF 60-65%,  left ventricular posterior wall thickness, moderately increased  ECG (6/9): normal sinus rhythm, no blocks or conduction defects, no ischemic changes    PAST MEDICAL HISTORY  Medical History[1]    PAST SURGICAL HISTORY  Surgical History[2]    FAMILY HISTORY  Family History[3]    SOCIAL HISTORY   reports that she quit smoking about 37 years ago. Her smoking use included cigarettes. She has never used smokeless tobacco. She reports current alcohol use. She reports that she does not use drugs.    REVIEW OF  SYSTEMS    A 10+ point review of systems was otherwise negative except as noted and per HPI.    ALLERGIES  Allergies[4]    MEDICATIONS  Current Outpatient Medications   Medication Instructions    alpha lipoic acid 600 mg capsule 1 capsule, 2 times daily    buPROPion XL (Wellbutrin XL) 150 mg 24 hr tablet Please take 1 tablet by mouth with BREAKFAST every morning.  Do not crush, chew, or split.    cholecalciferol (Vitamin D-3) 50,000 unit capsule TAKE 1 CAPSULE Weekly SUNDAYS PLEASE with food and full glass water. Thank you    clopidogrel (PLAVIX) 75 mg, oral, Daily, Start taking on 3/14/2025.    cyanocobalamin (Vitamin B-12) 100 mcg tablet Please take 1 tablet by mouth with LUNCH every day.  Thank you.    dilTIAZem XR (DILACOR XR) 120 mg, oral, Daily, Take 1 capsule once daily.    DULoxetine (CYMBALTA) 60 mg, oral, Daily, Do not crush or chew.    famotidine (Pepcid) 20 mg tablet Please take 1 tablet by mouth before breakfast time, and again 1 tablet by mouth before suppertime.  Please take twice a day even if you are not going to eat.  Thank you.    folic acid (Folvite) 1 mg tablet Please take 1 tablet by mouth with lunch every day.  Thank you.    gabapentin (NEURONTIN) 600 mg, oral, 3 times daily    ipratropium-albuteroL (Duo-Neb) 0.5-2.5 mg/3 mL nebulizer solution 3 mL, 3 times daily PRN    ketamine HCl in 0.9 % NaCl (ketamine in 0.9 % sod chloride) 10 mg/mL solution Infuse into a venous catheter.    melatonin 5 mg tablet Please take 1 tablet by mouth after supper every evening.  Watch out for daytime sleepiness.  No driving if drowsy.  Thank you.    naloxone (NARCAN) 4 mg, nasal, As needed, May repeat every 2-3 minutes if needed, alternating nostrils, until medical assistance becomes available.    ondansetron ODT (Zofran-ODT) 4 mg disintegrating tablet Please melt on tongue every 6-8 hours as needed for nausea.  Lots of fluids throughout the day.  Thank you.    pantoprazole (PROTONIX) 40 mg, Every morning     polyethylene glycol (Glycolax, Miralax) 17 gram packet 0.5 packets, Daily PRN    Repatha SureClick 140 mg, subcutaneous, Every 14 days    tiZANidine (ZANAFLEX) 2 mg, oral, Every 6 hours PRN    warfarin (COUMADIN) 3 mg, oral, Nightly, Take as directed per After Visit Summary. 3 mg on Sunday, Monday, Tuesday and Friday. 4.5mg on Wednesday and Sunday.       VITALS  There were no vitals filed for this visit.   There is no height or weight on file to calculate BMI.    PHYSICAL EXAM  Constitutional: Well-developed female in no acute distress.  HEENT: NCAT. EOMI. No JVD  Respiratory: CTAB. No wheezes, crackles, or rhonchi. Normal respiratory effort on RA.  Cardiovascular: RRR, normal S1/S2, No murmurs, rubs, or gallops.   MSK: WWP, no peripheral edema  Skin: No lesions, wounds, or bruising      LABS  WBC (x10*3/uL)   Date Value   03/08/2025 5.1     Hemoglobin (g/dL)   Date Value   03/08/2025 13.2     Platelets (x10*3/uL)   Date Value   03/08/2025 232     Sodium (mmol/L)   Date Value   03/08/2025 139     Potassium (mmol/L)   Date Value   03/08/2025 4.3     Chloride (mmol/L)   Date Value   03/08/2025 106     Bicarbonate (mmol/L)   Date Value   03/08/2025 29     Urea Nitrogen (mg/dL)   Date Value   03/08/2025 12     Creatinine (mg/dL)   Date Value   03/08/2025 0.83     Calcium (mg/dL)   Date Value   03/08/2025 9.4     Total Protein (g/dL)   Date Value   01/02/2025 7.2     Bilirubin, Total (mg/dL)   Date Value   01/02/2025 0.5     Alkaline Phosphatase (U/L)   Date Value   01/02/2025 87     ALT (U/L)   Date Value   01/02/2025 19     AST (U/L)   Date Value   01/02/2025 17     Glucose (mg/dL)   Date Value   03/08/2025 89     Cholesterol (mg/dL)   Date Value   01/02/2025 202 (H)   08/20/2024 273 (H)   07/01/2024 261 (H)     LDL Calculated (mg/dL)   Date Value   01/02/2025 110 (H)   08/20/2024 181 (H)   07/01/2024 182 (H)     HDL-Cholesterol (mg/dL)   Date Value   01/02/2025 77.9   08/20/2024 77.8   07/01/2024 63.4      Triglycerides (mg/dL)   Date Value   01/02/2025 70   08/20/2024 71   07/01/2024 79     Hemoglobin A1C (%)   Date Value   06/18/2024 5.1   02/01/2024 5.5   06/22/2023 5.4   11/16/2022 5.2   07/08/2022 5.5     Lab Results   Component Value Date    LDLF 88 06/22/2023         ASSESSMENT/PLAN  Angle Martins is a 65 y.o. female with a past medical history of recurrent arterial thromboembolic disease with peripheral arterial disease complicated by hypercholesterolemia. Overall, has done with with HLD with repatha, but still not at goal. Holding off on statin therapy given prior transaminitis.  For improved lipid control, can trial switching from Repatha to Praluent (will order 30  specialty pharmacy).  Patient to finish last dose of Repatha and then continue Praluent every 2 weeks.  Will follow-up fasting lipid panel and CBC in 3 months consider adding additional non- statin lipid-lowering agents as needed.  Return to clinic in 4 months    Pt seen and discussed with attending physician, Dr. Pimentel.    Jef Howell DO          [1]   Past Medical History:  Diagnosis Date    COVID-19     COVID    Personal history of other venous thrombosis and embolism     History of deep venous thrombosis   [2]   Past Surgical History:  Procedure Laterality Date    CT ANGIO AORTA AND BILATERAL ILIOFEMORAL RUN OFF INCLUDING WITHOUT CONTRAST IF PERFORMED  12/8/2022    CT AORTA AND BILATERAL ILIOFEMORAL RUNOFF ANGIOGRAM W AND/OR WO IV CONTRAST 12/8/2022 DOCTOR OFFICE LEGAC[facility]    CT ANGIO AORTA AND BILATERAL ILIOFEMORAL RUN OFF INCLUDING WITHOUT CONTRAST IF PERFORMED  12/13/2022    CT AORTA AND BILATERAL ILIOFEMORAL RUNOFF ANGIOGRAM W AND/OR WO IV CONTRAST 12/13/2022 DOCTOR OFFICE LEGACY    INVASIVE VASCULAR PROCEDURE Right 3/13/2025    Procedure: Lower Extremity Intervention;  Surgeon: Lisset Cash MD;  Location: [facility];  Service: Vascular Surgery;  Laterality: Right;    OTHER SURGICAL HISTORY  07/08/2022    Tubal  ligation    OTHER SURGICAL HISTORY  07/08/2022    Shoulder surgery    OTHER SURGICAL HISTORY  07/08/2022    Gallbladder surgery   [3] No family history on file.  [4]   Allergies  Allergen Reactions    Phenothiazines Anxiety, Rash, Other and Hallucinations     eCW Converted Reaction [id number]    Promethazine Other     Psychosis

## 2025-06-09 NOTE — PROGRESS NOTES
Spoke with patient briefly regarding differences between Repatha and Praluent showing demo device as example for Praluent. Very similar to what she was used to with Repatha Sureclick.    Plan would be to transition over to Praluent once she finishes current supply of Repatha then start Praluent 2 weeks later given inadequate response to Repatha. Will send Rx for Praluent to  Specialty pharmacy to assist with prior authorization and potential cost assistance.    Angle denied any other questions/concerns at this time.

## 2025-06-10 ENCOUNTER — APPOINTMENT (OUTPATIENT)
Dept: ORTHOPEDIC SURGERY | Facility: CLINIC | Age: 65
End: 2025-06-10
Payer: MEDICARE

## 2025-06-10 ENCOUNTER — SPECIALTY PHARMACY (OUTPATIENT)
Dept: PHARMACY | Facility: CLINIC | Age: 65
End: 2025-06-10

## 2025-06-11 LAB
ATRIAL RATE: 74 BPM
P AXIS: 24 DEGREES
P OFFSET: 197 MS
P ONSET: 142 MS
PR INTERVAL: 126 MS
Q ONSET: 205 MS
QRS COUNT: 13 BEATS
QRS DURATION: 88 MS
QT INTERVAL: 372 MS
QTC CALCULATION(BAZETT): 412 MS
QTC FREDERICIA: 399 MS
R AXIS: 26 DEGREES
T AXIS: 6 DEGREES
T OFFSET: 391 MS
VENTRICULAR RATE: 74 BPM

## 2025-06-12 ENCOUNTER — ANTICOAGULATION - WARFARIN VISIT (OUTPATIENT)
Dept: CARDIOLOGY | Facility: CLINIC | Age: 65
End: 2025-06-12
Payer: MEDICARE

## 2025-06-12 ENCOUNTER — APPOINTMENT (OUTPATIENT)
Dept: CARDIOLOGY | Facility: CLINIC | Age: 65
End: 2025-06-12
Payer: MEDICARE

## 2025-06-12 DIAGNOSIS — I74.9 ARTERIAL EMBOLISM (MULTI): Primary | ICD-10-CM

## 2025-06-12 DIAGNOSIS — I73.9 PAD (PERIPHERAL ARTERY DISEASE): ICD-10-CM

## 2025-06-12 DIAGNOSIS — Z86.718 HISTORY OF DEEP VEIN THROMBOSIS: ICD-10-CM

## 2025-06-12 NOTE — PROGRESS NOTES
Pt called [facility] clinic and I spoke to her.  Pt requested to r/s her INR appt. To Mon. 6/16 due to a conflict today.  I r/s her appt. For 6/165 in the PM per her request.

## 2025-06-15 PROCEDURE — RXMED WILLOW AMBULATORY MEDICATION CHARGE

## 2025-06-16 ENCOUNTER — SPECIALTY PHARMACY (OUTPATIENT)
Dept: PHARMACY | Facility: CLINIC | Age: 65
End: 2025-06-16

## 2025-06-16 ENCOUNTER — ANTICOAGULATION - WARFARIN VISIT (OUTPATIENT)
Dept: CARDIOLOGY | Facility: CLINIC | Age: 65
End: 2025-06-16
Payer: MEDICARE

## 2025-06-16 DIAGNOSIS — I74.9 ARTERIAL EMBOLISM (MULTI): Primary | ICD-10-CM

## 2025-06-16 DIAGNOSIS — I73.9 PAD (PERIPHERAL ARTERY DISEASE): ICD-10-CM

## 2025-06-16 DIAGNOSIS — Z86.718 HISTORY OF DEEP VEIN THROMBOSIS: ICD-10-CM

## 2025-06-16 LAB
POC INR: 2.8 (ref 0.9–1.1)
POC PROTHROMBIN TIME: ABNORMAL (ref 9.3–12.5)

## 2025-06-16 PROCEDURE — 99211 OFF/OP EST MAY X REQ PHY/QHP: CPT

## 2025-06-16 PROCEDURE — 85610 PROTHROMBIN TIME: CPT | Mod: QW

## 2025-06-16 NOTE — PROGRESS NOTES
Patient identification verified with 2 identifiers.    Location: [facility] - [address] [place] [address] [phone]     Referring Physician: Dr. Stephan Martins MD  Enrollment/ Re-enrollment date: 2026  INR Goal: 2.0-3.0  INR monitoring is per AMS protocol.  Anticoagulation Medication: warfarin  Indication: Peripheral Artery Disease (PAD)    Subjective   Pt present by herself for today's visit.    Bleeding signs/symptoms: No.  Pt denies.    Bruising: No.      Major bleeding event: No  Thrombosis signs/symptoms: No  Thromboembolic event: No  Missed doses: No.  Pt denies.  Extra doses: No.  Pt denies.  Medication changes: Yes  Pt finished plavix and will now return to aspirin daily..    Dietary changes: No.  Pt denies.  Change in health: No.  Pt denies.  Change in activity: No.  Pt denies.  Alcohol: No.  Pt denies.  Other concerns: No    Upcoming Procedures:  Does the Patient Have any upcoming procedures that require interruption in anticoagulation therapy? no  Does the patient require bridging? no.     Dose maintained after last visit.  Pt is currently taking 24mg of warfarin weekly.    Anticoagulation Summary  As of 2025      INR goal:  2.0-3.0   TTR:  75.5% (1.7 y)   INR used for dosin.80 (2025)   Weekly warfarin total:  24 mg               Assessment/Plan   Therapeutic     1. New dose: no change.  Maintain dose.  24mg weekly.  2. Next INR: 1 month.   Can r/s in 4 weeks if therapeutic next visit.          Education provided to patient during the visit:  Patient instructed to call in interim with questions, concerns and changes.   Patient educated on interactions between medications and warfarin.   Patient educated on dietary consistency in vitamin k consumption.   Patient educated on affects of alcohol consumption while taking warfarin.   Patient educated on signs of bleeding/clotting.   Patient educated on compliance with dosing, follow up appointments,  and prescribed plan of care.

## 2025-06-17 ENCOUNTER — APPOINTMENT (OUTPATIENT)
Dept: PAIN MEDICINE | Facility: CLINIC | Age: 65
End: 2025-06-17
Payer: MEDICARE

## 2025-06-17 ENCOUNTER — PHARMACY VISIT (OUTPATIENT)
Dept: PHARMACY | Facility: CLINIC | Age: 65
End: 2025-06-17
Payer: COMMERCIAL

## 2025-06-19 ENCOUNTER — SPECIALTY PHARMACY (OUTPATIENT)
Dept: PHARMACY | Facility: CLINIC | Age: 65
End: 2025-06-19

## 2025-06-30 ENCOUNTER — APPOINTMENT (OUTPATIENT)
Dept: PRIMARY CARE | Facility: CLINIC | Age: 65
End: 2025-06-30
Payer: MEDICARE

## 2025-06-30 VITALS
OXYGEN SATURATION: 97 % | HEIGHT: 64 IN | SYSTOLIC BLOOD PRESSURE: 108 MMHG | WEIGHT: 169 LBS | DIASTOLIC BLOOD PRESSURE: 64 MMHG | HEART RATE: 78 BPM | BODY MASS INDEX: 28.85 KG/M2

## 2025-06-30 DIAGNOSIS — R10.13 DYSPEPSIA: ICD-10-CM

## 2025-06-30 DIAGNOSIS — I10 ESSENTIAL HYPERTENSION: ICD-10-CM

## 2025-06-30 DIAGNOSIS — R73.9 HYPERGLYCEMIA: ICD-10-CM

## 2025-06-30 DIAGNOSIS — I73.9 PAD (PERIPHERAL ARTERY DISEASE): ICD-10-CM

## 2025-06-30 DIAGNOSIS — Z12.31 SCREENING MAMMOGRAM FOR BREAST CANCER: ICD-10-CM

## 2025-06-30 DIAGNOSIS — E78.2 MIXED HYPERLIPIDEMIA: Primary | ICD-10-CM

## 2025-06-30 DIAGNOSIS — D68.9 COAGULOPATHY (MULTI): ICD-10-CM

## 2025-06-30 DIAGNOSIS — Z13.820 SCREENING FOR OSTEOPOROSIS: ICD-10-CM

## 2025-06-30 DIAGNOSIS — K85.00 IDIOPATHIC ACUTE PANCREATITIS WITHOUT INFECTION OR NECROSIS (HHS-HCC): ICD-10-CM

## 2025-06-30 DIAGNOSIS — E53.8 VITAMIN B12 DEFICIENCY: ICD-10-CM

## 2025-06-30 DIAGNOSIS — E55.9 VITAMIN D DEFICIENCY: ICD-10-CM

## 2025-06-30 DIAGNOSIS — D52.9 ANEMIA DUE TO FOLIC ACID DEFICIENCY, UNSPECIFIED DEFICIENCY TYPE: ICD-10-CM

## 2025-06-30 DIAGNOSIS — M85.89 OSTEOPENIA OF MULTIPLE SITES: ICD-10-CM

## 2025-06-30 PROCEDURE — 3049F LDL-C 100-129 MG/DL: CPT | Performed by: INTERNAL MEDICINE

## 2025-06-30 PROCEDURE — 3074F SYST BP LT 130 MM HG: CPT | Performed by: INTERNAL MEDICINE

## 2025-06-30 PROCEDURE — G2211 COMPLEX E/M VISIT ADD ON: HCPCS | Performed by: INTERNAL MEDICINE

## 2025-06-30 PROCEDURE — 1036F TOBACCO NON-USER: CPT | Performed by: INTERNAL MEDICINE

## 2025-06-30 PROCEDURE — 3078F DIAST BP <80 MM HG: CPT | Performed by: INTERNAL MEDICINE

## 2025-06-30 PROCEDURE — 3008F BODY MASS INDEX DOCD: CPT | Performed by: INTERNAL MEDICINE

## 2025-06-30 PROCEDURE — 99213 OFFICE O/P EST LOW 20 MIN: CPT | Performed by: INTERNAL MEDICINE

## 2025-06-30 RX ORDER — FAMOTIDINE 20 MG/1
TABLET, FILM COATED ORAL
Qty: 180 TABLET | Refills: 1 | Status: SHIPPED | OUTPATIENT
Start: 2025-06-30

## 2025-06-30 ASSESSMENT — ENCOUNTER SYMPTOMS
LOSS OF SENSATION IN FEET: 0
DEPRESSION: 0
OCCASIONAL FEELINGS OF UNSTEADINESS: 0

## 2025-06-30 NOTE — PATIENT INSTRUCTIONS
I am sorry that you are having belly symptoms.  Please take your FAMOTIDINE 20 mg twice a day, ideally before breakfast time and before suppertime.  This will protect your belly from irritation from WARFARIN/Coumadin.    I am glad to hear that you have an appointment with GI soon.  Please keep this appointment so that we can have a specialist help us out!    Until then, small frequent meals.  [place] diet.  Avoid fat and dairy.  Also, avoid spicy food, including salsa, tomatoes, onions, garlic, black coffee, dark chocolate.  Lots of fluids please throughout the day.    You will benefit from a MAMMOGRAM and a BONE DENSITY test anytime soon.  I will send word regarding results and possible changes.    Please come back in 3 months.  Please do FASTING laboratory examinations a few days prior.  Please dial [phone] so that you can schedule your appointment.  Otherwise, if you get placed on a wait list, you could potentially have to wait 2 hours.  Sorry, new system.    Again, thank you very much for coming.  See you in 3 months.  We will do your Medicare wellness evaluation at that point.  Until then, please continue to take care of yourself and each other, and please continue to pray for our recovery from this pandemic.  Regards to my brother.            0  Return in 3 months.  40 minutes please.  FASTING laboratory examinations, then see me for Medicare Wellness evaluation.  Review preventive strategies, mammogram, bone density.  Coordinate with GI, cardiology, vascular, pain, orthopedics, as patient is seen.            0

## 2025-06-30 NOTE — PROGRESS NOTES
"Subjective   Patient ID: Angle Martins is a 65 y.o. female who presents for Follow-up (Patient presented today for a 6 month follow up./).    HPI   Some residual GI symptoms perhaps, dyspepsia, some urgency, watery stool, to be seen by GASTROENTEROLOGY soon.  Otherwise, no apparent blood or mucus in stool.  No easy bruisability.  No apparent blood in urine.    Physically active, eating sensibly.  Compliant with medications, tolerating regimens.  Continues to want to improve quality of life, does not wish harm to self or others.    Some occasional upper respiratory symptoms, cough, no particular sputum production.  No dyan substernal chest pain, no orthopnea, no paroxysmal nocturnal dyspnea.  CONSTITUTIONALLY, no fever, no chills.  No night sweats.  No lingering anorexia or nausea.  No apparent lymphadenopathy.  No apparent weight loss.    Seen by CARDIOLOGY, placed on Praluent, which the patient seems to be tolerating.  No particular myalgia.  No particular skin changes.        Review of Systems  Review of systems as in history of present illness, and otherwise, reviewed separately as well, and was unremarkable/negative/noncontributory.        Objective   /64 (BP Location: Left arm, Patient Position: Sitting, BP Cuff Size: Adult)   Pulse 78   Ht 1.626 m (5' 4\")   Wt 76.7 kg (169 lb)   SpO2 97%   BMI 29.01 kg/m²     Physical Exam  In good spirits.  Not in distress or diaphoresis.  Alert, oriented x 3.  Amiable.  Not unkempt.  Continues to want to improve quality of life, and does not wish harm to self or others.  Moderately built.  Remaining relatively independent, capable, appropriate.    HEAD pink palpebral conjunctivae, anicteric sclerae.  Mucous membranes moist.  No tonsillopharyngeal congestion, no thrush.  No sinus or tragal tenderness.  NECK supple, no apparent jugular venous distention.  No apparent lymphadenopathy.  CARDIOVASCULAR not in distress or diaphoresis.  No bipedal edema.  LUNGS not in " distress or diaphoresis.  Not using accessory muscles.  Clear to auscultation bilaterally.  ABDOMEN soft, nontender.  BACK no costovertebral angle tenderness.  EXTREMITIES no clubbing, no cyanosis.  NEURO no facial asymmetry.  No apparent cranial nerve deficits.  Romberg negative.  Ambulating without need of assistance.  No apparent focal weakness.  No tremors.  PSYCH receptive, appropriate, and eager to maintain and improve quality of life.        LABORATORY results reviewed, with January lipid panel noted, LDL cholesterol 110.  Current ASCVD risk 6.9%.  Previous liver function stable.  In August, lipase negative.  Vitamin D 61.  Folic acid adequate.  In June 2024, hemoglobin A1c 5.1, current body mass index 29.01.        Assessment/Plan   Diagnoses and all orders for this visit:  Mixed hyperlipidemia  -     Follow Up In Primary Care - Established; Future  -     Comprehensive Metabolic Panel; Future  -     Lipid Panel; Future  Idiopathic acute pancreatitis without infection or necrosis (HHS-HCC)  -     Follow Up In Primary Care - Established; Future  -     Comprehensive Metabolic Panel; Future  -     Magnesium; Future  -     Lipase; Future  PAD (peripheral artery disease)  -     Follow Up In Primary Care - Established; Future  -     CBC and Auto Differential; Future  -     Comprehensive Metabolic Panel; Future  -     TSH with reflex to Free T4 if abnormal; Future  -     Magnesium; Future  Essential hypertension  -     Follow Up In Primary Care - Established; Future  -     CBC and Auto Differential; Future  -     Comprehensive Metabolic Panel; Future  -     TSH with reflex to Free T4 if abnormal; Future  -     Magnesium; Future  -     Urinalysis with Reflex Microscopic; Future  Hyperglycemia  -     Follow Up In Primary Care - Established; Future  -     Comprehensive Metabolic Panel; Future  -     Urinalysis with Reflex Microscopic; Future  -     Hemoglobin A1C; Future  Anemia due to folic acid deficiency,  unspecified deficiency type  -     Follow Up In Primary Care - [facility]; Future  -     CBC and Auto Differential; Future  -     Vitamin B12; Future  -     Folate; Future  -     Ferritin; Future  Vitamin B12 deficiency  -     Follow Up In Primary Care - [facility]; Future  -     Vitamin B12; Future  -     Folate; Future  Vitamin D deficiency  -     XR DEXA bone density; Future  -     Follow Up In Primary Care - [facility]; Future  -     Comprehensive Metabolic Panel; Future  -     Vitamin D 25-Hydroxy,Total (for eval of Vitamin D levels); Future  Osteopenia of multiple sites  -     XR DEXA bone density; Future  -     Follow Up In Primary Care - [facility]; Future  Screening for osteoporosis  -     XR DEXA bone density; Future  -     Follow Up In Primary Care - [facility]; Future  Screening mammogram for breast cancer  -     BI mammo bilateral screening tomosynthesis; Future  -     Follow Up In Primary Care - [facility]; Future  Dyspepsia  -     famotidine (Pepcid) 20 mg tablet; Please take 1 tablet by mouth before breakfast time, and again 1 tablet by mouth before suppertime.  Please take twice a day even if you are not going to eat.  Thank you.  -     Follow Up In Primary Care - [facility]; Future  Coagulopathy (Multi)  -     famotidine (Pepcid) 20 mg tablet; Please take 1 tablet by mouth before breakfast time, and again 1 tablet by mouth before suppertime.  Please take twice a day even if you are not going to eat.  Thank you.  -     Follow Up In Primary Care - Established; Future       I am sorry that you are having belly symptoms.  Please take your FAMOTIDINE 20 mg twice a day, ideally before breakfast time and before suppertime.  This will protect your belly from irritation from WARFARIN/Coumadin.    I am glad to hear that you have an appointment with GI soon.  Please keep this appointment so that we can have a specialist help us out!    Until then, small frequent meals.  [place] diet.  Avoid fat and  dairy.  Also, avoid spicy food, including salsa, tomatoes, onions, garlic, black coffee, dark chocolate.  Lots of fluids please throughout the day.    You will benefit from a MAMMOGRAM and a BONE DENSITY test anytime soon.  I will send word regarding results and possible changes.    Please come back in 3 months.  Please do FASTING laboratory examinations a few days prior.  Please dial [phone] so that you can schedule your appointment.  Otherwise, if you get placed on a wait list, you could potentially have to wait 2 hours.  Sorry, new system.    Again, thank you very much for coming.  See you in 3 months.  We will do your Medicare wellness evaluation at that point.  Until then, please continue to take care of yourself and each other, and please continue to pray for our recovery from this pandemic.  Regards to my brother.            0  Return in 3 months.  40 minutes please.  FASTING laboratory examinations, then see me for Medicare Wellness evaluation.  Review preventive strategies, mammogram, bone density.  Coordinate with GI, cardiology, vascular, pain, orthopedics, as patient is seen.            0

## 2025-07-01 ENCOUNTER — APPOINTMENT (OUTPATIENT)
Dept: RADIOLOGY | Facility: HOSPITAL | Age: 65
End: 2025-07-01
Payer: MEDICARE

## 2025-07-02 ENCOUNTER — APPOINTMENT (OUTPATIENT)
Dept: RADIOLOGY | Facility: HOSPITAL | Age: 65
End: 2025-07-02
Payer: MEDICARE

## 2025-07-03 ENCOUNTER — INFUSION (OUTPATIENT)
Dept: INFUSION THERAPY | Facility: CLINIC | Age: 65
End: 2025-07-03
Payer: MEDICARE

## 2025-07-03 VITALS
OXYGEN SATURATION: 98 % | HEART RATE: 72 BPM | DIASTOLIC BLOOD PRESSURE: 76 MMHG | TEMPERATURE: 97.2 F | SYSTOLIC BLOOD PRESSURE: 138 MMHG | RESPIRATION RATE: 12 BRPM

## 2025-07-03 DIAGNOSIS — U09.9 POST-COVID CHRONIC COUGH: ICD-10-CM

## 2025-07-03 DIAGNOSIS — R29.818 NEUROGENIC CLAUDICATION: ICD-10-CM

## 2025-07-03 DIAGNOSIS — M54.16 LUMBAR RADICULAR PAIN: Primary | ICD-10-CM

## 2025-07-03 DIAGNOSIS — R05.3 POST-COVID CHRONIC COUGH: ICD-10-CM

## 2025-07-03 PROCEDURE — 96375 TX/PRO/DX INJ NEW DRUG ADDON: CPT | Mod: INF

## 2025-07-03 PROCEDURE — 96368 THER/DIAG CONCURRENT INF: CPT | Mod: INF

## 2025-07-03 PROCEDURE — 96365 THER/PROPH/DIAG IV INF INIT: CPT | Mod: INF

## 2025-07-03 PROCEDURE — 2500000004 HC RX 250 GENERAL PHARMACY W/ HCPCS (ALT 636 FOR OP/ED): Performed by: NURSE PRACTITIONER

## 2025-07-03 RX ORDER — EPINEPHRINE 0.3 MG/.3ML
0.3 INJECTION SUBCUTANEOUS EVERY 5 MIN PRN
OUTPATIENT
Start: 2025-08-02

## 2025-07-03 RX ORDER — ONDANSETRON HYDROCHLORIDE 2 MG/ML
4 INJECTION, SOLUTION INTRAVENOUS ONCE
OUTPATIENT
Start: 2025-08-02 | End: 2025-08-02

## 2025-07-03 RX ORDER — ALBUTEROL SULFATE 0.83 MG/ML
3 SOLUTION RESPIRATORY (INHALATION) AS NEEDED
OUTPATIENT
Start: 2025-08-02

## 2025-07-03 RX ORDER — FAMOTIDINE 10 MG/ML
20 INJECTION, SOLUTION INTRAVENOUS ONCE AS NEEDED
OUTPATIENT
Start: 2025-08-02

## 2025-07-03 RX ORDER — KETOROLAC TROMETHAMINE 30 MG/ML
30 INJECTION, SOLUTION INTRAMUSCULAR; INTRAVENOUS ONCE
Status: COMPLETED | OUTPATIENT
Start: 2025-07-03 | End: 2025-07-03

## 2025-07-03 RX ORDER — DIPHENHYDRAMINE HYDROCHLORIDE 50 MG/ML
50 INJECTION, SOLUTION INTRAMUSCULAR; INTRAVENOUS AS NEEDED
OUTPATIENT
Start: 2025-08-02

## 2025-07-03 RX ORDER — NITROGLYCERIN 0.4 MG/1
0.4 TABLET SUBLINGUAL ONCE
OUTPATIENT
Start: 2025-08-02 | End: 2025-08-02

## 2025-07-03 RX ORDER — KETOROLAC TROMETHAMINE 30 MG/ML
30 INJECTION, SOLUTION INTRAMUSCULAR; INTRAVENOUS ONCE
OUTPATIENT
Start: 2025-08-02 | End: 2025-08-02

## 2025-07-03 RX ADMIN — PROPOFOL 100 MG: 10 INJECTION, EMULSION INTRAVENOUS at 13:14

## 2025-07-03 RX ADMIN — KETOROLAC TROMETHAMINE 30 MG: 30 INJECTION, SOLUTION INTRAMUSCULAR at 13:13

## 2025-07-03 RX ADMIN — Medication: at 13:13

## 2025-07-03 SDOH — ECONOMIC STABILITY: FOOD INSECURITY: WITHIN THE PAST 12 MONTHS, THE FOOD YOU BOUGHT JUST DIDN'T LAST AND YOU DIDN'T HAVE MONEY TO GET MORE.: NEVER TRUE

## 2025-07-03 SDOH — ECONOMIC STABILITY: FOOD INSECURITY: WITHIN THE PAST 12 MONTHS, YOU WORRIED THAT YOUR FOOD WOULD RUN OUT BEFORE YOU GOT MONEY TO BUY MORE.: NEVER TRUE

## 2025-07-03 ASSESSMENT — PAIN SCALES - GENERAL
PAINLEVEL_OUTOF10: 4
PAINLEVEL_OUTOF10: 0 - NO PAIN

## 2025-07-03 ASSESSMENT — ENCOUNTER SYMPTOMS
LOSS OF SENSATION IN FEET: 0
DEPRESSION: 0
OCCASIONAL FEELINGS OF UNSTEADINESS: 0

## 2025-07-03 NOTE — PATIENT INSTRUCTIONS
Today :We administered (ketamine 30 mg, lidocaine (Xylocaine) 300 mg in sodium chloride 0.9% 518 mL IV), propofol (Diprivan) 100 mg in dextrose 5% 25 mL IV, and ketorolac.     For:   1. Lumbar radicular pain    2. Neurogenic claudication    3. Post-COVID chronic cough          (Tell all doctors including dentists that you are taking this medication)     Go to the emergency room or call 911 if:  -You have signs of allergic reaction:   -Rash, hives, itching.   -Swollen, blistered, peeling skin.   -Swelling of face, lips, mouth, tongue or throat.   -Tightness of chest, trouble breathing, swallowing or talking     Call your doctor:  - If IV / injection site gets red, warm, swollen, itchy or leaks fluid or pus.     (Leave dressing on your IV site for at least 2 hours and keep area clean and dry  - If you get sick or have symptoms of infection or are not feeling well for any reason.    (Wash your hands often, stay away from people who are sick)  - If you have side effects from your medication that do not go away or are bothersome.     (Refer to the teaching your nurse gave you for side effects to call your doctor about)    - Common side effects may include:  stuffy nose, headache, feeling tired, muscle aches, upset stomach  - Before receiving any vaccines     - [facility] at   If:  - You get sick, are on antibiotics, have had a recent vaccine, have surgery or dental work and your doctor wants your visit rescheduled.  - You need to cancel and reschedule your visit for any reason. Call at least 2 days before your visit if you need to cancel.   - Your insurance changes before your next visit.    (We will need to get approval from your new insurance. This can take up to two weeks.)     [facility] is opened Monday thru Friday. We are closed on weekends and holidays.   Voice mail will take your call if the center is closed. If you leave a message please allow 24 hours for a call back during  weekdays. If you leave a message on a weekend/holiday, we will call you back the next business day.    A pharmacist is available Monday - Friday from 8:30AM to 3:30PM to help answer any questions you may have about your prescriptions(s). Please call pharmacy at:    [facility]: [phone]  [facility]: [phone]  [facility]: [phone]              [facility]      Pain Infusion Aftercare Instructions      1. It is normal to feel sedated, tired and low in energy after a pain infusion. DO NOT DRIVE, OPERATE ANY MACHINERY, OR MAKE ANY IMPORTANT DECISIONS FOR AT LEAST 24 HOURS AFTER THE INFUSION.     2. Call the pain center at [phone] with any problems, questions, or concerns.     3. Eat light after the infusion. If you feel queasy or sick to your stomach, laying down with your eyes closed may help. When you resume eating start with something mild like clear liquids, yogurt, applesauce, crackers, etc… Gradually advance to a regular diet.     4. Do not leave your house alone the evening of your pain infusion.     5. No alcohol or sedative medications, such as sleeping pills, for 24 hours after your pain infusion.     6. Resume all other prescribed medications unless directed otherwise by you physician.     7. If you have any medical emergencies, call 911 or go directly to the closest emergency room.  Patient Instructions  IV Infusion  [facility]      1. A responsible adult must stay with you during your infusion and drive you home. If you do not have a responsible adult with transportation home, your appointment will be rescheduled. Patients must still have a responsible adult if they use Rideshare, Uber, or Lyft. Uber, Rideshare, or Lyft drivers do not qualify.   2. On the day of your infusion we ask that you please drink clear liquids until your appointment.  You should NOT eat food 4 hours before your infusion.    3. Take all  medications as prescribed before your infusion. Do not withhold blood pressure medication.   4. Patients who use a CPAP or BIPAP should bring it to the infusion appointment.   5. Children under 16 will not be permitted in the infusion clinic. Please do not bring young children or pets. For patients who must bring a service animal, paperwork will be required. NO EXCEPTIONS.  6.  All Women will be required to take a pregnancy test prior to the infusion unless they are above 55 years of age or have had a hysterectomy.   7. Patients who cancel their infusion should call the Infusion line at [phone] as soon as possible. We would appreciate a 48-hour notice. Please be on time for the appt. Patients who are more than 15 mins late will have to cancel and reschedule. Infusion appt times are minimal. Patients who do not show, cancel, or reschedule an appointment may have a long wait until we can get them back on the schedule.   8. We make every effort to schedule your infusions based on your physician's recommendations. However, factors will affect our infusion schedule and availability. We appreciate your understanding.  9. Appointments will only be scheduled for two appointments in the future.   10. No eating, drinking, or chewing gum during the infusion.   11. If you miss or cancel your infusion appointment it is YOUR responsibility to call us and reschedule.  Please call [phone] or [phone] to reschedule or cancel appointment

## 2025-07-03 NOTE — PROGRESS NOTES
S: Patient here for 23rd opioid sparing pain infusion. Patient reports 65% reduction in pain after last infusion that lasted 4 weeks.    Purpose of pain infusion meds explained along with potential side effects.  Patient verbalized understanding.    B: Pain Issues in right leg, knee to toes   Is Patient breast feeding: no      Is Patient receiving any other form Ketamine from any other facility/ outside clinic ?: no  A: Patient currently has pain described on flow sheet documentation. Designated  is Mic,  at [phone]. Patient last ate solid food >4 hours ago, and had liquid 1 hours ago.    R: Plan; Obtain IV access, do patient risk assessment, and start opioid sparing infusion as ordered. Monitoring for S/S of adverse reactions.    1351- Post infusion teaching provided. Patient verbalized understanding. VSS, Patient states pain is 0. Will assist patient to waiting car via wheelchair.

## 2025-07-07 ENCOUNTER — APPOINTMENT (OUTPATIENT)
Dept: RADIOLOGY | Facility: HOSPITAL | Age: 65
End: 2025-07-07
Payer: MEDICARE

## 2025-07-07 ENCOUNTER — TELEMEDICINE (OUTPATIENT)
Dept: PAIN MEDICINE | Facility: CLINIC | Age: 65
End: 2025-07-07
Payer: MEDICARE

## 2025-07-07 DIAGNOSIS — M79.2 NEUROPATHIC PAIN: Primary | ICD-10-CM

## 2025-07-07 PROCEDURE — 3049F LDL-C 100-129 MG/DL: CPT | Performed by: ANESTHESIOLOGY

## 2025-07-07 PROCEDURE — 98012 SYNCH AUDIO-ONLY EST SF 10: CPT | Performed by: ANESTHESIOLOGY

## 2025-07-07 RX ORDER — GABAPENTIN 800 MG/1
800 TABLET ORAL 3 TIMES DAILY
Qty: 90 TABLET | Refills: 3 | Status: SHIPPED | OUTPATIENT
Start: 2025-07-07 | End: 2025-11-04

## 2025-07-07 ASSESSMENT — ENCOUNTER SYMPTOMS
SHORTNESS OF BREATH: 0
LOSS OF SENSATION IN FEET: 1
BLOOD IN STOOL: 0
DEPRESSION: 0
OCCASIONAL FEELINGS OF UNSTEADINESS: 1

## 2025-07-07 ASSESSMENT — PAIN - FUNCTIONAL ASSESSMENT: PAIN_FUNCTIONAL_ASSESSMENT: 0-10

## 2025-07-07 ASSESSMENT — PAIN SCALES - GENERAL: PAINLEVEL_OUTOF10: 4

## 2025-07-07 ASSESSMENT — PAIN DESCRIPTION - DESCRIPTORS: DESCRIPTORS: BURNING

## 2025-07-07 NOTE — PROGRESS NOTES
Virtual or Telephone Consent    While technically available, the patient was unable or unwilling to consent to connect via audio/video telehealth technology; therefore, I performed this visit using a real-time audio only connection between Angle Martins & Jose Armando Velazquez MD.  Verbal consent was requested and obtained from Angle Martins on this date, 07/07/25 for a telehealth visit and the patient's location was confirmed at the time of the visit.    Chief Complain  Follow-up (For pain in knees down to toes in the right leg. Currently take gabapentin and tylenol top manage pain. Currently get infusions once a month, with 55% relief that last for 3 weeks. Had angioplasty that week for narrowing in arteries.)     History Of Present Illness  Angle Martins is a 65 y.o. female here for right leg pain, from knee to her foot. The patient rates the pain at 4 on a scale from 0-10.  The patient describes pain as  burning.  The pain is worsened by bending forward, standing, and walking and is alleviated by medications aspirin and prescribed pain medications.  Since the last visit the pain has stayed the same.  The patient denies any fever, chills, weight loss, bladder/bowel incontinence.     Past Medical History  She has a past medical history of COVID-19 and Personal history of other venous thrombosis and embolism.    Surgical History  She has a past surgical history that includes Other surgical history (07/08/2022); Other surgical history (07/08/2022); Other surgical history (07/08/2022); CT angio aorta and bilateral iliofemoral runoff including without contrast if performed (12/8/2022); CT angio aorta and bilateral iliofemoral runoff including without contrast if performed (12/13/2022); and Invasive Vascular Procedure (Right, 3/13/2025).     Social History  She reports that she quit smoking about 37 years ago. Her smoking use included cigarettes. She has never used smokeless tobacco. She reports current alcohol use. She  reports that she does not use drugs.    Family History  No family history on file.     Allergies  Phenothiazines and Promethazine    Review of Systems  Review of Systems   Respiratory:  Negative for shortness of breath.    Cardiovascular:  Negative for chest pain.   Gastrointestinal:  Negative for blood in stool.   Psychiatric/Behavioral:  Negative for suicidal ideas.         Physical Exam  Physical Exam  Neurological:      Mental Status: She is oriented to person, place, and time.   Psychiatric:         Behavior: Behavior normal.           Last Recorded Vitals  not currently breastfeeding.       Assessment/Plan     Angle Martins is a 65 y.o. female here for follow-up of chronic right lower extremity pain.  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  She continues to have pain in the right lower extremity since then.  Since the last visit she had angioplasty done of her right lower extremity and she noticed improvement after the angioplasty.  Continues to have neuropathic pain.  Currently on ketamine, lidocaine propofol infusions and gabapentin 600 mg 3 times daily.  She reports around 55% improvement in her pain with the current regimen.  I would increase the gabapentin to 800 mg 3 times daily to get additional benefit.  Will continue with the ketamine infusions as previous.  I have personally reviewed the OARRS report.  I have considered the risks of abuse, dependence, addiction and diversion.      Total time spent caring for the patient today was 17 minutes. This includes time spent before the visit reviewing the chart, time spent during the visit, and time spent after the visit on documentation.      Jose Armando Velazquez MD

## 2025-07-11 ENCOUNTER — APPOINTMENT (OUTPATIENT)
Dept: RADIOLOGY | Facility: HOSPITAL | Age: 65
End: 2025-07-11
Payer: MEDICARE

## 2025-07-11 DIAGNOSIS — M85.89 OSTEOPENIA OF MULTIPLE SITES: ICD-10-CM

## 2025-07-11 DIAGNOSIS — E55.9 VITAMIN D DEFICIENCY: ICD-10-CM

## 2025-07-11 DIAGNOSIS — Z13.820 SCREENING FOR OSTEOPOROSIS: ICD-10-CM

## 2025-07-11 PROCEDURE — 77080 DXA BONE DENSITY AXIAL: CPT

## 2025-07-13 LAB
INR IN PPP BY COAGULATION ASSAY EXTERNAL: 2
PROTHROMBIN TIME (PT) IN PPP BY COAGULATION ASSAY EXTERNAL: NORMAL

## 2025-07-14 ENCOUNTER — ANTICOAGULATION - WARFARIN VISIT (OUTPATIENT)
Dept: CARDIOLOGY | Facility: CLINIC | Age: 65
End: 2025-07-14
Payer: MEDICARE

## 2025-07-14 DIAGNOSIS — I73.9 PAD (PERIPHERAL ARTERY DISEASE): ICD-10-CM

## 2025-07-14 DIAGNOSIS — I74.9 ARTERIAL EMBOLISM (MULTI): ICD-10-CM

## 2025-07-14 DIAGNOSIS — Z86.718 HISTORY OF DEEP VEIN THROMBOSIS: ICD-10-CM

## 2025-07-14 PROCEDURE — 99211 OFF/OP EST MAY X REQ PHY/QHP: CPT

## 2025-07-14 NOTE — PROGRESS NOTES
Patient identification verified with 2 identifiers.    Location: [facility] - [address] [place] [id number] [phone]     Referring Physician: Dr. Stephan Martins MD  Enrollment/ Re-enrollment date: 2026  INR Goal: 2.0-3.0  INR monitoring is per [facility] protocol.  Anticoagulation Medication: warfarin  Indication: Peripheral Artery Disease (PAD)    Subjective   Pt present by herself for today's visit.    Bleeding signs/symptoms: No.  Pt denies.    Bruising: No.      Major bleeding event: No  Thrombosis signs/symptoms: No  Thromboembolic event: No  Missed doses: No.  Pt denies.  Extra doses: No.  Pt denies.  Medication changes: No.    Dietary changes: No.  Pt denies.  Change in health: No.  Pt denies.  Change in activity: No.  Pt denies.  Alcohol: No.  Pt denies.  Other concerns: No    Upcoming Procedures:  Does the Patient Have any upcoming procedures that require interruption in anticoagulation therapy? no  Does the patient require bridging? no.     Dose maintained after last visit.  Pt is currently taking 24mg of warfarin weekly.    Anticoagulation Summary  As of 2025      INR goal:  2.0-3.0   TTR:  76.5% (1.7 y)   INR used for dosin.00 (2025)   Weekly warfarin total:  24 mg               Assessment/Plan   Therapeutic     1. New dose: no change.  Maintain dose.  24mg weekly.  2. Next INR: 1 month.   Can r/s in 4 weeks if therapeutic next visit.          Education provided to patient during the visit:  Patient instructed to call in interim with questions, concerns and changes.   Patient educated on interactions between medications and warfarin.   Patient educated on dietary consistency in vitamin k consumption.   Patient educated on affects of alcohol consumption while taking warfarin.   Patient educated on signs of bleeding/clotting.   Patient educated on compliance with dosing, follow up appointments, and prescribed plan of care.

## 2025-07-22 ENCOUNTER — APPOINTMENT (OUTPATIENT)
Dept: RADIOLOGY | Facility: HOSPITAL | Age: 65
End: 2025-07-22
Payer: MEDICARE

## 2025-07-23 ENCOUNTER — TELEPHONE (OUTPATIENT)
Dept: CARE COORDINATION | Age: 65
End: 2025-07-23
Payer: MEDICARE

## 2025-07-27 ENCOUNTER — ANCILLARY PROCEDURE (OUTPATIENT)
Dept: URGENT CARE | Age: 65
End: 2025-07-27
Payer: MEDICARE

## 2025-07-27 ENCOUNTER — OFFICE VISIT (OUTPATIENT)
Dept: URGENT CARE | Age: 65
End: 2025-07-27
Payer: MEDICARE

## 2025-07-27 VITALS
WEIGHT: 169 LBS | HEIGHT: 64 IN | DIASTOLIC BLOOD PRESSURE: 84 MMHG | SYSTOLIC BLOOD PRESSURE: 124 MMHG | HEART RATE: 88 BPM | BODY MASS INDEX: 28.85 KG/M2 | OXYGEN SATURATION: 98 % | TEMPERATURE: 98 F | RESPIRATION RATE: 20 BRPM

## 2025-07-27 DIAGNOSIS — M79.662 PAIN OF LEFT LOWER LEG: ICD-10-CM

## 2025-07-27 DIAGNOSIS — M79.662 PAIN OF LEFT LOWER LEG: Primary | ICD-10-CM

## 2025-07-27 PROCEDURE — 73610 X-RAY EXAM OF ANKLE: CPT | Mod: LEFT SIDE | Performed by: NURSE PRACTITIONER

## 2025-07-27 PROCEDURE — 3079F DIAST BP 80-89 MM HG: CPT | Performed by: NURSE PRACTITIONER

## 2025-07-27 PROCEDURE — 99203 OFFICE O/P NEW LOW 30 MIN: CPT | Performed by: NURSE PRACTITIONER

## 2025-07-27 PROCEDURE — 3008F BODY MASS INDEX DOCD: CPT | Performed by: NURSE PRACTITIONER

## 2025-07-27 PROCEDURE — 73590 X-RAY EXAM OF LOWER LEG: CPT | Mod: LEFT SIDE | Performed by: NURSE PRACTITIONER

## 2025-07-27 PROCEDURE — 3074F SYST BP LT 130 MM HG: CPT | Performed by: NURSE PRACTITIONER

## 2025-07-27 ASSESSMENT — ENCOUNTER SYMPTOMS
ARTHRALGIAS: 1
CONSTITUTIONAL NEGATIVE: 1
HEMATOLOGIC/LYMPHATIC NEGATIVE: 1
ENDOCRINE NEGATIVE: 1
RESPIRATORY NEGATIVE: 1
CARDIOVASCULAR NEGATIVE: 1
PSYCHIATRIC NEGATIVE: 1
ALLERGIC/IMMUNOLOGIC NEGATIVE: 1
NEUROLOGICAL NEGATIVE: 1
GASTROINTESTINAL NEGATIVE: 1
EYES NEGATIVE: 1
JOINT SWELLING: 1

## 2025-07-28 NOTE — PROGRESS NOTES
History of Present Illness   []     Review of Systems   GENERAL: Negative for malaise, significant weight loss, fever  MUSCULOSKELETAL: see HPI  NEURO:  Negative     Medical History[1]     Medication Documentation Review Audit       Reviewed by ZACH Jane (Nurse Practitioner) on 07/27/25 at 1555      Medication Order Taking? Sig Documenting Provider Last Dose Status   alirocumab 150 mg/mL pen injector [id number]  Inject 1 mL (150 mg) under the skin every 14 (fourteen) days. Jef Howell,   Active   alpha lipoic acid 600 mg capsule [id number] No Take 1 capsule by mouth 2 times a day. Leti Ni MD 3/12/2025 Active   buPROPion XL (Wellbutrin XL) 150 mg 24 hr tablet [id number] No Please take 1 tablet by mouth with BREAKFAST every morning.  Do not crush, chew, or split. Stephan Martins MD 3/12/2025 Active   cyanocobalamin (Vitamin B-12) 100 mcg tablet [id number] No Please take 1 tablet by mouth with LUNCH every day.  Thank you. Stephan Martins MD 3/12/2025 Active   dilTIAZem XR (Dilacor XR) 120 mg 24 hr capsule [id number]  Take 1 capsule (120 mg) by mouth once daily. Take 1 capsule once daily. Jef Howell DO  Active   DULoxetine (Cymbalta) 60 mg DR capsule [id number] No Take 1 capsule (60 mg) by mouth once daily. Do not crush or chew. Stephan Martins MD 3/12/2025 Active   famotidine (Pepcid) 20 mg tablet [id number]  Please take 1 tablet by mouth before breakfast time, and again 1 tablet by mouth before suppertime.  Please take twice a day even if you are not going to eat.  Thank you. Stephan Martins MD  Active   folic acid (Folvite) 1 mg tablet [id number] No Please take 1 tablet by mouth with lunch every day.  Thank you. Stephan Martins MD 3/12/2025 Active   gabapentin (Neurontin) 800 mg tablet [id number]  Take 1 tablet (800 mg) by mouth 3 times a day. Jose Armando Velazquez MD  Active   ipratropium-albuteroL (Duo-Neb) 0.5-2.5 mg/3 mL nebulizer solution  [id number] No Inhale 3 mL 3 times a day as needed.   Patient not taking: Reported on 7/7/2025    Historical Provider, MD Past Month Active   ketamine HCl in 0.9 % NaCl (ketamine in 0.9 % sod chloride) 10 mg/mL solution [id number] No Infuse into a venous catheter. Historical Provider, MD Past Month Active   melatonin 5 mg tablet [id number] No Please take 1 tablet by mouth after supper every evening.  Watch out for daytime sleepiness.  No driving if drowsy.  Thank you. Stehpan Martins MD 3/12/2025 Active   naloxone (Narcan) 4 mg/0.1 mL nasal spray [id number] No Administer 1 spray (4 mg) into affected nostril(s) if needed for opioid reversal. May repeat every 2-3 minutes if needed, alternating nostrils, until medical assistance becomes available. Stephan Martins MD Taking Active   ondansetron ODT (Zofran-ODT) 4 mg disintegrating tablet [id number] No Please melt on tongue every 6-8 hours as needed for nausea.  Lots of fluids throughout the day.  Thank you. Stephan Martins MD Taking Active   pantoprazole (ProtoNix) 40 mg EC tablet [id number] No Take 1 tablet (40 mg) by mouth once daily in the morning. Historical Provider, MD 3/12/2025 Active   polyethylene glycol (Glycolax, Miralax) 17 gram packet [id number] No Take 8.5 g by mouth once daily as needed. Historical Provider, MD 3/12/2025 Active   warfarin (Coumadin) 3 mg tablet [id number]  Take 1 tablet (3 mg) by mouth once daily at bedtime. Take as directed per After Visit Summary. 3 mg on Sunday, Monday, Tuesday and Friday. 4.5mg on Wednesday and Sunday. Stephan Martins MD  Active                     Physical Exam  []     Imaging  XR ankle left 3+ views, XR tibia fibula left 2 views  Narrative: Interpreted By:  Brien Faust,   STUDY:  Left ankle and tibia and fibula dated 7/27/2025.      INDICATION:  Signs/Symptoms:Right lower leg injury with generalized bruising      COMPARISON:  None.      ACCESSION NUMBER(S):  [id number]; [id number]       ORDERING CLINICIAN:  CASSY LONGO      TECHNIQUE:  Three views radiographs of the left ankle. AP and lateral radiographs  of the left tibia and fibula.      FINDINGS:  No fracture or dislocation is evident.  The ankle mortise is  congruent and the tibial plafond and talar dome are intact.  There is  posterior and plantar calcaneal enthesophyte formation. No ankle  joint effusion is evident. No soft tissue gas or radiopaque foreign  body is evident.      Impression: No osseous injury is evident.      MACRO:  None      Signed by: Brien Faust 7/27/2025 4:35 PM  Dictation workstation:   [id number]       Assessment   []     Plan  []    Procedures        [1]   Past Medical History:  Diagnosis Date    COVID-19     COVID    Personal history of other venous thrombosis and embolism     History of deep venous thrombosis

## 2025-07-29 ENCOUNTER — APPOINTMENT (OUTPATIENT)
Dept: ORTHOPEDIC SURGERY | Facility: CLINIC | Age: 65
End: 2025-07-29
Payer: MEDICARE

## 2025-07-29 DIAGNOSIS — M54.16 LUMBAR RADICULAR PAIN: Primary | ICD-10-CM

## 2025-07-29 DIAGNOSIS — R05.3 POST-COVID CHRONIC COUGH: ICD-10-CM

## 2025-07-29 DIAGNOSIS — U09.9 POST-COVID CHRONIC COUGH: ICD-10-CM

## 2025-07-29 DIAGNOSIS — R29.818 NEUROGENIC CLAUDICATION: ICD-10-CM

## 2025-07-29 RX ORDER — KETOROLAC TROMETHAMINE 30 MG/ML
30 INJECTION, SOLUTION INTRAMUSCULAR; INTRAVENOUS ONCE
OUTPATIENT
Start: 2025-08-05 | End: 2025-08-05

## 2025-08-04 ENCOUNTER — APPOINTMENT (OUTPATIENT)
Dept: RADIOLOGY | Facility: HOSPITAL | Age: 65
End: 2025-08-04
Payer: MEDICARE

## 2025-08-04 VITALS — BODY MASS INDEX: 29.01 KG/M2 | WEIGHT: 169 LBS

## 2025-08-04 DIAGNOSIS — Z12.31 SCREENING MAMMOGRAM FOR BREAST CANCER: ICD-10-CM

## 2025-08-04 PROCEDURE — 77067 SCR MAMMO BI INCL CAD: CPT | Performed by: RADIOLOGY

## 2025-08-04 PROCEDURE — 77063 BREAST TOMOSYNTHESIS BI: CPT | Performed by: RADIOLOGY

## 2025-08-04 PROCEDURE — 77063 BREAST TOMOSYNTHESIS BI: CPT

## 2025-08-05 ENCOUNTER — INFUSION (OUTPATIENT)
Dept: INFUSION THERAPY | Facility: CLINIC | Age: 65
End: 2025-08-05
Payer: MEDICARE

## 2025-08-05 VITALS
SYSTOLIC BLOOD PRESSURE: 131 MMHG | DIASTOLIC BLOOD PRESSURE: 77 MMHG | TEMPERATURE: 97.7 F | RESPIRATION RATE: 12 BRPM | OXYGEN SATURATION: 97 % | HEART RATE: 73 BPM

## 2025-08-05 DIAGNOSIS — R29.818 NEUROGENIC CLAUDICATION: ICD-10-CM

## 2025-08-05 DIAGNOSIS — U09.9 POST-COVID CHRONIC COUGH: ICD-10-CM

## 2025-08-05 DIAGNOSIS — M54.16 LUMBAR RADICULAR PAIN: ICD-10-CM

## 2025-08-05 DIAGNOSIS — R05.3 POST-COVID CHRONIC COUGH: ICD-10-CM

## 2025-08-05 PROCEDURE — 96375 TX/PRO/DX INJ NEW DRUG ADDON: CPT | Mod: INF

## 2025-08-05 PROCEDURE — 2500000004 HC RX 250 GENERAL PHARMACY W/ HCPCS (ALT 636 FOR OP/ED): Performed by: NURSE PRACTITIONER

## 2025-08-05 PROCEDURE — 96365 THER/PROPH/DIAG IV INF INIT: CPT | Mod: INF

## 2025-08-05 PROCEDURE — 96368 THER/DIAG CONCURRENT INF: CPT | Mod: INF

## 2025-08-05 RX ORDER — FAMOTIDINE 10 MG/ML
20 INJECTION, SOLUTION INTRAVENOUS ONCE AS NEEDED
OUTPATIENT
Start: 2025-09-04

## 2025-08-05 RX ORDER — DIPHENHYDRAMINE HYDROCHLORIDE 50 MG/ML
50 INJECTION, SOLUTION INTRAMUSCULAR; INTRAVENOUS AS NEEDED
OUTPATIENT
Start: 2025-09-04

## 2025-08-05 RX ORDER — KETOROLAC TROMETHAMINE 30 MG/ML
30 INJECTION, SOLUTION INTRAMUSCULAR; INTRAVENOUS ONCE
OUTPATIENT
Start: 2025-09-04 | End: 2025-09-04

## 2025-08-05 RX ORDER — ALBUTEROL SULFATE 0.83 MG/ML
3 SOLUTION RESPIRATORY (INHALATION) AS NEEDED
OUTPATIENT
Start: 2025-09-04

## 2025-08-05 RX ORDER — NITROGLYCERIN 0.4 MG/1
0.4 TABLET SUBLINGUAL ONCE
OUTPATIENT
Start: 2025-09-04 | End: 2025-09-04

## 2025-08-05 RX ORDER — EPINEPHRINE 0.3 MG/.3ML
0.3 INJECTION SUBCUTANEOUS EVERY 5 MIN PRN
OUTPATIENT
Start: 2025-09-04

## 2025-08-05 RX ORDER — ONDANSETRON HYDROCHLORIDE 2 MG/ML
4 INJECTION, SOLUTION INTRAVENOUS ONCE
OUTPATIENT
Start: 2025-09-04 | End: 2025-09-04

## 2025-08-05 RX ORDER — KETOROLAC TROMETHAMINE 30 MG/ML
30 INJECTION, SOLUTION INTRAMUSCULAR; INTRAVENOUS ONCE
Status: COMPLETED | OUTPATIENT
Start: 2025-08-05 | End: 2025-08-05

## 2025-08-05 RX ADMIN — Medication: at 14:53

## 2025-08-05 RX ADMIN — PROPOFOL 100 MG: 10 INJECTION, EMULSION INTRAVENOUS at 14:53

## 2025-08-05 RX ADMIN — KETOROLAC TROMETHAMINE 30 MG: 30 INJECTION, SOLUTION INTRAMUSCULAR at 14:52

## 2025-08-05 ASSESSMENT — ENCOUNTER SYMPTOMS
LOSS OF SENSATION IN FEET: 0
DEPRESSION: 0
OCCASIONAL FEELINGS OF UNSTEADINESS: 0

## 2025-08-05 ASSESSMENT — PAIN SCALES - GENERAL
PAINLEVEL_OUTOF10: 0 - NO PAIN
PAINLEVEL_OUTOF10: 3

## 2025-08-05 ASSESSMENT — PAIN - FUNCTIONAL ASSESSMENT
PAIN_FUNCTIONAL_ASSESSMENT: 0-10
PAIN_FUNCTIONAL_ASSESSMENT: 0-10

## 2025-08-05 ASSESSMENT — PAIN DESCRIPTION - DESCRIPTORS: DESCRIPTORS: PRESSURE

## 2025-08-05 NOTE — PROGRESS NOTES
S: Patient here for 24th opioid sparing pain infusion. Patient reports 65% reduction in pain after last infusion that lasted 3 weeks.    Purpose of pain infusion meds explained along with potential side effects.  Patient verbalized understanding.    B: Pain Issues are 3/10 pain in the right lower leg. Is Patient breast feeding: no    Is Patient receiving any other form Ketamine from any other facility/ outside clinic ?: no    A: Patient currently has pain described on flow sheet documentation. Designated  is patient's  Lonnie @[phone]. Patient last ate solid food 16 hours ago, and had liquid 1 hour  ago.    R: Plan; Obtain IV access, do patient risk assessment, and start opioid sparing infusion as ordered. Monitoring for S/S of adverse reactions.    Post infusion teaching provided. Patient verbalized understanding. VSS, Patient states pain is 0/10. Will assist patient to waiting car via wheelchair.

## 2025-08-05 NOTE — PATIENT INSTRUCTIONS
Today :We administered (ketamine 30 mg, lidocaine (Xylocaine) 300 mg in sodium chloride 0.9% 518 mL IV), propofol (Diprivan) 100 mg in dextrose 5% 25 mL IV, and ketorolac.     For:   1. Lumbar radicular pain    2. Neurogenic claudication    3. Post-COVID chronic cough         Your next appointment is due in:  30 days        Please read the  Medication Guide that was given to you and reviewed during todays visit.     (Tell all doctors including dentists that you are taking this medication)     Go to the emergency room or call 911 if:  -You have signs of allergic reaction:   -Rash, hives, itching.   -Swollen, blistered, peeling skin.   -Swelling of face, lips, mouth, tongue or throat.   -Tightness of chest, trouble breathing, swallowing or talking     Call your doctor:  - If IV / injection site gets red, warm, swollen, itchy or leaks fluid or pus.     (Leave dressing on your IV site for at least 2 hours and keep area clean and dry  - If you get sick or have symptoms of infection or are not feeling well for any reason.    (Wash your hands often, stay away from people who are sick)  - If you have side effects from your medication that do not go away or are bothersome.     (Refer to the teaching your nurse gave you for side effects to call your doctor about)    - Common side effects may include:  stuffy nose, headache, feeling tired, muscle aches, upset stomach  - Before receiving any vaccines     - Call [facility] at   If:  - You get sick, are on antibiotics, have had a recent vaccine, have surgery or dental work and your doctor wants your visit rescheduled.  - You need to cancel and reschedule your visit for any reason. Call at least 2 days before your visit if you need to cancel.   - Your insurance changes before your next visit.    (We will need to get approval from your new insurance. This can take up to two weeks.)     [facility] is opened Monday thru Friday. We are closed  on weekends and holidays.   Voice mail will take your call if the center is closed. If you leave a message please allow 24 hours for a call back during weekdays. If you leave a message on a weekend/holiday, we will call you back the next business day.    A pharmacist is available Monday - Friday from 8:30AM to 3:30PM to help answer any questions you may have about your prescriptions(s). Please call pharmacy at:    [facility]: [phone]  [facility]: [phone]  [facility]: [phone]              [facility]      Pain Infusion Aftercare Instructions      1. It is normal to feel sedated, tired and low in energy after a pain infusion. DO NOT DRIVE, OPERATE ANY MACHINERY, OR MAKE ANY IMPORTANT DECISIONS FOR AT LEAST 24 HOURS AFTER THE INFUSION.     2. Call the pain center at [phone] with any problems, questions, or concerns.     3. Eat light after the infusion. If you feel queasy or sick to your stomach, laying down with your eyes closed may help. When you resume eating start with something mild like clear liquids, yogurt, applesauce, crackers, etc… Gradually advance to a regular diet.     4. Do not leave your house alone the evening of your pain infusion.     5. No alcohol or sedative medications, such as sleeping pills, for 24 hours after your pain infusion.     6. Resume all other prescribed medications unless directed otherwise by you physician.     7. If you have any medical emergencies, call 911 or go directly to the closest emergency room.    Patient Instructions  [facility] Infusion   [facility]      1. A responsible adult must stay with you during your infusion and drive you home. If you do not have a responsible adult with transportation home, your appointment will be rescheduled. Patients must still have a responsible adult if they use Rideshare, Uber, or Lyft. Uber, Rideshare, or Lyft drivers do not qualify.   2. On the day of  your infusion we ask that you please drink clear liquids until your appointment.  You should NOT eat food 4 hours before your infusion.    3. Take all medications as prescribed before your infusion. Do not withhold blood pressure medication.   4. Patients who use a CPAP or BIPAP should bring it to the infusion appointment.   5. Children under 16 will not be permitted in the infusion clinic. Please do not bring young children or pets. For patients who must bring a service animal, paperwork will be required. NO EXCEPTIONS.  6.  All Women will be required to take a pregnancy test prior to the infusion unless they are above 55 years of age or have had a hysterectomy.   7. Patients who cancel their infusion should call the Infusion line at [phone] as soon as possible. We would appreciate a 48-hour notice. Please be on time for the appt. Patients who are more than 15 mins late will have to cancel and reschedule. Infusion appt times are minimal. Patients who do not show, cancel, or reschedule an appointment may have a long wait until we can get them back on the schedule.   8. We make every effort to schedule your infusions based on your physician's recommendations. However, factors will affect our infusion schedule and availability. We appreciate your understanding.  9. Appointments will only be scheduled for two appointments in the future.   10. No eating, drinking, or chewing gum during the infusion.   11. If you miss or cancel your infusion appointment it is YOUR responsibility to call us and reschedule.  Please call [phone] or [phone] to reschedule or cancel appointment

## 2025-08-11 ENCOUNTER — ANTICOAGULATION - WARFARIN VISIT (OUTPATIENT)
Dept: CARDIOLOGY | Facility: CLINIC | Age: 65
End: 2025-08-11
Payer: MEDICARE

## 2025-08-11 DIAGNOSIS — I74.9 ARTERIAL EMBOLISM (MULTI): Primary | ICD-10-CM

## 2025-08-11 DIAGNOSIS — Z86.718 HISTORY OF DEEP VEIN THROMBOSIS: ICD-10-CM

## 2025-08-11 DIAGNOSIS — I73.9 PAD (PERIPHERAL ARTERY DISEASE): ICD-10-CM

## 2025-08-11 LAB
POC INR: 3.6 (ref 0.9–1.1)
POC PROTHROMBIN TIME: ABNORMAL (ref 9.3–12.5)

## 2025-08-11 PROCEDURE — 85610 PROTHROMBIN TIME: CPT | Mod: QW | Performed by: INTERNAL MEDICINE

## 2025-08-11 PROCEDURE — 99211 OFF/OP EST MAY X REQ PHY/QHP: CPT

## 2025-08-18 ENCOUNTER — HOSPITAL ENCOUNTER (OUTPATIENT)
Dept: RADIOLOGY | Facility: HOSPITAL | Age: 65
Discharge: HOME | End: 2025-08-18
Payer: MEDICARE

## 2025-08-18 ENCOUNTER — OFFICE VISIT (OUTPATIENT)
Dept: ORTHOPEDIC SURGERY | Facility: CLINIC | Age: 65
End: 2025-08-18
Payer: MEDICARE

## 2025-08-18 DIAGNOSIS — M54.50 ACUTE LOW BACK PAIN, UNSPECIFIED BACK PAIN LATERALITY, UNSPECIFIED WHETHER SCIATICA PRESENT: ICD-10-CM

## 2025-08-18 DIAGNOSIS — I82.411 ACUTE EMBOLISM AND THROMBOSIS OF RIGHT FEMORAL VEIN (MULTI): ICD-10-CM

## 2025-08-18 DIAGNOSIS — S39.012A LUMBAR STRAIN, INITIAL ENCOUNTER: ICD-10-CM

## 2025-08-18 DIAGNOSIS — G57.01 PIRIFORMIS SYNDROME OF RIGHT SIDE: ICD-10-CM

## 2025-08-18 PROCEDURE — 72110 X-RAY EXAM L-2 SPINE 4/>VWS: CPT

## 2025-08-18 PROCEDURE — 99214 OFFICE O/P EST MOD 30 MIN: CPT | Performed by: STUDENT IN AN ORGANIZED HEALTH CARE EDUCATION/TRAINING PROGRAM

## 2025-08-18 PROCEDURE — 72110 X-RAY EXAM L-2 SPINE 4/>VWS: CPT | Performed by: RADIOLOGY

## 2025-08-18 RX ORDER — TIZANIDINE 4 MG/1
4 TABLET ORAL NIGHTLY PRN
Qty: 7 TABLET | Refills: 0 | Status: SHIPPED | OUTPATIENT
Start: 2025-08-18 | End: 2025-08-25

## 2025-08-18 RX ORDER — PREDNISONE 20 MG/1
20 TABLET ORAL 2 TIMES DAILY
Qty: 10 TABLET | Refills: 0 | Status: SHIPPED | OUTPATIENT
Start: 2025-08-18 | End: 2025-08-23

## 2025-08-18 RX ORDER — WARFARIN 3 MG/1
3 TABLET ORAL NIGHTLY
Qty: 103 TABLET | Refills: 1 | Status: SHIPPED | OUTPATIENT
Start: 2025-08-18

## 2025-08-20 ENCOUNTER — ANTICOAGULATION - WARFARIN VISIT (OUTPATIENT)
Dept: CARDIOLOGY | Facility: CLINIC | Age: 65
End: 2025-08-20
Payer: MEDICARE

## 2025-08-20 DIAGNOSIS — I74.9 ARTERIAL EMBOLISM (MULTI): Primary | ICD-10-CM

## 2025-08-20 DIAGNOSIS — I73.9 PAD (PERIPHERAL ARTERY DISEASE): ICD-10-CM

## 2025-08-20 DIAGNOSIS — Z86.718 HISTORY OF DEEP VEIN THROMBOSIS: ICD-10-CM

## 2025-08-20 LAB
POC INR: 3.3 (ref 0.9–1.1)
POC PROTHROMBIN TIME: ABNORMAL (ref 9.3–12.5)

## 2025-08-20 PROCEDURE — 99211 OFF/OP EST MAY X REQ PHY/QHP: CPT

## 2025-08-20 PROCEDURE — 85610 PROTHROMBIN TIME: CPT | Mod: QW | Performed by: INTERNAL MEDICINE

## 2025-08-26 ENCOUNTER — ANTICOAGULATION - WARFARIN VISIT (OUTPATIENT)
Dept: CARDIOLOGY | Facility: CLINIC | Age: 65
End: 2025-08-26
Payer: MEDICARE

## 2025-08-26 ENCOUNTER — TELEPHONE (OUTPATIENT)
Dept: GASTROENTEROLOGY | Facility: CLINIC | Age: 65
End: 2025-08-26

## 2025-08-26 DIAGNOSIS — Z86.718 HISTORY OF DEEP VEIN THROMBOSIS: ICD-10-CM

## 2025-08-26 DIAGNOSIS — I73.9 PAD (PERIPHERAL ARTERY DISEASE): ICD-10-CM

## 2025-08-26 DIAGNOSIS — I74.9 ARTERIAL EMBOLISM (MULTI): Primary | ICD-10-CM

## 2025-08-26 LAB
POC INR: 3.3 (ref 0.9–1.1)
POC PROTHROMBIN TIME: ABNORMAL (ref 9.3–12.5)

## 2025-08-26 PROCEDURE — 85610 PROTHROMBIN TIME: CPT | Mod: QW | Performed by: INTERNAL MEDICINE

## 2025-08-26 PROCEDURE — 99211 OFF/OP EST MAY X REQ PHY/QHP: CPT

## 2025-08-27 ENCOUNTER — APPOINTMENT (OUTPATIENT)
Dept: GASTROENTEROLOGY | Facility: CLINIC | Age: 65
End: 2025-08-27
Payer: MEDICARE

## 2025-08-27 VITALS
WEIGHT: 180 LBS | OXYGEN SATURATION: 96 % | DIASTOLIC BLOOD PRESSURE: 85 MMHG | SYSTOLIC BLOOD PRESSURE: 124 MMHG | BODY MASS INDEX: 30.73 KG/M2 | HEART RATE: 80 BPM | HEIGHT: 64 IN

## 2025-08-27 DIAGNOSIS — R15.2 FECAL URGENCY: Primary | ICD-10-CM

## 2025-08-27 DIAGNOSIS — R15.9 INCONTINENCE OF FECES WITH FECAL URGENCY: ICD-10-CM

## 2025-08-27 DIAGNOSIS — K62.5 BRBPR (BRIGHT RED BLOOD PER RECTUM): ICD-10-CM

## 2025-08-27 DIAGNOSIS — R15.2 INCONTINENCE OF FECES WITH FECAL URGENCY: ICD-10-CM

## 2025-08-27 PROCEDURE — 1036F TOBACCO NON-USER: CPT | Performed by: STUDENT IN AN ORGANIZED HEALTH CARE EDUCATION/TRAINING PROGRAM

## 2025-08-27 PROCEDURE — 3074F SYST BP LT 130 MM HG: CPT | Performed by: STUDENT IN AN ORGANIZED HEALTH CARE EDUCATION/TRAINING PROGRAM

## 2025-08-27 PROCEDURE — 3049F LDL-C 100-129 MG/DL: CPT | Performed by: STUDENT IN AN ORGANIZED HEALTH CARE EDUCATION/TRAINING PROGRAM

## 2025-08-27 PROCEDURE — 99205 OFFICE O/P NEW HI 60 MIN: CPT | Performed by: STUDENT IN AN ORGANIZED HEALTH CARE EDUCATION/TRAINING PROGRAM

## 2025-08-27 PROCEDURE — 3079F DIAST BP 80-89 MM HG: CPT | Performed by: STUDENT IN AN ORGANIZED HEALTH CARE EDUCATION/TRAINING PROGRAM

## 2025-08-27 PROCEDURE — 3008F BODY MASS INDEX DOCD: CPT | Performed by: STUDENT IN AN ORGANIZED HEALTH CARE EDUCATION/TRAINING PROGRAM

## 2025-08-27 RX ORDER — POLYETHYLENE GLYCOL 3350, SODIUM SULFATE ANHYDROUS, SODIUM BICARBONATE, SODIUM CHLORIDE, POTASSIUM CHLORIDE 236; 22.74; 6.74; 5.86; 2.97 G/4L; G/4L; G/4L; G/4L; G/4L
4000 POWDER, FOR SOLUTION ORAL ONCE
Qty: 4000 ML | Refills: 0 | Status: SHIPPED | OUTPATIENT
Start: 2025-08-27 | End: 2025-08-30

## 2025-08-28 RX ORDER — KETOROLAC TROMETHAMINE 30 MG/ML
30 INJECTION, SOLUTION INTRAMUSCULAR; INTRAVENOUS ONCE
OUTPATIENT
Start: 2025-09-03 | End: 2025-09-03

## 2025-08-29 RX ORDER — ASPIRIN 81 MG/1
81 TABLET ORAL DAILY
COMMUNITY

## 2025-09-02 ENCOUNTER — ANTICOAGULATION - WARFARIN VISIT (OUTPATIENT)
Dept: CARDIOLOGY | Facility: CLINIC | Age: 65
End: 2025-09-02
Payer: MEDICARE

## 2025-09-02 DIAGNOSIS — I74.9 ARTERIAL EMBOLISM (MULTI): Primary | ICD-10-CM

## 2025-09-02 DIAGNOSIS — I73.9 PAD (PERIPHERAL ARTERY DISEASE): ICD-10-CM

## 2025-09-02 DIAGNOSIS — Z86.718 HISTORY OF DEEP VEIN THROMBOSIS: ICD-10-CM

## 2025-09-02 LAB
POC INR: 2 (ref 0.9–1.1)
POC PROTHROMBIN TIME: ABNORMAL (ref 9.3–12.5)

## 2025-09-02 PROCEDURE — 85610 PROTHROMBIN TIME: CPT | Mod: QW | Performed by: INTERNAL MEDICINE

## 2025-09-02 PROCEDURE — 99211 OFF/OP EST MAY X REQ PHY/QHP: CPT

## 2025-09-03 ENCOUNTER — INFUSION (OUTPATIENT)
Dept: INFUSION THERAPY | Facility: CLINIC | Age: 65
End: 2025-09-03
Payer: MEDICARE

## 2025-09-03 VITALS
HEART RATE: 79 BPM | TEMPERATURE: 97.5 F | OXYGEN SATURATION: 98 % | DIASTOLIC BLOOD PRESSURE: 67 MMHG | RESPIRATION RATE: 15 BRPM | SYSTOLIC BLOOD PRESSURE: 152 MMHG

## 2025-09-03 DIAGNOSIS — M54.16 LUMBAR RADICULAR PAIN: ICD-10-CM

## 2025-09-03 DIAGNOSIS — R29.818 NEUROGENIC CLAUDICATION: ICD-10-CM

## 2025-09-03 DIAGNOSIS — R05.3 POST-COVID CHRONIC COUGH: ICD-10-CM

## 2025-09-03 DIAGNOSIS — U09.9 POST-COVID CHRONIC COUGH: ICD-10-CM

## 2025-09-03 PROCEDURE — 2500000004 HC RX 250 GENERAL PHARMACY W/ HCPCS (ALT 636 FOR OP/ED): Mod: JZ | Performed by: NURSE PRACTITIONER

## 2025-09-03 PROCEDURE — 96375 TX/PRO/DX INJ NEW DRUG ADDON: CPT | Mod: INF

## 2025-09-03 PROCEDURE — 96368 THER/DIAG CONCURRENT INF: CPT | Mod: INF

## 2025-09-03 PROCEDURE — 96365 THER/PROPH/DIAG IV INF INIT: CPT | Mod: INF

## 2025-09-03 RX ORDER — DIPHENHYDRAMINE HYDROCHLORIDE 50 MG/ML
50 INJECTION, SOLUTION INTRAMUSCULAR; INTRAVENOUS AS NEEDED
OUTPATIENT
Start: 2025-10-03

## 2025-09-03 RX ORDER — NITROGLYCERIN 0.4 MG/1
0.4 TABLET SUBLINGUAL ONCE
OUTPATIENT
Start: 2025-10-03 | End: 2025-10-03

## 2025-09-03 RX ORDER — KETOROLAC TROMETHAMINE 30 MG/ML
30 INJECTION, SOLUTION INTRAMUSCULAR; INTRAVENOUS ONCE
Status: COMPLETED | OUTPATIENT
Start: 2025-09-03 | End: 2025-09-03

## 2025-09-03 RX ORDER — FAMOTIDINE 10 MG/ML
20 INJECTION, SOLUTION INTRAVENOUS ONCE AS NEEDED
OUTPATIENT
Start: 2025-10-03

## 2025-09-03 RX ORDER — KETOROLAC TROMETHAMINE 30 MG/ML
30 INJECTION, SOLUTION INTRAMUSCULAR; INTRAVENOUS ONCE
OUTPATIENT
Start: 2025-10-03 | End: 2025-10-03

## 2025-09-03 RX ORDER — ONDANSETRON HYDROCHLORIDE 2 MG/ML
4 INJECTION, SOLUTION INTRAVENOUS ONCE
OUTPATIENT
Start: 2025-10-03 | End: 2025-10-03

## 2025-09-03 RX ORDER — ALBUTEROL SULFATE 0.83 MG/ML
3 SOLUTION RESPIRATORY (INHALATION) AS NEEDED
OUTPATIENT
Start: 2025-10-03

## 2025-09-03 RX ORDER — EPINEPHRINE 0.3 MG/.3ML
0.3 INJECTION SUBCUTANEOUS EVERY 5 MIN PRN
OUTPATIENT
Start: 2025-10-03

## 2025-09-03 RX ADMIN — PROPOFOL 100 MG: 10 INJECTION, EMULSION INTRAVENOUS at 13:52

## 2025-09-03 RX ADMIN — Medication: at 13:52

## 2025-09-03 RX ADMIN — KETOROLAC TROMETHAMINE 30 MG: 30 INJECTION, SOLUTION INTRAMUSCULAR at 13:51

## 2025-09-03 ASSESSMENT — PAIN SCALES - GENERAL
PAINLEVEL_OUTOF10: 4
PAINLEVEL_OUTOF10: 4
PAINLEVEL_OUTOF10: 0 - NO PAIN

## 2025-09-03 ASSESSMENT — ENCOUNTER SYMPTOMS
LOSS OF SENSATION IN FEET: 0
DEPRESSION: 1
OCCASIONAL FEELINGS OF UNSTEADINESS: 1

## 2025-09-10 ENCOUNTER — APPOINTMENT (OUTPATIENT)
Dept: CARDIOLOGY | Facility: CLINIC | Age: 65
End: 2025-09-10
Payer: MEDICARE

## 2025-09-16 ENCOUNTER — APPOINTMENT (OUTPATIENT)
Dept: PRIMARY CARE | Facility: CLINIC | Age: 65
End: 2025-09-16
Payer: MEDICARE

## 2025-10-01 ENCOUNTER — APPOINTMENT (OUTPATIENT)
Dept: PRIMARY CARE | Facility: CLINIC | Age: 65
End: 2025-10-01
Payer: MEDICARE

## 2025-10-07 ENCOUNTER — APPOINTMENT (OUTPATIENT)
Dept: ORTHOPEDIC SURGERY | Facility: CLINIC | Age: 65
End: 2025-10-07
Payer: MEDICARE

## (undated) DEVICE — CATHETER, 5 FR. 100CM, ANGLE TAPER AT TIP

## (undated) DEVICE — CATHETER, SOFT VU, NON-BRAID FLUSH, .035, 5FR, 65CM, 6-SIDEHOLE

## (undated) DEVICE — INFLATION KIT, ADVANTAGE, ENCORE 26 (1/BOX)

## (undated) DEVICE — Device

## (undated) DEVICE — CONTROL WIRE, .018 X 300

## (undated) DEVICE — MICROINTRODUCER KIT, VSI, 4FR X 40CM, STIFFEN

## (undated) DEVICE — SHEATH, PINNACLE, W/.038 GUIDEWIRE, 10 CM,  6FR INTRODUCER, 6FR DIA, 2.5 CM DIALATOR

## (undated) DEVICE — SHEATH, PINNACLE, W/.038 GW 10CM, 5FR INTRODUCER, 2.5 CM DIALATOR

## (undated) DEVICE — PACK, ANGIO P2, CUSTOM, LAKE

## (undated) DEVICE — GUIDEWIRE, STIFF SHAFT, ANGLE TIP, .035 DIA, 260 CM,  5 CM TIP"

## (undated) DEVICE — SHEATH, 65CM, 6FR, ST CURVE STYLE, W/CROSS-CUT VALVE

## (undated) DEVICE — CLOSURE SYSTEM, VASCULAR, VASCADE 6/7F VCS